# Patient Record
Sex: MALE | Race: WHITE | Employment: OTHER | URBAN - NONMETROPOLITAN AREA
[De-identification: names, ages, dates, MRNs, and addresses within clinical notes are randomized per-mention and may not be internally consistent; named-entity substitution may affect disease eponyms.]

---

## 2018-03-07 ENCOUNTER — APPOINTMENT (OUTPATIENT)
Dept: GENERAL RADIOLOGY | Age: 63
DRG: 720 | End: 2018-03-07
Payer: COMMERCIAL

## 2018-03-07 ENCOUNTER — HOSPITAL ENCOUNTER (INPATIENT)
Age: 63
LOS: 7 days | Discharge: HOME HEALTH | DRG: 720 | End: 2018-03-14
Attending: INTERNAL MEDICINE | Admitting: INTERNAL MEDICINE
Payer: COMMERCIAL

## 2018-03-07 ENCOUNTER — APPOINTMENT (OUTPATIENT)
Dept: CT IMAGING | Age: 63
DRG: 720 | End: 2018-03-07
Payer: COMMERCIAL

## 2018-03-07 DIAGNOSIS — J18.9 COMMUNITY ACQUIRED PNEUMONIA, UNSPECIFIED LATERALITY: ICD-10-CM

## 2018-03-07 DIAGNOSIS — A41.9 SEPSIS, DUE TO UNSPECIFIED ORGANISM: Primary | ICD-10-CM

## 2018-03-07 DIAGNOSIS — I10 ESSENTIAL HYPERTENSION: ICD-10-CM

## 2018-03-07 DIAGNOSIS — G47.30 SLEEP APNEA, UNSPECIFIED TYPE: ICD-10-CM

## 2018-03-07 DIAGNOSIS — E10.10 DIABETIC KETOACIDOSIS WITHOUT COMA ASSOCIATED WITH TYPE 1 DIABETES MELLITUS (HCC): ICD-10-CM

## 2018-03-07 PROBLEM — E11.10 DIABETIC KETOACIDOSIS WITHOUT COMA ASSOCIATED WITH TYPE 2 DIABETES MELLITUS (HCC): Status: ACTIVE | Noted: 2018-03-07

## 2018-03-07 LAB
ALBUMIN SERPL-MCNC: 3.7 G/DL (ref 3.5–5.1)
ALLEN TEST: POSITIVE
ALP BLD-CCNC: 94 U/L (ref 38–126)
ALT SERPL-CCNC: 10 U/L (ref 11–66)
AMYLASE: 28 U/L (ref 20–104)
ANION GAP SERPL CALCULATED.3IONS-SCNC: 32 MEQ/L (ref 8–16)
ANISOCYTOSIS: ABNORMAL
AST SERPL-CCNC: 7 U/L (ref 5–40)
BASE EXCESS (CALCULATED): -20.2 MMOL/L (ref -2.5–2.5)
BASE EXCESS (CALCULATED): -22.4 MMOL/L (ref -2.5–2.5)
BASOPHILS # BLD: 0.2 %
BASOPHILS ABSOLUTE: 0 THOU/MM3 (ref 0–0.1)
BETA-HYDROXYBUTYRATE: 76.81 MG/DL (ref 0.2–2.81)
BILIRUB SERPL-MCNC: 0.3 MG/DL (ref 0.3–1.2)
BILIRUBIN DIRECT: < 0.2 MG/DL (ref 0–0.3)
BUN BLDV-MCNC: 34 MG/DL (ref 7–22)
CALCIUM SERPL-MCNC: 9 MG/DL (ref 8.5–10.5)
CHLORIDE BLD-SCNC: 90 MEQ/L (ref 98–111)
CO2: 6 MEQ/L (ref 23–33)
COLLECTED BY:: ABNORMAL
CREAT SERPL-MCNC: 1.4 MG/DL (ref 0.4–1.2)
DEVICE: ABNORMAL
EKG ATRIAL RATE: 122 BPM
EKG P AXIS: -6 DEGREES
EKG P-R INTERVAL: 156 MS
EKG Q-T INTERVAL: 320 MS
EKG QRS DURATION: 84 MS
EKG QTC CALCULATION (BAZETT): 456 MS
EKG R AXIS: -70 DEGREES
EKG T AXIS: 80 DEGREES
EKG VENTRICULAR RATE: 122 BPM
EOSINOPHIL # BLD: 0 %
EOSINOPHILS ABSOLUTE: 0 THOU/MM3 (ref 0–0.4)
FLU A ANTIGEN: NEGATIVE
FLU B ANTIGEN: NEGATIVE
GFR SERPL CREATININE-BSD FRML MDRD: 51 ML/MIN/1.73M2
GLUCOSE BLD-MCNC: 185 MG/DL (ref 70–108)
GLUCOSE BLD-MCNC: 268 MG/DL (ref 70–108)
GLUCOSE BLD-MCNC: 338 MG/DL (ref 70–108)
GLUCOSE BLD-MCNC: 416 MG/DL (ref 70–108)
GLUCOSE BLD-MCNC: 460 MG/DL (ref 70–108)
GLUCOSE BLD-MCNC: 557 MG/DL (ref 70–108)
GLUCOSE BLD-MCNC: 595 MG/DL (ref 70–108)
HCO3: 5 MMOL/L (ref 23–28)
HCO3: 7 MMOL/L (ref 23–28)
HCT VFR BLD CALC: 51 % (ref 42–52)
HEMOGLOBIN: 16.5 GM/DL (ref 14–18)
IFIO2: 4
IFIO2: 50
LACTIC ACID, SEPSIS: 2 MMOL/L (ref 0.5–1.9)
LACTIC ACID, SEPSIS: 2.4 MMOL/L (ref 0.5–1.9)
LIPASE: 46.8 U/L (ref 5.6–51.3)
LYMPHOCYTES # BLD: 6.1 %
LYMPHOCYTES ABSOLUTE: 1.1 THOU/MM3 (ref 1–4.8)
MAGNESIUM: 2.5 MG/DL (ref 1.6–2.4)
MCH RBC QN AUTO: 28.8 PG (ref 27–31)
MCHC RBC AUTO-ENTMCNC: 32.5 GM/DL (ref 33–37)
MCV RBC AUTO: 88.5 FL (ref 80–94)
MODE: ABNORMAL
MONOCYTES # BLD: 5.4 %
MONOCYTES ABSOLUTE: 1 THOU/MM3 (ref 0.4–1.3)
NUCLEATED RED BLOOD CELLS: 0 /100 WBC
O2 SATURATION: 96 %
O2 SATURATION: 99 %
OSMOLALITY CALCULATION: 292.3 MOSMOL/KG (ref 275–300)
PCO2: 11 MMHG (ref 35–45)
PCO2: 14 MMHG (ref 35–45)
PCO2: 26 MMHG (ref 35–45)
PDW BLD-RTO: 15.1 % (ref 11.5–14.5)
PH BLOOD GAS: 7.04 (ref 7.35–7.45)
PH BLOOD GAS: 7.18 (ref 7.35–7.45)
PH BLOOD GAS: 7.19 (ref 7.35–7.45)
PLATELET # BLD: 304 THOU/MM3 (ref 130–400)
PMV BLD AUTO: 11.2 FL (ref 7.4–10.4)
PO2: 121 MMHG (ref 71–104)
PO2: 134 MMHG (ref 71–104)
PO2: 142 MMHG (ref 71–104)
POTASSIUM SERPL-SCNC: 6.1 MEQ/L (ref 3.5–5.2)
PRO-BNP: 494.2 PG/ML (ref 0–900)
RBC # BLD: 5.76 MILL/MM3 (ref 4.7–6.1)
SEG NEUTROPHILS: 88.3 %
SEGMENTED NEUTROPHILS ABSOLUTE COUNT: 16.2 THOU/MM3 (ref 1.8–7.7)
SET PEEP: 5 MMHG
SET RESPIRATORY RATE: 22 BPM
SET TIDAL VOLUME: 440 ML
SODIUM BLD-SCNC: 128 MEQ/L (ref 135–145)
SOURCE, BLOOD GAS: ABNORMAL
TOTAL PROTEIN: 6.7 G/DL (ref 6.1–8)
TROPONIN T: < 0.01 NG/ML
WBC # BLD: 18.3 THOU/MM3 (ref 4.8–10.8)

## 2018-03-07 PROCEDURE — 31500 INSERT EMERGENCY AIRWAY: CPT

## 2018-03-07 PROCEDURE — 71045 X-RAY EXAM CHEST 1 VIEW: CPT

## 2018-03-07 PROCEDURE — 94002 VENT MGMT INPAT INIT DAY: CPT

## 2018-03-07 PROCEDURE — 83036 HEMOGLOBIN GLYCOSYLATED A1C: CPT

## 2018-03-07 PROCEDURE — 6370000000 HC RX 637 (ALT 250 FOR IP): Performed by: INTERNAL MEDICINE

## 2018-03-07 PROCEDURE — 83605 ASSAY OF LACTIC ACID: CPT

## 2018-03-07 PROCEDURE — 5A1935Z RESPIRATORY VENTILATION, LESS THAN 24 CONSECUTIVE HOURS: ICD-10-PCS | Performed by: FAMILY MEDICINE

## 2018-03-07 PROCEDURE — 96376 TX/PRO/DX INJ SAME DRUG ADON: CPT

## 2018-03-07 PROCEDURE — 96368 THER/DIAG CONCURRENT INF: CPT

## 2018-03-07 PROCEDURE — 96365 THER/PROPH/DIAG IV INF INIT: CPT

## 2018-03-07 PROCEDURE — 87804 INFLUENZA ASSAY W/OPTIC: CPT

## 2018-03-07 PROCEDURE — 93005 ELECTROCARDIOGRAM TRACING: CPT | Performed by: INTERNAL MEDICINE

## 2018-03-07 PROCEDURE — 74176 CT ABD & PELVIS W/O CONTRAST: CPT

## 2018-03-07 PROCEDURE — 85025 COMPLETE CBC W/AUTO DIFF WBC: CPT

## 2018-03-07 PROCEDURE — 6360000002 HC RX W HCPCS: Performed by: INTERNAL MEDICINE

## 2018-03-07 PROCEDURE — 80053 COMPREHEN METABOLIC PANEL: CPT

## 2018-03-07 PROCEDURE — 82550 ASSAY OF CK (CPK): CPT

## 2018-03-07 PROCEDURE — 2700000000 HC OXYGEN THERAPY PER DAY

## 2018-03-07 PROCEDURE — 96375 TX/PRO/DX INJ NEW DRUG ADDON: CPT

## 2018-03-07 PROCEDURE — 94640 AIRWAY INHALATION TREATMENT: CPT

## 2018-03-07 PROCEDURE — 06HM33Z INSERTION OF INFUSION DEVICE INTO RIGHT FEMORAL VEIN, PERCUTANEOUS APPROACH: ICD-10-PCS | Performed by: FAMILY MEDICINE

## 2018-03-07 PROCEDURE — 2580000003 HC RX 258: Performed by: INTERNAL MEDICINE

## 2018-03-07 PROCEDURE — 74150 CT ABDOMEN W/O CONTRAST: CPT

## 2018-03-07 PROCEDURE — 99291 CRITICAL CARE FIRST HOUR: CPT

## 2018-03-07 PROCEDURE — 82948 REAGENT STRIP/BLOOD GLUCOSE: CPT

## 2018-03-07 PROCEDURE — 82248 BILIRUBIN DIRECT: CPT

## 2018-03-07 PROCEDURE — 87040 BLOOD CULTURE FOR BACTERIA: CPT

## 2018-03-07 PROCEDURE — 2500000003 HC RX 250 WO HCPCS: Performed by: INTERNAL MEDICINE

## 2018-03-07 PROCEDURE — 83880 ASSAY OF NATRIURETIC PEPTIDE: CPT

## 2018-03-07 PROCEDURE — 84484 ASSAY OF TROPONIN QUANT: CPT

## 2018-03-07 PROCEDURE — 82010 KETONE BODYS QUAN: CPT

## 2018-03-07 PROCEDURE — 71250 CT THORAX DX C-: CPT

## 2018-03-07 PROCEDURE — 96366 THER/PROPH/DIAG IV INF ADDON: CPT

## 2018-03-07 PROCEDURE — 2500000003 HC RX 250 WO HCPCS

## 2018-03-07 PROCEDURE — 36415 COLL VENOUS BLD VENIPUNCTURE: CPT

## 2018-03-07 PROCEDURE — 82803 BLOOD GASES ANY COMBINATION: CPT

## 2018-03-07 PROCEDURE — 99291 CRITICAL CARE FIRST HOUR: CPT | Performed by: INTERNAL MEDICINE

## 2018-03-07 PROCEDURE — 83690 ASSAY OF LIPASE: CPT

## 2018-03-07 PROCEDURE — 82150 ASSAY OF AMYLASE: CPT

## 2018-03-07 PROCEDURE — C1751 CATH, INF, PER/CENT/MIDLINE: HCPCS

## 2018-03-07 PROCEDURE — 83735 ASSAY OF MAGNESIUM: CPT

## 2018-03-07 PROCEDURE — 2100000000 HC CCU R&B

## 2018-03-07 PROCEDURE — 93010 ELECTROCARDIOGRAM REPORT: CPT | Performed by: INTERNAL MEDICINE

## 2018-03-07 PROCEDURE — 96361 HYDRATE IV INFUSION ADD-ON: CPT

## 2018-03-07 PROCEDURE — 70450 CT HEAD/BRAIN W/O DYE: CPT

## 2018-03-07 PROCEDURE — 0BH17EZ INSERTION OF ENDOTRACHEAL AIRWAY INTO TRACHEA, VIA NATURAL OR ARTIFICIAL OPENING: ICD-10-PCS | Performed by: FAMILY MEDICINE

## 2018-03-07 PROCEDURE — 36600 WITHDRAWAL OF ARTERIAL BLOOD: CPT

## 2018-03-07 RX ORDER — LORAZEPAM 2 MG/ML
1 INJECTION INTRAMUSCULAR ONCE
Status: COMPLETED | OUTPATIENT
Start: 2018-03-07 | End: 2018-03-07

## 2018-03-07 RX ORDER — ETOMIDATE 2 MG/ML
INJECTION INTRAVENOUS DAILY PRN
Status: DISCONTINUED | OUTPATIENT
Start: 2018-03-07 | End: 2018-03-09

## 2018-03-07 RX ORDER — 0.9 % SODIUM CHLORIDE 0.9 %
500 INTRAVENOUS SOLUTION INTRAVENOUS ONCE
Status: DISCONTINUED | OUTPATIENT
Start: 2018-03-07 | End: 2018-03-07

## 2018-03-07 RX ORDER — FENTANYL CITRATE 50 UG/ML
INJECTION, SOLUTION INTRAMUSCULAR; INTRAVENOUS
Status: DISPENSED
Start: 2018-03-07 | End: 2018-03-08

## 2018-03-07 RX ORDER — ROCURONIUM BROMIDE 10 MG/ML
INJECTION, SOLUTION INTRAVENOUS
Status: DISCONTINUED
Start: 2018-03-07 | End: 2018-03-07

## 2018-03-07 RX ORDER — 0.9 % SODIUM CHLORIDE 0.9 %
1000 INTRAVENOUS SOLUTION INTRAVENOUS ONCE
Status: CANCELLED | OUTPATIENT
Start: 2018-03-07 | End: 2018-03-07

## 2018-03-07 RX ORDER — ROCURONIUM BROMIDE 10 MG/ML
INJECTION, SOLUTION INTRAVENOUS DAILY PRN
Status: DISCONTINUED | OUTPATIENT
Start: 2018-03-07 | End: 2018-03-14 | Stop reason: HOSPADM

## 2018-03-07 RX ORDER — 0.9 % SODIUM CHLORIDE 0.9 %
1000 INTRAVENOUS SOLUTION INTRAVENOUS ONCE
Status: COMPLETED | OUTPATIENT
Start: 2018-03-07 | End: 2018-03-07

## 2018-03-07 RX ORDER — FENTANYL CITRATE 50 UG/ML
50 INJECTION, SOLUTION INTRAMUSCULAR; INTRAVENOUS
Status: ACTIVE | OUTPATIENT
Start: 2018-03-07 | End: 2018-03-10

## 2018-03-07 RX ORDER — DEXTROSE AND SODIUM CHLORIDE 5; .45 G/100ML; G/100ML
INJECTION, SOLUTION INTRAVENOUS CONTINUOUS PRN
Status: DISCONTINUED | OUTPATIENT
Start: 2018-03-07 | End: 2018-03-08

## 2018-03-07 RX ORDER — SODIUM CHLORIDE 450 MG/100ML
INJECTION, SOLUTION INTRAVENOUS CONTINUOUS
Status: DISCONTINUED | OUTPATIENT
Start: 2018-03-08 | End: 2018-03-09

## 2018-03-07 RX ORDER — SODIUM CHLORIDE 450 MG/100ML
INJECTION, SOLUTION INTRAVENOUS CONTINUOUS
Status: DISCONTINUED | OUTPATIENT
Start: 2018-03-07 | End: 2018-03-08 | Stop reason: SDUPTHER

## 2018-03-07 RX ORDER — MIRTAZAPINE 30 MG/1
30 TABLET, FILM COATED ORAL NIGHTLY
Status: DISCONTINUED | OUTPATIENT
Start: 2018-03-08 | End: 2018-03-13 | Stop reason: CLARIF

## 2018-03-07 RX ORDER — PROPOFOL 10 MG/ML
20 INJECTION, EMULSION INTRAVENOUS
Status: DISCONTINUED | OUTPATIENT
Start: 2018-03-07 | End: 2018-03-09

## 2018-03-07 RX ORDER — 0.9 % SODIUM CHLORIDE 0.9 %
1000 INTRAVENOUS SOLUTION INTRAVENOUS ONCE
Status: DISCONTINUED | OUTPATIENT
Start: 2018-03-07 | End: 2018-03-07

## 2018-03-07 RX ORDER — 0.9 % SODIUM CHLORIDE 0.9 %
1000 INTRAVENOUS SOLUTION INTRAVENOUS ONCE
Status: DISCONTINUED | OUTPATIENT
Start: 2018-03-07 | End: 2018-03-08 | Stop reason: HOSPADM

## 2018-03-07 RX ORDER — FENTANYL CITRATE 50 UG/ML
50 INJECTION, SOLUTION INTRAMUSCULAR; INTRAVENOUS ONCE
Status: COMPLETED | OUTPATIENT
Start: 2018-03-07 | End: 2018-03-07

## 2018-03-07 RX ORDER — DULOXETIN HYDROCHLORIDE 30 MG/1
30 CAPSULE, DELAYED RELEASE ORAL DAILY
Status: DISCONTINUED | OUTPATIENT
Start: 2018-03-08 | End: 2018-03-13 | Stop reason: CLARIF

## 2018-03-07 RX ORDER — AZITHROMYCIN 250 MG/1
500 TABLET, FILM COATED ORAL DAILY
Status: DISCONTINUED | OUTPATIENT
Start: 2018-03-08 | End: 2018-03-10

## 2018-03-07 RX ORDER — GABAPENTIN 300 MG/1
300 CAPSULE ORAL 3 TIMES DAILY
Status: DISCONTINUED | OUTPATIENT
Start: 2018-03-08 | End: 2018-03-14 | Stop reason: HOSPADM

## 2018-03-07 RX ORDER — LANSOPRAZOLE 30 MG/1
30 CAPSULE, DELAYED RELEASE ORAL
Status: DISCONTINUED | OUTPATIENT
Start: 2018-03-08 | End: 2018-03-08 | Stop reason: CLARIF

## 2018-03-07 RX ORDER — POTASSIUM CHLORIDE 7.45 MG/ML
10 INJECTION INTRAVENOUS PRN
Status: DISCONTINUED | OUTPATIENT
Start: 2018-03-07 | End: 2018-03-14 | Stop reason: HOSPADM

## 2018-03-07 RX ORDER — PROPOFOL 10 MG/ML
10 INJECTION, EMULSION INTRAVENOUS
Status: DISCONTINUED | OUTPATIENT
Start: 2018-03-07 | End: 2018-03-09

## 2018-03-07 RX ORDER — IPRATROPIUM BROMIDE AND ALBUTEROL SULFATE 2.5; .5 MG/3ML; MG/3ML
1 SOLUTION RESPIRATORY (INHALATION) ONCE
Status: COMPLETED | OUTPATIENT
Start: 2018-03-07 | End: 2018-03-07

## 2018-03-07 RX ADMIN — ETOMIDATE 30 MG: 2 INJECTION INTRAVENOUS at 20:21

## 2018-03-07 RX ADMIN — CEFEPIME HYDROCHLORIDE 2 G: 2 INJECTION, POWDER, FOR SOLUTION INTRAVENOUS at 19:13

## 2018-03-07 RX ADMIN — Medication 200 MEQ: at 21:22

## 2018-03-07 RX ADMIN — INSULIN HUMAN 10 UNITS: 100 INJECTION, SOLUTION PARENTERAL at 17:42

## 2018-03-07 RX ADMIN — SODIUM CHLORIDE 8 UNITS/HR: 9 INJECTION, SOLUTION INTRAVENOUS at 18:24

## 2018-03-07 RX ADMIN — ROCURONIUM BROMIDE 150 MG: 10 INJECTION, SOLUTION INTRAVENOUS at 20:22

## 2018-03-07 RX ADMIN — SODIUM CHLORIDE 2000 ML: 4.5 INJECTION, SOLUTION INTRAVENOUS at 20:27

## 2018-03-07 RX ADMIN — PROPOFOL 20 MCG/KG/MIN: 10 INJECTION, EMULSION INTRAVENOUS at 23:49

## 2018-03-07 RX ADMIN — VANCOMYCIN HYDROCHLORIDE 2000 MG: 1 INJECTION, POWDER, LYOPHILIZED, FOR SOLUTION INTRAVENOUS at 19:13

## 2018-03-07 RX ADMIN — SODIUM CHLORIDE 1000 ML: 9 INJECTION, SOLUTION INTRAVENOUS at 16:51

## 2018-03-07 RX ADMIN — INSULIN HUMAN 15 UNITS: 100 INJECTION, SOLUTION PARENTERAL at 21:20

## 2018-03-07 RX ADMIN — IPRATROPIUM BROMIDE AND ALBUTEROL SULFATE 1 AMPULE: .5; 3 SOLUTION RESPIRATORY (INHALATION) at 18:40

## 2018-03-07 RX ADMIN — SODIUM CHLORIDE: 4.5 INJECTION, SOLUTION INTRAVENOUS at 23:48

## 2018-03-07 RX ADMIN — LORAZEPAM 1 MG: 2 INJECTION, SOLUTION INTRAMUSCULAR; INTRAVENOUS at 18:52

## 2018-03-07 RX ADMIN — SODIUM CHLORIDE 1000 ML: 9 INJECTION, SOLUTION INTRAVENOUS at 18:38

## 2018-03-07 RX ADMIN — Medication 15 UNITS/HR: at 23:48

## 2018-03-07 RX ADMIN — FENTANYL CITRATE 50 MCG: 50 INJECTION INTRAMUSCULAR; INTRAVENOUS at 20:51

## 2018-03-07 ASSESSMENT — ENCOUNTER SYMPTOMS
NAUSEA: 1
COUGH: 1
BACK PAIN: 0
ABDOMINAL PAIN: 0
RHINORRHEA: 0
WHEEZING: 0
SHORTNESS OF BREATH: 1
SORE THROAT: 0
EYE REDNESS: 0
VOMITING: 1
DIARRHEA: 0
EYE DISCHARGE: 0

## 2018-03-07 ASSESSMENT — PULMONARY FUNCTION TESTS
PIF_VALUE: 25
PIF_VALUE: 18

## 2018-03-07 NOTE — ED NOTES
Pt to er. Pt brought by EMS for SOB and hyperglycemia. Pt recently moved in to a trailer with no running water. States has not taken his insulin for the past couple of days. Pt also states he thought he had food poisoning a few days ago after eating raw meatballs. Pt resp labored and rapid. Assessment completed. Dr. Juan M Iqbal in room to марина pt. Family at side.       Yenifer Rojas RN  03/07/18 3668

## 2018-03-07 NOTE — ED PROVIDER NOTES
edema   Skin: Negative for pallor and rash. Allergic/Immunologic: Negative for environmental allergies. Neurological: Negative for dizziness, syncope, weakness, light-headedness and headaches. Hematological: Negative for adenopathy. Psychiatric/Behavioral: Negative for agitation, confusion, dysphoric mood and suicidal ideas. The patient is not nervous/anxious. PAST MEDICAL HISTORY    has a past medical history of Depression; Gout; Hyperlipidemia; Hypertension; Neuropathy ([address] and Type II or unspecified type diabetes mellitus without mention of complication, not stated as uncontrolled. SURGICAL HISTORY      has a past surgical history that includes Tonsillectomy. CURRENT MEDICATIONS       Previous Medications    ACETAMINOPHEN 650 MG TABS    Take 650 mg by mouth every 4 hours as needed for Pain (For mild pain level 1-3 or for fever > 100.5). ACETAMINOPHEN-CODEINE (TYLENOL/CODEINE #3) 300-30 MG PER TABLET    Take 1 tablet by mouth every 4 hours as needed for Pain    ALCOHOL SWABS PADS        AMLODIPINE (NORVASC) 10 MG TABLET    Take 1 tablet by mouth daily. ASPIRIN EC 81 MG EC TABLET    Take 1 tablet by mouth daily. ASPIRIN-CAFFEINE (MERE BACK & BODY PAIN EX ST) 500-32.5 MG TABS    Take  by mouth. Patient takes a total of 3 tablets daily    BLOOD GLUCOSE MONITORING SUPPL (FREESTYLE FREEDOM LITE) W/DEVICE KIT    by Does not apply route. Please dispense strips    DULOXETINE (CYMBALTA) 30 MG CAPSULE    take 1 capsule by mouth once daily    FREESTYLE LITE STRIP    TEST as directed four times a day    GABAPENTIN (NEURONTIN) 300 MG CAPSULE    take 1 capsule by mouth three times a day    INDOMETHACIN (INDOCIN) 50 MG CAPSULE    take 1 capsule by mouth three times a day for 5 days    INSULIN GLARGINE (LANTUS) 100 UNIT/ML INJECTION VIAL    Inject 53 Units into the skin nightly. INSULIN PEN NEEDLE (UNIFINE PENTIPS) 31G X 8 MM MISC    by Does not apply route.     INSULIN SYRINGE-NEEDLE U-100 (B-D INS SYR ULTRAFINE 1CC/31G) 31G X 516\" 1 ML MISC    Inject 1 each into the skin daily. LANCETS MISC    Apply 1 each topically 2 times daily. LANSOPRAZOLE (PREVACID) 30 MG CAPSULE    take 1 capsule by mouth once daily    LISINOPRIL-HYDROCHLOROTHIAZIDE (PRINZIDE;ZESTORETIC) 20-12.5 MG PER TABLET    Take 1 tablet by mouth daily. METFORMIN (GLUCOPHAGE) 1000 MG TABLET    Take 1 tablet by mouth 2 times daily (with meals). MIRTAZAPINE (REMERON SOL-TAB) 30 MG DISINTEGRATING TABLET    place 1 tablet ON TONGUE at bedtime    ROSUVASTATIN (CRESTOR) 20 MG TABLET    Take 1 tablet by mouth daily. SITAGLIPTAN (JANUVIA) 100 MG TABLET    Take 1 tablet by mouth daily. SYRINGE, DISPOSABLE, 1 ML MISC    Inject  into the skin. ALLERGIES     is allergic to aripiprazole and citalopram hydrobromide. FAMILY HISTORY     indicated that his mother is . He indicated that his father is . He indicated that his paternal grandmother is . He indicated that his maternal uncle is . He indicated that the status of his paternal uncle is unknown.    family history includes Cancer in his maternal uncle and paternal grandmother; Diabetes in his mother and paternal grandmother; Heart Attack in his mother; Fatimah [place] in his father; Parkinsonism in his paternal uncle. SOCIAL HISTORY      reports that he quit smoking about 26 years ago. He has never used smokeless tobacco. He reports that he drinks alcohol. He reports that he does not use drugs. PHYSICAL EXAM     INITIAL VITALS:  height is 5' 9\" (1.753 m) and weight is 360 lb (163.3 kg) (abnormal). His axillary temperature is 98.2 °F (36.8 °C). His blood pressure is 156/78 (abnormal) and his pulse is 120. His respiration is 22 and oxygen saturation is 100%. Physical Exam   Constitutional: He is oriented to person, place, and time. He appears well-developed and well-nourished. HENT:   Head: Normocephalic and atraumatic.    Right Ear: External ear normal.   Left Ear: External ear normal.   Mouth/Throat: Mucous membranes are dry. Eyes: Conjunctivae are normal. Right eye exhibits no discharge. Left eye exhibits no discharge. No scleral icterus. Neck: Normal range of motion. Neck supple. No JVD present. Cardiovascular: Normal rate, regular rhythm and normal heart sounds. Exam reveals no gallop and no friction rub. No murmur heard. Pulmonary/Chest: Effort normal and breath sounds normal. No respiratory distress. He has no decreased breath sounds. He has no wheezes. He has no rhonchi. He has no rales. Abdominal: Soft. He exhibits no distension. There is no tenderness. There is no rebound and no guarding. Musculoskeletal: Normal range of motion. He exhibits no edema. Right lower leg: He exhibits tenderness. He exhibits no bony tenderness, no swelling and no edema. Left lower leg: Normal. He exhibits no tenderness, no bony tenderness, no swelling and no edema. Neurological: He is alert and oriented to person, place, and time. He exhibits normal muscle tone. He displays no seizure activity. GCS eye subscore is 4. GCS verbal subscore is 5. GCS motor subscore is 6. Skin: Skin is warm and dry. No rash noted. He is not diaphoretic. Psychiatric: He has a normal mood and affect. His behavior is normal. Thought content normal.       DIFFERENTIAL DIAGNOSIS:       DIAGNOSTIC RESULTS     EKG: All EKG's are interpreted by the Emergency Department Physician who either signs or Co-signs this chart in the absence of a cardiologist.  EKG interpreted by Alvaro So MD:    Vent. Rate: 122 bpm  TX interval: 156 ms  QRS duration: 84 ms  QTc: 456 ms  P-R-T axes: -6, -70, 80  No STEMI, no EKG comparison. EKG gives impression of sinus tachycardia and left axis deviation. RADIOLOGY: non-plain film images(s) such as CT, Ultrasound and MRI are read by the radiologist.    XR CHEST PORTABLE   Final Result   No definite lobar consolidation. presence, and it accurately records my words and actions.     John Jean-Baptiste MD 3/7/18 9:30 PM        John Jean-Baptiste MD  03/07/18 3634

## 2018-03-08 LAB
ALLEN TEST: POSITIVE
ALLEN TEST: POSITIVE
ANION GAP SERPL CALCULATED.3IONS-SCNC: 14 MEQ/L (ref 8–16)
ANION GAP SERPL CALCULATED.3IONS-SCNC: 15 MEQ/L (ref 8–16)
AVERAGE GLUCOSE: 192 MG/DL (ref 70–126)
BASE EXCESS (CALCULATED): -12.3 MMOL/L (ref -2.5–2.5)
BASE EXCESS (CALCULATED): -5.5 MMOL/L (ref -2.5–2.5)
BETA-HYDROXYBUTYRATE: 3.59 MG/DL (ref 0.2–2.81)
BUN BLDV-MCNC: 25 MG/DL (ref 7–22)
BUN BLDV-MCNC: 28 MG/DL (ref 7–22)
CALCIUM IONIZED: 1.08 MMOL/L (ref 1.12–1.32)
CALCIUM SERPL-MCNC: 7.7 MG/DL (ref 8.5–10.5)
CALCIUM SERPL-MCNC: 7.8 MG/DL (ref 8.5–10.5)
CHLORIDE BLD-SCNC: 100 MEQ/L (ref 98–111)
CHLORIDE BLD-SCNC: 98 MEQ/L (ref 98–111)
CO2: 20 MEQ/L (ref 23–33)
CO2: 21 MEQ/L (ref 23–33)
COLLECTED BY:: ABNORMAL
COLLECTED BY:: ABNORMAL
CREAT SERPL-MCNC: 0.9 MG/DL (ref 0.4–1.2)
CREAT SERPL-MCNC: 0.9 MG/DL (ref 0.4–1.2)
DEVICE: ABNORMAL
DEVICE: ABNORMAL
GFR SERPL CREATININE-BSD FRML MDRD: 86 ML/MIN/1.73M2
GFR SERPL CREATININE-BSD FRML MDRD: 86 ML/MIN/1.73M2
GLUCOSE BLD-MCNC: 119 MG/DL (ref 70–108)
GLUCOSE BLD-MCNC: 120 MG/DL (ref 70–108)
GLUCOSE BLD-MCNC: 128 MG/DL (ref 70–108)
GLUCOSE BLD-MCNC: 130 MG/DL (ref 70–108)
GLUCOSE BLD-MCNC: 134 MG/DL (ref 70–108)
GLUCOSE BLD-MCNC: 135 MG/DL (ref 70–108)
GLUCOSE BLD-MCNC: 140 MG/DL (ref 70–108)
GLUCOSE BLD-MCNC: 156 MG/DL (ref 70–108)
GLUCOSE BLD-MCNC: 163 MG/DL (ref 70–108)
GLUCOSE BLD-MCNC: 193 MG/DL (ref 70–108)
GLUCOSE BLD-MCNC: 195 MG/DL (ref 70–108)
GLUCOSE BLD-MCNC: 197 MG/DL (ref 70–108)
GLUCOSE BLD-MCNC: 202 MG/DL (ref 70–108)
GLUCOSE BLD-MCNC: 206 MG/DL (ref 70–108)
GLUCOSE BLD-MCNC: 214 MG/DL (ref 70–108)
GLUCOSE BLD-MCNC: 218 MG/DL (ref 70–108)
GLUCOSE BLD-MCNC: 229 MG/DL (ref 70–108)
GLUCOSE BLD-MCNC: 273 MG/DL (ref 70–108)
HBA1C MFR BLD: 8.4 % (ref 4.4–6.4)
HCO3: 12 MMOL/L (ref 23–28)
HCO3: 17 MMOL/L (ref 23–28)
IFIO2: 25
IFIO2: 40
LACTIC ACID: 1.6 MMOL/L (ref 0.5–2.2)
MAGNESIUM: 1.7 MG/DL (ref 1.6–2.4)
MAGNESIUM: 1.8 MG/DL (ref 1.6–2.4)
MODE: AC
MODE: AC
MONOCYTES, BAL: 6 %
MRSA SCREEN RT-PCR: NEGATIVE
O2 SATURATION: 98 %
O2 SATURATION: 99 %
PCO2: 24 MMHG (ref 35–45)
PCO2: 26 MMHG (ref 35–45)
PH BLOOD GAS: 7.3 (ref 7.35–7.45)
PH BLOOD GAS: 7.44 (ref 7.35–7.45)
PHOSPHORUS: 0.5 MG/DL (ref 2.4–4.7)
PHOSPHORUS: 1.1 MG/DL (ref 2.4–4.7)
PHOSPHORUS: 1.2 MG/DL (ref 2.4–4.7)
PHOSPHORUS: 1.5 MG/DL (ref 2.4–4.7)
PHOSPHORUS: 2.7 MG/DL (ref 2.4–4.7)
PO2: 141 MMHG (ref 71–104)
PO2: 94 MMHG (ref 71–104)
POTASSIUM SERPL-SCNC: 2.4 MEQ/L (ref 3.5–5.2)
POTASSIUM SERPL-SCNC: 3.1 MEQ/L (ref 3.5–5.2)
POTASSIUM SERPL-SCNC: 3.2 MEQ/L (ref 3.5–5.2)
POTASSIUM SERPL-SCNC: 3.6 MEQ/L (ref 3.5–5.2)
RBC FLUID: 2330 /CUMM (ref 0–100)
SEGMENTED NEUTROPHILS, BAL: 94 %
SET PEEP: 5 MMHG
SET PEEP: 5 MMHG
SET RESPIRATORY RATE: 22 BPM
SET RESPIRATORY RATE: 22 BPM
SET TIDAL VOLUME: 550 ML
SET TIDAL VOLUME: 550 ML
SODIUM BLD-SCNC: 133 MEQ/L (ref 135–145)
SODIUM BLD-SCNC: 135 MEQ/L (ref 135–145)
SOURCE, BLOOD GAS: ABNORMAL
SOURCE, BLOOD GAS: ABNORMAL
TOTAL CK: 62 U/L (ref 55–170)
WBC FLUID: 1097 /MM3 (ref 0–500)

## 2018-03-08 PROCEDURE — 84100 ASSAY OF PHOSPHORUS: CPT

## 2018-03-08 PROCEDURE — 6360000002 HC RX W HCPCS: Performed by: INTERNAL MEDICINE

## 2018-03-08 PROCEDURE — 88305 TISSUE EXAM BY PATHOLOGIST: CPT

## 2018-03-08 PROCEDURE — 2580000003 HC RX 258: Performed by: INTERNAL MEDICINE

## 2018-03-08 PROCEDURE — 0B9G8ZX DRAINAGE OF LEFT UPPER LUNG LOBE, VIA NATURAL OR ARTIFICIAL OPENING ENDOSCOPIC, DIAGNOSTIC: ICD-10-PCS | Performed by: INTERNAL MEDICINE

## 2018-03-08 PROCEDURE — 36600 WITHDRAWAL OF ARTERIAL BLOOD: CPT

## 2018-03-08 PROCEDURE — 82803 BLOOD GASES ANY COMBINATION: CPT

## 2018-03-08 PROCEDURE — 6370000000 HC RX 637 (ALT 250 FOR IP): Performed by: INTERNAL MEDICINE

## 2018-03-08 PROCEDURE — 87075 CULTR BACTERIA EXCEPT BLOOD: CPT

## 2018-03-08 PROCEDURE — 2700000000 HC OXYGEN THERAPY PER DAY

## 2018-03-08 PROCEDURE — 3609010800 HC BRONCHOSCOPY ALVEOLAR LAVAGE: Performed by: INTERNAL MEDICINE

## 2018-03-08 PROCEDURE — 87070 CULTURE OTHR SPECIMN AEROBIC: CPT

## 2018-03-08 PROCEDURE — 94003 VENT MGMT INPAT SUBQ DAY: CPT

## 2018-03-08 PROCEDURE — 2580000003 HC RX 258: Performed by: NURSE PRACTITIONER

## 2018-03-08 PROCEDURE — 31624 DX BRONCHOSCOPE/LAVAGE: CPT | Performed by: INTERNAL MEDICINE

## 2018-03-08 PROCEDURE — 36415 COLL VENOUS BLD VENIPUNCTURE: CPT

## 2018-03-08 PROCEDURE — 89051 BODY FLUID CELL COUNT: CPT

## 2018-03-08 PROCEDURE — 94640 AIRWAY INHALATION TREATMENT: CPT

## 2018-03-08 PROCEDURE — 6370000000 HC RX 637 (ALT 250 FOR IP): Performed by: NURSE PRACTITIONER

## 2018-03-08 PROCEDURE — 88112 CYTOPATH CELL ENHANCE TECH: CPT

## 2018-03-08 PROCEDURE — 2500000003 HC RX 250 WO HCPCS: Performed by: INTERNAL MEDICINE

## 2018-03-08 PROCEDURE — 84132 ASSAY OF SERUM POTASSIUM: CPT

## 2018-03-08 PROCEDURE — 99232 SBSQ HOSP IP/OBS MODERATE 35: CPT | Performed by: HOSPITALIST

## 2018-03-08 PROCEDURE — APPSS60 APP SPLIT SHARED TIME 46-60 MINUTES: Performed by: NURSE PRACTITIONER

## 2018-03-08 PROCEDURE — 87081 CULTURE SCREEN ONLY: CPT

## 2018-03-08 PROCEDURE — 6370000000 HC RX 637 (ALT 250 FOR IP): Performed by: HOSPITALIST

## 2018-03-08 PROCEDURE — 82330 ASSAY OF CALCIUM: CPT

## 2018-03-08 PROCEDURE — 89050 BODY FLUID CELL COUNT: CPT

## 2018-03-08 PROCEDURE — 80048 BASIC METABOLIC PNL TOTAL CA: CPT

## 2018-03-08 PROCEDURE — 6360000002 HC RX W HCPCS: Performed by: HOSPITALIST

## 2018-03-08 PROCEDURE — 83605 ASSAY OF LACTIC ACID: CPT

## 2018-03-08 PROCEDURE — 6360000002 HC RX W HCPCS: Performed by: NURSE PRACTITIONER

## 2018-03-08 PROCEDURE — 87641 MR-STAPH DNA AMP PROBE: CPT

## 2018-03-08 PROCEDURE — 82948 REAGENT STRIP/BLOOD GLUCOSE: CPT

## 2018-03-08 PROCEDURE — 2580000003 HC RX 258: Performed by: HOSPITALIST

## 2018-03-08 PROCEDURE — 82010 KETONE BODYS QUAN: CPT

## 2018-03-08 PROCEDURE — 83735 ASSAY OF MAGNESIUM: CPT

## 2018-03-08 PROCEDURE — 99223 1ST HOSP IP/OBS HIGH 75: CPT | Performed by: INTERNAL MEDICINE

## 2018-03-08 PROCEDURE — 87205 SMEAR GRAM STAIN: CPT

## 2018-03-08 PROCEDURE — 2500000003 HC RX 250 WO HCPCS

## 2018-03-08 PROCEDURE — 2100000000 HC CCU R&B

## 2018-03-08 RX ORDER — POTASSIUM CHLORIDE 29.8 MG/ML
20 INJECTION INTRAVENOUS
Status: COMPLETED | OUTPATIENT
Start: 2018-03-08 | End: 2018-03-08

## 2018-03-08 RX ORDER — DEXTROSE MONOHYDRATE 50 MG/ML
100 INJECTION, SOLUTION INTRAVENOUS PRN
Status: DISCONTINUED | OUTPATIENT
Start: 2018-03-08 | End: 2018-03-14 | Stop reason: HOSPADM

## 2018-03-08 RX ORDER — DEXTROSE MONOHYDRATE 25 G/50ML
12.5 INJECTION, SOLUTION INTRAVENOUS PRN
Status: DISCONTINUED | OUTPATIENT
Start: 2018-03-08 | End: 2018-03-14 | Stop reason: HOSPADM

## 2018-03-08 RX ORDER — NICOTINE POLACRILEX 4 MG
15 LOZENGE BUCCAL PRN
Status: DISCONTINUED | OUTPATIENT
Start: 2018-03-08 | End: 2018-03-14 | Stop reason: HOSPADM

## 2018-03-08 RX ORDER — POTASSIUM CHLORIDE 20 MEQ/1
40 TABLET, EXTENDED RELEASE ORAL ONCE
Status: DISCONTINUED | OUTPATIENT
Start: 2018-03-08 | End: 2018-03-08 | Stop reason: CLARIF

## 2018-03-08 RX ORDER — INSULIN GLARGINE 100 [IU]/ML
30 INJECTION, SOLUTION SUBCUTANEOUS NIGHTLY
Status: DISCONTINUED | OUTPATIENT
Start: 2018-03-08 | End: 2018-03-09

## 2018-03-08 RX ORDER — IPRATROPIUM BROMIDE AND ALBUTEROL SULFATE 2.5; .5 MG/3ML; MG/3ML
1 SOLUTION RESPIRATORY (INHALATION)
Status: DISCONTINUED | OUTPATIENT
Start: 2018-03-08 | End: 2018-03-14 | Stop reason: HOSPADM

## 2018-03-08 RX ORDER — SODIUM CHLORIDE AND POTASSIUM CHLORIDE .9; .15 G/100ML; G/100ML
SOLUTION INTRAVENOUS CONTINUOUS
Status: DISCONTINUED | OUTPATIENT
Start: 2018-03-09 | End: 2018-03-10

## 2018-03-08 RX ORDER — PANTOPRAZOLE SODIUM 40 MG/1
40 TABLET, DELAYED RELEASE ORAL
Status: DISCONTINUED | OUTPATIENT
Start: 2018-03-08 | End: 2018-03-13 | Stop reason: ALTCHOICE

## 2018-03-08 RX ADMIN — POTASSIUM CHLORIDE 20 MEQ: 29.8 INJECTION, SOLUTION INTRAVENOUS at 19:52

## 2018-03-08 RX ADMIN — PROPOFOL 40 MCG/KG/MIN: 10 INJECTION, EMULSION INTRAVENOUS at 08:26

## 2018-03-08 RX ADMIN — ENOXAPARIN SODIUM 40 MG: 40 INJECTION SUBCUTANEOUS at 22:13

## 2018-03-08 RX ADMIN — THIAMINE HYDROCHLORIDE 100 MG: 100 INJECTION, SOLUTION INTRAMUSCULAR; INTRAVENOUS at 02:06

## 2018-03-08 RX ADMIN — IPRATROPIUM BROMIDE AND ALBUTEROL SULFATE 1 AMPULE: .5; 3 SOLUTION RESPIRATORY (INHALATION) at 11:59

## 2018-03-08 RX ADMIN — DEXTROSE AND SODIUM CHLORIDE: 5; 450 INJECTION, SOLUTION INTRAVENOUS at 00:04

## 2018-03-08 RX ADMIN — MICONAZOLE NITRATE: 2 POWDER TOPICAL at 02:06

## 2018-03-08 RX ADMIN — SODIUM PHOSPHATE, MONOBASIC, MONOHYDRATE 20 MMOL: 276; 142 INJECTION, SOLUTION INTRAVENOUS at 08:10

## 2018-03-08 RX ADMIN — SODIUM PHOSPHATE, MONOBASIC, MONOHYDRATE 20 MMOL: 276; 142 INJECTION, SOLUTION INTRAVENOUS at 21:12

## 2018-03-08 RX ADMIN — IPRATROPIUM BROMIDE AND ALBUTEROL SULFATE 1 AMPULE: .5; 3 SOLUTION RESPIRATORY (INHALATION) at 22:26

## 2018-03-08 RX ADMIN — VANCOMYCIN HYDROCHLORIDE 1500 MG: 1 INJECTION, POWDER, LYOPHILIZED, FOR SOLUTION INTRAVENOUS at 04:23

## 2018-03-08 RX ADMIN — Medication 1 UNITS: at 22:14

## 2018-03-08 RX ADMIN — MIRTAZAPINE 30 MG: 30 TABLET, FILM COATED ORAL at 22:14

## 2018-03-08 RX ADMIN — POTASSIUM CHLORIDE 20 MEQ: 29.8 INJECTION, SOLUTION INTRAVENOUS at 18:41

## 2018-03-08 RX ADMIN — POTASSIUM CHLORIDE: 2 INJECTION, SOLUTION, CONCENTRATE INTRAVENOUS at 14:31

## 2018-03-08 RX ADMIN — IPRATROPIUM BROMIDE AND ALBUTEROL SULFATE 1 AMPULE: .5; 3 SOLUTION RESPIRATORY (INHALATION) at 15:15

## 2018-03-08 RX ADMIN — SODIUM BICARBONATE: 84 INJECTION, SOLUTION INTRAVENOUS at 00:05

## 2018-03-08 RX ADMIN — INSULIN GLARGINE 30 UNITS: 100 INJECTION, SOLUTION SUBCUTANEOUS at 14:52

## 2018-03-08 RX ADMIN — PROPOFOL 40 MCG/KG/MIN: 10 INJECTION, EMULSION INTRAVENOUS at 05:48

## 2018-03-08 RX ADMIN — GABAPENTIN 300 MG: 300 CAPSULE ORAL at 22:14

## 2018-03-08 RX ADMIN — PROPOFOL 40 MCG/KG/MIN: 10 INJECTION, EMULSION INTRAVENOUS at 11:26

## 2018-03-08 RX ADMIN — MICONAZOLE NITRATE: 2 POWDER TOPICAL at 10:11

## 2018-03-08 RX ADMIN — PROPOFOL 25 MCG/KG/MIN: 10 INJECTION, EMULSION INTRAVENOUS at 00:05

## 2018-03-08 RX ADMIN — CEFEPIME HYDROCHLORIDE 2 G: 2 INJECTION, POWDER, FOR SOLUTION INTRAVENOUS at 11:27

## 2018-03-08 RX ADMIN — DEXTROSE AND SODIUM CHLORIDE: 5; 450 INJECTION, SOLUTION INTRAVENOUS at 10:14

## 2018-03-08 RX ADMIN — SODIUM BICARBONATE: 84 INJECTION, SOLUTION INTRAVENOUS at 12:46

## 2018-03-08 RX ADMIN — POTASSIUM CHLORIDE 20 MEQ: 29.8 INJECTION, SOLUTION INTRAVENOUS at 09:14

## 2018-03-08 RX ADMIN — AZITHROMYCIN 500 MG: 250 TABLET, FILM COATED ORAL at 08:19

## 2018-03-08 RX ADMIN — POTASSIUM CHLORIDE 20 MEQ: 29.8 INJECTION, SOLUTION INTRAVENOUS at 10:10

## 2018-03-08 RX ADMIN — PROPOFOL 40 MCG/KG/MIN: 10 INJECTION, EMULSION INTRAVENOUS at 03:14

## 2018-03-08 RX ADMIN — CEFTRIAXONE 1 G: 1 INJECTION, POWDER, FOR SOLUTION INTRAMUSCULAR; INTRAVENOUS at 12:59

## 2018-03-08 RX ADMIN — GABAPENTIN 300 MG: 300 CAPSULE ORAL at 08:19

## 2018-03-08 RX ADMIN — ENOXAPARIN SODIUM 40 MG: 40 INJECTION SUBCUTANEOUS at 08:30

## 2018-03-08 RX ADMIN — POTASSIUM CHLORIDE 20 MEQ: 29.8 INJECTION, SOLUTION INTRAVENOUS at 11:05

## 2018-03-08 RX ADMIN — CEFEPIME HYDROCHLORIDE 2 G: 2 INJECTION, POWDER, FOR SOLUTION INTRAVENOUS at 03:51

## 2018-03-08 RX ADMIN — Medication 14.9 UNITS/HR: at 05:23

## 2018-03-08 ASSESSMENT — PULMONARY FUNCTION TESTS
PIF_VALUE: 23
PIF_VALUE: 25
PIF_VALUE: 19
PIF_VALUE: 15
PIF_VALUE: 22

## 2018-03-08 ASSESSMENT — PAIN SCALES - WONG BAKER
WONGBAKER_NUMERICALRESPONSE: 0

## 2018-03-08 ASSESSMENT — PAIN SCALES - GENERAL: PAINLEVEL_OUTOF10: 0

## 2018-03-08 NOTE — PROGRESS NOTES
Bedside bronchoscopy performed by Dr Vanessa Hall.   Pt on ventilator,  fio2 increased to 100% before procedure  --will titrate back down to 25% as pt tolerates after bronch completed

## 2018-03-08 NOTE — PLAN OF CARE
Problem: Nutrition  Goal: Optimal nutrition therapy  Outcome: Ongoing  Nutrition Problem: Inadequate oral intake  Intervention: Food and/or Nutrient Delivery: Vitamin Supplement (If unable to extubate start enteral nutriton support.   If able to extubate start oral diet and RD will monitor for ONS needs)  Nutritional Goals: Patient will recieve adequate nutrition in 1-4 days Patient

## 2018-03-08 NOTE — CARE COORDINATION
3/8/18, 2:32 PM  Damion Lopez       Admitted from: ED 3/7/2018/ Ernestine Marquse day: 1   Location: -05/005-A Reason for admit: Sepsis [facility]) [A41.9] Status: inpt  Admit order signed?: yes  PMH:  has a past medical history of Depression; Gout; Hyperlipidemia; Hypertension; Neuropathy ([address] and Type II or unspecified type diabetes mellitus without mention of complication, not stated as uncontrolled. Medications:  Scheduled Meds:   pantoprazole  40 mg Oral QAM AC    miconazole   Topical BID    potassium replacement protocol   Other RX Placeholder    cefTRIAXone (ROCEPHIN) IV  1 g Intravenous Q24H    ipratropium-albuterol  1 ampule Inhalation Q4H WA    enoxaparin  40 mg Subcutaneous BID    insulin glargine  30 Units Subcutaneous Nightly    DULoxetine  30 mg Oral Daily    gabapentin  300 mg Oral TID    mirtazapine  30 mg Oral Nightly    azithromycin  500 mg Oral Daily    thiamine (VITAMIN B1) IVPB  100 mg Intravenous Q24H    vancomycin (VANCOCIN) intermittent dosing (placeholder)   Other RX Placeholder     Continuous Infusions:   IV infusion builder 125 mL/hr at 03/08/18 1431    dextrose      propofol 40 mcg/kg/min (03/08/18 1126)    insulin (HUMAN R) non-weight based infusion 9.6 Units/hr (03/08/18 1404)    sodium chloride Stopped (03/08/18 0012)    propofol Stopped (03/08/18 1400)      Pertinent Info/Orders/Treatment Plan: Pt admitted with SOB and hyperglycemia. Pt intubated 3/7 for respiratory distress in ED. CT shows collapse of RYAN, 3/8 bronch done. Insulin gtt, propofol gtt and IV fluids continue.    Diet: Diet NPO Effective Now   Vital Signs: /64   Pulse 100   Temp 100 °F (37.8 °C) (Core)   Resp 22   Ht 5' 9\" (1.753 m)   Wt (!) 360 lb (163.3 kg)   SpO2 100%   BMI 53.16 kg/m²   DVT Prophylaxis: lovenox  Influenza Vaccination Screening Completed: yes  Pneumonia Vaccination Screening Completed: yes  Core measures: pneumonia  PCP: Clovis Anthony CNP  Readmission: no  Risk Score:

## 2018-03-08 NOTE — PROGRESS NOTES
Progress Note    Patient:  Rivera Alcantara    Unit/Bed:3A-05/005-A  YOB: 1955  MRN: [id number]   Acct: [de-identified]   Admit date: 3/7/2018      Active Problems:    Sepsis ([facility]    Pneumonia    Diabetic ketoacidosis without coma associated with type 2 diabetes mellitus ([facility]    Diabetic ketoacidosis without coma associated with type 1 diabetes mellitus ([facility]  Resolved Problems:    * No resolved hospital problems. *        Assessment and Plan:  1. Acute hypoxemic respiratory failure:  Patient intubated yesterday. PEEP 5, FIO2 25%. Hopefully wean trials soon. Pulm/crit care consulted  2. DKA:  AG closed X1, repeat AG if closed can start to wean drip. 3. Insulin dependent DM: Will restart lantus if repeat AG is closed. Check a1c  4. Mixed acidosis: On bicarb drip, ABG normalizing  5. RYAN Pneumonia:  contineu with IV antibiotics, monitor cultures  6. VTE prophylaxis: Lovenox        Patient Seen, Chart, Consults notes, Labs, Radiology studies reviewed. Subjective: Day 1 of stay with DKA and pneumonia  Patient seen and examined at bedside, sedated on vent    All other ROS negative except noted in HPI    Past, Family, Social History unchanged from admission.     Diet:  Diet NPO Effective Now    Medications:  Scheduled Meds:   pantoprazole  40 mg Oral QAM AC    miconazole   Topical BID    sodium phosphate IVPB  20 mmol Intravenous Once    potassium replacement protocol   Other RX Placeholder    potassium chloride  20 mEq Intravenous Q1H    DULoxetine  30 mg Oral Daily    gabapentin  300 mg Oral TID    mirtazapine  30 mg Oral Nightly    enoxaparin  40 mg Subcutaneous Daily    cefepime  2 g Intravenous Q8H    vancomycin  1,500 mg Intravenous Q12H    azithromycin  500 mg Oral Daily    thiamine (VITAMIN B1) IVPB  100 mg Intravenous Q24H    vancomycin (VANCOCIN) intermittent dosing (placeholder)   Other RX Placeholder     Continuous Infusions:   propofol 40 mcg/kg/min (03/08/18 0826) There is a Anne catheter in the urinary bladder. No acute inflammatory or infectious process in the abdomen or pelvis. No evidence of bowel obstruction. No urinary tract calculi. No hydroureteronephrosis. Additional findings as detailed above. **This report has been created using voice recognition software. It may contain minor errors which are inherent in voice recognition technology. ** Final report electronically signed by Dr. Matheus Chavez on 3/8/2018 12:01 AM    Ct Head Wo Contrast    Result Date: 3/7/2018  PROCEDURE: CT HEAD WO CONTRAST CLINICAL INFORMATION: delirium DKA. COMPARISON: None TECHNIQUE: Noncontrast 5 mm axial images were obtained through the brain. All CT scans at this facility use dose modulation, iterative reconstruction, and/or weight-based dosing when appropriate to reduce radiation dose to as low as reasonably achievable. FINDINGS: Brain: There is no acute ischemic infarct, hemorrhage, midline shift, mass, or mass effect. There is no focal abnormality of brain parenchymal attenuation. Ventricles/basal cisterns: The ventricles and cisterns are of appropriate size and configuration for the patient's age. No evidence of obstructive hydrocephalus. Skull base/calvarium: Unremarkable Scalp soft tissues: Unremarkable Intraorbital contents: Unremarkable Sinuses: There is a tiny polyp or mucosal thickening in the right maxillary sinus. Remainder of the sinuses are clear. Mastoids: Unremarkable     No acute ischemic infarct, hemorrhage, or mass effect. **This report has been created using voice recognition software. It may contain minor errors which are inherent in voice recognition technology. ** Final report electronically signed by Dr. Matheus Chavez on 3/7/2018 11:38 PM    Ct Chest Wo Contrast    Result Date: 3/7/2018  PROCEDURE: CT CHEST WO CONTRAST CLINICAL INFORMATION: sepsis pneumonia.  COMPARISON: Chest x-ray earlier today TECHNIQUE: 5 mm noncontrast axial images were obtained through the vascular crowding. Pulmonary vascular congestion cannot be entirely excluded. Correlation with symptoms advised. **This report has been created using voice recognition software. It may contain minor errors which are inherent in voice recognition technology. ** Final report electronically signed by Dr. Zoila Allison on 3/7/2018 7:17 PM        Physical Exam:  Vitals: BP (!) 129/53   Pulse 68   Temp 97.9 °F (36.6 °C) (Core)   Resp 22   Ht 5' 9\" (1.753 m)   Wt (!) 360 lb (163.3 kg)   SpO2 99%   BMI 53.16 kg/m²   24 hour intake/output:  Intake/Output Summary (Last 24 hours) at 03/08/18 1026  Last data filed at 03/08/18 0900   Gross per 24 hour   Intake             4650 ml   Output             1250 ml   Net             3400 ml     Last 3 weights:   Wt Readings from Last 3 Encounters:   03/07/18 (!) 360 lb (163.3 kg)   07/10/14 (!) 315 lb (142.9 kg)   04/10/14 (!) 325 lb (147.4 kg)       General appearance -sedated on vent appears to be in no acute distress  Chest - Bilateral air entry, no wheezes, crackles or rhonchi  Cardiovascular - S1S2 RRR, no murmurs or gallops  Abdomen - Soft non tender non distended, normoactive bowel sounds   Extremities: No peripheral edema    DVT prophylaxis: [x] Lovenox                                 [] SCDs                                 [] SQ Heparin                                 [] Encourage ambulation           [] Already on Anticoagulation               Electronically signed by Olivia Newsome MD on 3/8/2018 at 10:26 AM    Rounding Hospitalist

## 2018-03-08 NOTE — PROGRESS NOTES
2348- Pt arrived to the floor ED, and respiratory therapy. Pt IV's infusing and on monitor. 65- Brother called to get update, pt not , does not have any children, twin brother Anthonycarmen Saritha next of kin. Gave HIPPA code to brother.

## 2018-03-08 NOTE — ED NOTES
Report called to [facility], CCU.      Marva Brown RN  03/07/18 9226

## 2018-03-08 NOTE — CONSULTS
per tablet, Take 1 tablet by mouth every 4 hours as needed for Pain  FREESTYLE LITE strip, TEST as directed four times a day  gabapentin (NEURONTIN) 300 MG capsule, take 1 capsule by mouth three times a day  indomethacin (INDOCIN) 50 MG capsule, take 1 capsule by mouth three times a day for 5 days  lansoprazole (PREVACID) 30 MG capsule, take 1 capsule by mouth once daily  mirtazapine (REMERON SOL-TAB) 30 MG disintegrating tablet, place 1 tablet ON TONGUE at bedtime  DULoxetine (CYMBALTA) 30 MG capsule, take 1 capsule by mouth once daily  insulin glargine (LANTUS) 100 UNIT/ML injection vial, Inject 53 Units into the skin nightly. (Patient taking differently: Inject 60 Units into the skin nightly Indications: takes 30 units in am and 30 in pm )  amLODIPine (NORVASC) 10 MG tablet, Take 1 tablet by mouth daily. metFORMIN (GLUCOPHAGE) 1000 MG tablet, Take 1 tablet by mouth 2 times daily (with meals). aspirin EC 81 MG EC tablet, Take 1 tablet by mouth daily. rosuvastatin (CRESTOR) 20 MG tablet, Take 1 tablet by mouth daily. lisinopril-hydrochlorothiazide (PRINZIDE;ZESTORETIC) 20-12.5 MG per tablet, Take 1 tablet by mouth daily. Aspirin-Caffeine (MERE BACK & BODY PAIN EX ST) 500-32.5 MG TABS, Take  by mouth. Patient takes a total of 3 tablets daily  Insulin Syringe-Needle U-100 (B-D INS SYR ULTRAFINE 1CC/31G) 31G X 5/16\" 1 ML MISC, Inject 1 each into the skin daily. sitaGLIPtan (JANUVIA) 100 MG tablet, Take 1 tablet by mouth daily. Lancets MISC, Apply 1 each topically 2 times daily. acetaminophen 650 MG TABS, Take 650 mg by mouth every 4 hours as needed for Pain (For mild pain level 1-3 or for fever > 100.5). Blood Glucose Monitoring Suppl (FREESTYLE FREEDOM LITE) W/DEVICE KIT, by Does not apply route. Please dispense strips  Syringe, Disposable, 1 ML MISC, Inject  into the skin. Alcohol Swabs PADS,   Insulin Pen Needle (UNIFINE PENTIPS) 31G X 8 MM MISC, by Does not apply route.   Diet/Nutrition   Diet NPO

## 2018-03-08 NOTE — H&P
pneumonia. ABGs  initially pH 7.18, CO2 of 14, O2 of 142, bicarb is 5, base excess -27, 99%  FiO2 of 4. He was given bicarb, put on insulin drip and fluids. Repeated  pH shows was 7.04, CO2 of 26, O2 of 121, bicarb is 7, base excess -22. It  has gotten worse. Base excess is now -22. He is on intubated tidal volume  440, respiratory 22, PEEP of 5. The patient had deteriorated. I asked for  him to be transferred to room 4 and I asked the ER physician to intubate  the patient. He did that easily, and I wanted a central line in the  patient. ASSESSMENT:  The patient is septic. He has pneumonia, most likely probably  has influenza. He is in full-blown diabetic ketoacidosis which has been  triggered by the fact that he does not take his medications, does not check  his blood sugar and he is also having an infection. He is in severe  metabolic acidosis with Kussmaul's breathing and severe metabolic acidosis,  pH 7.0. A body can only be sustained with a pH of 7.31 to 7.42. He is  fluid depleted. All DKA patients have lost at least 5 to 6 liters of  fluid. He has hyperkalemia which is false because this is due to lack of  insulin with potassium extravasation. His magnesium level has not been  checked. EKG shows sinus tachycardia, left axis deviation, cannot rule out  anterior infarct. PLAN:  1. The patient is admitted to ICU. We will re-bolus the insulin, 0.1 unit  per kg per minute, comes to 15 units, and 0.1 unit per kg body weight bolus  and 0.1 unit per kg body weight as a drip to start with. I am giving him 4  units of sodium bicarbonate, at the same time increasing the sodium  bicarbonate rate to 100 an hour, 2 amps in a liter of D5W. IV fluid  replacement is main form of treatment in type 2 diabetes. We are giving  him 2 liters of wide-open normal saline. His proBNP was normal.  Then, we  will run him at 500 mL an hour until we get to 4 liters. He is making  urine.   He has a Anne catheter. If he is overloaded, we will give him  Lasix. Check his electrolytes every 4 hours, be on  magnesium-phosphorous-potassium replacement. Follow the glucose stabilizer  regimen. Follow the blood pH as a means of regulating the patient rather  than the beta-hydroxybutyrate acid. Control his pain with fentanyl 50 mcg  every hour. Blood cultures x2, urine culture, urine tox screen, CPK for  neuroleptic malignant syndrome because he is on neuroleptic medications. Does not get better, tomorrow we will consult Renal.  Vancomycin 1 gm q.8,  cefepime 2 gm q.8, azithromycin 500 mg daily. Influenza A and B is check  for. Urine culture. CAT scan of the chest, abdomen and pelvis to look for  any occult infection including gallbladder. If he does not get better, if  the white count does not get better, we will get an ultrasound of the  gallbladder and check for his prostate for hidden infections. Prognosis is  guarded in this patient. He is on DVT prophylaxis with Lovenox 40 mg  subcutaneously daily. Because I cannot establish his alcohol status, I am  giving him thiamine 100 mg intravenously daily. He will be sedated with  Diprivan and fentanyl. Blood sugar will be checked every 30 minutes to  every hour until we get him stabilized. Critical care time spent on patient 2 hours from 7:30 to 9:30.         Julio C Michelle MD, [facility]    D: 03/07/2018 21:26:46       T: 03/07/2018 21:30:51     [email or web address]  Job#: [id number]     Doc#: [id number]    CC:

## 2018-03-08 NOTE — PROGRESS NOTES
Bronchoscopy completed, BAL performed left upper lobe, pt tolerated well. Annmarie Thomas RN, participated in procedure and will recover pt.

## 2018-03-09 LAB
ALBUMIN SERPL-MCNC: 2.8 G/DL (ref 3.5–5.1)
ALP BLD-CCNC: 65 U/L (ref 38–126)
ALT SERPL-CCNC: 6 U/L (ref 11–66)
ANION GAP SERPL CALCULATED.3IONS-SCNC: 15 MEQ/L (ref 8–16)
AST SERPL-CCNC: 8 U/L (ref 5–40)
BACTERIA: ABNORMAL /HPF
BILIRUB SERPL-MCNC: 0.5 MG/DL (ref 0.3–1.2)
BILIRUBIN URINE: NEGATIVE
BLOOD, URINE: ABNORMAL
BUN BLDV-MCNC: 19 MG/DL (ref 7–22)
CALCIUM SERPL-MCNC: 7.8 MG/DL (ref 8.5–10.5)
CASTS 2: ABNORMAL /LPF
CASTS UA: ABNORMAL /LPF
CHARACTER, URINE: CLEAR
CHLORIDE BLD-SCNC: 101 MEQ/L (ref 98–111)
CO2: 20 MEQ/L (ref 23–33)
COLOR: YELLOW
CREAT SERPL-MCNC: 0.8 MG/DL (ref 0.4–1.2)
CRYSTALS, UA: ABNORMAL
EPITHELIAL CELLS, UA: ABNORMAL /HPF
GFR SERPL CREATININE-BSD FRML MDRD: > 90 ML/MIN/1.73M2
GLUCOSE BLD-MCNC: 266 MG/DL (ref 70–108)
GLUCOSE BLD-MCNC: 277 MG/DL (ref 70–108)
GLUCOSE BLD-MCNC: 289 MG/DL (ref 70–108)
GLUCOSE BLD-MCNC: 305 MG/DL (ref 70–108)
GLUCOSE BLD-MCNC: 339 MG/DL (ref 70–108)
GLUCOSE URINE: >= 1000 MG/DL
HCT VFR BLD CALC: 38.4 % (ref 42–52)
HEMOGLOBIN: 13.3 GM/DL (ref 14–18)
KETONES, URINE: 40
LEUKOCYTE ESTERASE, URINE: NEGATIVE
MAGNESIUM: 2.1 MG/DL (ref 1.6–2.4)
MCH RBC QN AUTO: 29.8 PG (ref 27–31)
MCHC RBC AUTO-ENTMCNC: 34.6 GM/DL (ref 33–37)
MCV RBC AUTO: 86.2 FL (ref 80–94)
MISCELLANEOUS 2: ABNORMAL
NITRITE, URINE: NEGATIVE
PDW BLD-RTO: 14.3 % (ref 11.5–14.5)
PH UA: 6
PHOSPHORUS: 2.8 MG/DL (ref 2.4–4.7)
PLATELET # BLD: 167 THOU/MM3 (ref 130–400)
PMV BLD AUTO: 10.7 FL (ref 7.4–10.4)
POTASSIUM SERPL-SCNC: 3.5 MEQ/L (ref 3.5–5.2)
PROTEIN UA: ABNORMAL
RBC # BLD: 4.46 MILL/MM3 (ref 4.7–6.1)
RBC URINE: ABNORMAL /HPF
RENAL EPITHELIAL, UA: ABNORMAL
SODIUM BLD-SCNC: 136 MEQ/L (ref 135–145)
SPECIFIC GRAVITY, URINE: 1.03 (ref 1–1.03)
TOTAL PROTEIN: 5.2 G/DL (ref 6.1–8)
UROBILINOGEN, URINE: 1 EU/DL
WBC # BLD: 8.6 THOU/MM3 (ref 4.8–10.8)
WBC UA: ABNORMAL /HPF
YEAST: ABNORMAL

## 2018-03-09 PROCEDURE — 87449 NOS EACH ORGANISM AG IA: CPT

## 2018-03-09 PROCEDURE — 94660 CPAP INITIATION&MGMT: CPT

## 2018-03-09 PROCEDURE — 6360000002 HC RX W HCPCS: Performed by: HOSPITALIST

## 2018-03-09 PROCEDURE — 83735 ASSAY OF MAGNESIUM: CPT

## 2018-03-09 PROCEDURE — 6370000000 HC RX 637 (ALT 250 FOR IP): Performed by: INTERNAL MEDICINE

## 2018-03-09 PROCEDURE — 6360000002 HC RX W HCPCS: Performed by: NURSE PRACTITIONER

## 2018-03-09 PROCEDURE — 85027 COMPLETE CBC AUTOMATED: CPT

## 2018-03-09 PROCEDURE — 84100 ASSAY OF PHOSPHORUS: CPT

## 2018-03-09 PROCEDURE — 82948 REAGENT STRIP/BLOOD GLUCOSE: CPT

## 2018-03-09 PROCEDURE — 2580000003 HC RX 258: Performed by: INTERNAL MEDICINE

## 2018-03-09 PROCEDURE — 6370000000 HC RX 637 (ALT 250 FOR IP): Performed by: HOSPITALIST

## 2018-03-09 PROCEDURE — 36415 COLL VENOUS BLD VENIPUNCTURE: CPT

## 2018-03-09 PROCEDURE — 80053 COMPREHEN METABOLIC PANEL: CPT

## 2018-03-09 PROCEDURE — 2580000003 HC RX 258: Performed by: NURSE PRACTITIONER

## 2018-03-09 PROCEDURE — 99232 SBSQ HOSP IP/OBS MODERATE 35: CPT | Performed by: HOSPITALIST

## 2018-03-09 PROCEDURE — 1200000003 HC TELEMETRY R&B

## 2018-03-09 PROCEDURE — 81001 URINALYSIS AUTO W/SCOPE: CPT

## 2018-03-09 PROCEDURE — 94640 AIRWAY INHALATION TREATMENT: CPT

## 2018-03-09 PROCEDURE — 6370000000 HC RX 637 (ALT 250 FOR IP): Performed by: NURSE PRACTITIONER

## 2018-03-09 PROCEDURE — 87899 AGENT NOS ASSAY W/OPTIC: CPT

## 2018-03-09 PROCEDURE — 6360000002 HC RX W HCPCS: Performed by: INTERNAL MEDICINE

## 2018-03-09 PROCEDURE — 2700000000 HC OXYGEN THERAPY PER DAY

## 2018-03-09 PROCEDURE — 99232 SBSQ HOSP IP/OBS MODERATE 35: CPT | Performed by: NURSE PRACTITIONER

## 2018-03-09 RX ORDER — INSULIN GLARGINE 100 [IU]/ML
53 INJECTION, SOLUTION SUBCUTANEOUS NIGHTLY
Status: DISCONTINUED | OUTPATIENT
Start: 2018-03-09 | End: 2018-03-10

## 2018-03-09 RX ORDER — INSULIN GLARGINE 100 [IU]/ML
20 INJECTION, SOLUTION SUBCUTANEOUS EVERY MORNING
Status: DISCONTINUED | OUTPATIENT
Start: 2018-03-10 | End: 2018-03-10 | Stop reason: SDUPTHER

## 2018-03-09 RX ORDER — INSULIN GLARGINE 100 [IU]/ML
23 INJECTION, SOLUTION SUBCUTANEOUS ONCE
Status: COMPLETED | OUTPATIENT
Start: 2018-03-09 | End: 2018-03-09

## 2018-03-09 RX ORDER — POTASSIUM CHLORIDE 750 MG/1
40 TABLET, FILM COATED, EXTENDED RELEASE ORAL ONCE
Status: COMPLETED | OUTPATIENT
Start: 2018-03-09 | End: 2018-03-09

## 2018-03-09 RX ADMIN — PANTOPRAZOLE SODIUM 40 MG: 40 TABLET, DELAYED RELEASE ORAL at 07:59

## 2018-03-09 RX ADMIN — INSULIN LISPRO 3 UNITS: 100 INJECTION, SOLUTION INTRAVENOUS; SUBCUTANEOUS at 07:59

## 2018-03-09 RX ADMIN — MIRTAZAPINE 30 MG: 30 TABLET, FILM COATED ORAL at 20:55

## 2018-03-09 RX ADMIN — IPRATROPIUM BROMIDE AND ALBUTEROL SULFATE 1 AMPULE: .5; 3 SOLUTION RESPIRATORY (INHALATION) at 17:11

## 2018-03-09 RX ADMIN — AZITHROMYCIN 500 MG: 250 TABLET, FILM COATED ORAL at 07:59

## 2018-03-09 RX ADMIN — GABAPENTIN 300 MG: 300 CAPSULE ORAL at 13:49

## 2018-03-09 RX ADMIN — POTASSIUM CHLORIDE AND SODIUM CHLORIDE: 900; 150 INJECTION, SOLUTION INTRAVENOUS at 00:36

## 2018-03-09 RX ADMIN — INSULIN LISPRO 4 UNITS: 100 INJECTION, SOLUTION INTRAVENOUS; SUBCUTANEOUS at 11:37

## 2018-03-09 RX ADMIN — IPRATROPIUM BROMIDE AND ALBUTEROL SULFATE 1 AMPULE: .5; 3 SOLUTION RESPIRATORY (INHALATION) at 22:11

## 2018-03-09 RX ADMIN — INSULIN GLARGINE 53 UNITS: 100 INJECTION, SOLUTION SUBCUTANEOUS at 20:56

## 2018-03-09 RX ADMIN — IPRATROPIUM BROMIDE AND ALBUTEROL SULFATE 1 AMPULE: .5; 3 SOLUTION RESPIRATORY (INHALATION) at 10:00

## 2018-03-09 RX ADMIN — POTASSIUM CHLORIDE 40 MEQ: 750 TABLET, FILM COATED, EXTENDED RELEASE ORAL at 09:36

## 2018-03-09 RX ADMIN — MICONAZOLE NITRATE: 2 POWDER TOPICAL at 09:36

## 2018-03-09 RX ADMIN — ENOXAPARIN SODIUM 40 MG: 40 INJECTION SUBCUTANEOUS at 20:55

## 2018-03-09 RX ADMIN — THIAMINE HYDROCHLORIDE 100 MG: 100 INJECTION, SOLUTION INTRAMUSCULAR; INTRAVENOUS at 00:36

## 2018-03-09 RX ADMIN — GABAPENTIN 300 MG: 300 CAPSULE ORAL at 07:59

## 2018-03-09 RX ADMIN — ENOXAPARIN SODIUM 40 MG: 40 INJECTION SUBCUTANEOUS at 09:39

## 2018-03-09 RX ADMIN — POTASSIUM CHLORIDE AND SODIUM CHLORIDE: 900; 150 INJECTION, SOLUTION INTRAVENOUS at 20:59

## 2018-03-09 RX ADMIN — INSULIN LISPRO 5 UNITS: 100 INJECTION, SOLUTION INTRAVENOUS; SUBCUTANEOUS at 20:56

## 2018-03-09 RX ADMIN — DULOXETINE HYDROCHLORIDE 30 MG: 30 CAPSULE, DELAYED RELEASE ORAL at 07:59

## 2018-03-09 RX ADMIN — GABAPENTIN 300 MG: 300 CAPSULE ORAL at 20:55

## 2018-03-09 RX ADMIN — MICONAZOLE NITRATE: 2 POWDER TOPICAL at 20:55

## 2018-03-09 RX ADMIN — INSULIN GLARGINE 23 UNITS: 100 INJECTION, SOLUTION SUBCUTANEOUS at 09:32

## 2018-03-09 RX ADMIN — INSULIN LISPRO 9 UNITS: 100 INJECTION, SOLUTION INTRAVENOUS; SUBCUTANEOUS at 17:37

## 2018-03-09 RX ADMIN — CEFTRIAXONE 1 G: 1 INJECTION, POWDER, FOR SOLUTION INTRAMUSCULAR; INTRAVENOUS at 13:04

## 2018-03-09 ASSESSMENT — PAIN SCALES - GENERAL
PAINLEVEL_OUTOF10: 0
PAINLEVEL_OUTOF10: 7
PAINLEVEL_OUTOF10: 5

## 2018-03-09 ASSESSMENT — PAIN DESCRIPTION - LOCATION
LOCATION: THROAT
LOCATION: THROAT

## 2018-03-09 ASSESSMENT — PAIN DESCRIPTION - DESCRIPTORS
DESCRIPTORS: SORE
DESCRIPTORS: SORE

## 2018-03-09 ASSESSMENT — PAIN DESCRIPTION - PAIN TYPE
TYPE: ACUTE PAIN
TYPE: ACUTE PAIN
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
## 2018-03-09 NOTE — PROGRESS NOTES
Nightly    DULoxetine  30 mg Oral Daily    gabapentin  300 mg Oral TID    mirtazapine  30 mg Oral Nightly    azithromycin  500 mg Oral Daily    thiamine (VITAMIN B1) IVPB  100 mg Intravenous Q24H    vancomycin (VANCOCIN) intermittent dosing (placeholder)   Other RX Placeholder     Continuous Infusions:   dextrose      0.9% NaCl with KCl 20 mEq 125 mL/hr at 03/09/18 0036    propofol 40 mcg/kg/min (03/08/18 1126)    sodium chloride Stopped (03/08/18 0012)    propofol Stopped (03/08/18 1400)     PRN Meds:glucose, dextrose, glucagon (rDNA), dextrose, insulin regular, etomidate, rocuronium, potassium chloride, magnesium sulfate, sodium phosphate IVPB **OR** sodium phosphate IVPB **OR** sodium phosphate IVPB, fentanNYL    Objective:  CBC:   Recent Labs      03/07/18   1720  03/09/18   0545   WBC  18.3*  8.6   HGB  16.5  13.3*   PLT  304  167     BMP:    Recent Labs      03/08/18   0730  03/08/18   1157  03/08/18   1310  03/08/18   2242  03/09/18   0545   NA  133*  135   --    --   136   K  2.4*  3.2*  3.1*  3.6  3.5   CL  98  100   --    --   101   CO2  20*  21*   --    --   20*   BUN  28*  25*   --    --   19   CREATININE  0.9  0.9   --    --   0.8   GLUCOSE  206*  135*   --    --   305*     Calcium:  Recent Labs      03/09/18   0545   CALCIUM  7.8*     Ionized Calcium:No results for input(s): IONCA in the last 72 hours. Magnesium:  Recent Labs      03/08/18   2242   MG  1.8     Phosphorus:  Recent Labs      03/08/18   1730   PHOS  1.1*     BNP:No results for input(s): BNP in the last 72 hours. Glucose:  Recent Labs      03/08/18   1713  03/08/18   2111  03/09/18   0651   POCGLU  120*  195*  266*     HgbA1C:   Recent Labs      03/07/18   1720   LABA1C  8.4*     INR: No results for input(s): INR in the last 72 hours.   Hepatic:   Recent Labs      03/07/18   1720  03/09/18   0545   ALKPHOS  94  65   ALT  10*  6*   AST  7  8   PROT  6.7  5.2*   BILITOT  0.3  0.5   BILIDIR  <0.2   --    LABALBU  3.7  2.8* right maxillary sinus. Remainder of the sinuses are clear. Mastoids: Unremarkable     No acute ischemic infarct, hemorrhage, or mass effect. **This report has been created using voice recognition software. It may contain minor errors which are inherent in voice recognition technology. ** Final report electronically signed by Dr. Wynelle Frankel on 3/7/2018 11:38 PM    Ct Chest Wo Contrast    Result Date: 3/7/2018  PROCEDURE: CT CHEST WO CONTRAST CLINICAL INFORMATION: sepsis pneumonia. COMPARISON: Chest x-ray earlier today TECHNIQUE: 5 mm noncontrast axial images were obtained through the chest. Sagittal and coronal reconstructions were obtained. All CT scans at this facility use dose modulation, iterative reconstruction, and/or weight-based dosing when appropriate to reduce radiation dose to as low as reasonably achievable. FINDINGS: Limitations: Evaluation of the mediastinum and vascular structures is limited due to the lack of IV contrast. Lungs: There are low lung volumes. There is left upper lobe consolidation which may represent a combination of pneumonia and volume loss. There is lingular and bilateral lower lobe atelectasis. There is a tiny left upper lobe calcified granuloma. The trachea and major bronchi are unremarkable. Pleura: There is a possible small left pleural effusion No pneumothorax. Heart: Heart size is normal. There is no pericardial effusion. Ellie and mediastinum: There is no obvious mass or adenopathy. There is a left hilar calcified lymph node. Thoracic aorta/vascular: No thoracic aortic aneurysm. Imaged upper abdomen: See the accompanying abdomen pelvis CT report Musculoskeletal system: There are degenerative changes of the spine. Chest/body wall soft tissues: Unremarkable Thyroid: Unremarkable Lines/tubes/devices: Endotracheal tube tip lies approximately 1.5 cm above the merle. NG tube is looped in the gastric fundus       Low lung volumes.  Left upper lobe consolidation possibly a combination of pneumonia and volume loss. Lingular and bilateral lower lobe atelectasis. Granulomatous disease. Possible small left pleural effusion. Satisfactory positions of the endotracheal and nasogastric tubes. **This report has been created using voice recognition software. It may contain minor errors which are inherent in voice recognition technology. ** Final report electronically signed by Dr. Bennett Muniz on 3/7/2018 11:44 PM    Xr Chest Portable    Result Date: 3/7/2018  PROCEDURE: XR CHEST PORTABLE CLINICAL INFORMATION: line placement, . COMPARISON: Chest x-ray earlier today at 6:34 PM TECHNIQUE: AP Portable supine chest xray FINDINGS: Lungs/pleura: There are again low lung volumes with interval development of left lower lobe atelectasis, less likely pneumonia. Mild medial right basilar atelectasis also noted. Cannot exclude a new tiny left pleural effusion. No pneumothorax. Heart: Unremarkable. No cardiomegaly. Mediastinum/arabella: Unremarkable. No obvious mass or adenopathy. Skeleton: There is multilevel vertebral endplate spondylosis. Lines/tubes/devices: Interval intubation, ET tube tip 3.6 cm above the merle. Interval NG tube placement, looped in the region of the gastric fundus. Despite the history of \"line placement\", no vascular catheter is seen. Satisfactory positions of the endotracheal and nasogastric tubes. No vascular catheter identified, see above. Low lung volumes with bibasilar atelectasis, pneumonia less likely. Possible tiny left pleural effusion. **This report has been created using voice recognition software. It may contain minor errors which are inherent in voice recognition technology. ** Final report electronically signed by Dr. Bennett Muniz on 3/7/2018 10:33 PM    Xr Chest Portable    Result Date: 3/7/2018  PROCEDURE: XR CHEST PORTABLE CLINICAL INFORMATION: Shortness of breath, . COMPARISON: No prior study.  TECHNIQUE: AP Portable chest xray FINDINGS: Markedly limited exam due to low lung volumes and portable technique. No definite lobar consolidation. Right apex is poorly visualized. There is apparent prominence of the pulmonary vessels and hilar structures however this is thought to be related to vascular crowding. Pulmonary vascular congestion cannot be entirely excluded. Costophrenic recesses appear preserved. No acute osseous findings. Ectatic thoracic aorta. No cardiomegaly. No definite lobar consolidation. Right apex is poorly visualized. There is apparent prominence of the pulmonary vessels and hilar structures however this is thought to be related to vascular crowding. Pulmonary vascular congestion cannot be entirely excluded. Correlation with symptoms advised. **This report has been created using voice recognition software. It may contain minor errors which are inherent in voice recognition technology. ** Final report electronically signed by Dr. Olivier Dale on 3/7/2018 7:17 PM        Physical Exam:  Vitals: BP (!) 148/54   Pulse 88   Temp 99.1 °F (37.3 °C) (Core)   Resp 21   Ht 5' 9\" (1.753 m)   Wt (!) 360 lb (163.3 kg)   SpO2 98%   BMI 53.16 kg/m²   24 hour intake/output:    Intake/Output Summary (Last 24 hours) at 03/09/18 0925  Last data filed at 03/09/18 0550   Gross per 24 hour   Intake             8819 ml   Output             2750 ml   Net             6069 ml     Last 3 weights:   Wt Readings from Last 3 Encounters:   03/07/18 (!) 360 lb (163.3 kg)   07/10/14 (!) 315 lb (142.9 kg)   04/10/14 (!) 325 lb (147.4 kg)       General appearance -sedated on vent appears to be in no acute distress  Chest - Bilateral air entry, no wheezes, crackles or rhonchi  Cardiovascular - S1S2 RRR, no murmurs or gallops  Abdomen - Soft non tender non distended, normoactive bowel sounds   Extremities: No peripheral edema    DVT prophylaxis: [x] Lovenox                                 [] SCDs                                 [] SQ Heparin                                 [] Encourage ambulation           [] Already on Anticoagulation               Electronically signed by Carolina Ledesma MD on 3/9/2018 at 9:25 AM    Rounding Hospitalist

## 2018-03-09 NOTE — PLAN OF CARE
Problem: Infection:  Goal: Will remain free from infection  Will remain free from infection   Outcome: Ongoing  Pt has low grade temp. Continue to monitor WBC and temp. Antibiotics as ordered    Problem: Safety:  Goal: Free from accidental physical injury  Free from accidental physical injury   Outcome: Met This Shift  Pt extubated and alert. Cooperative with use of call light. Frequent checks and hourly rounds to assess needs    Problem: Daily Care:  Goal: Daily care needs are met  Daily care needs are met   Outcome: Met This Shift  Daily care completed by nurses. Problem: Pain:  Goal: Patient's pain/discomfort is manageable  Patient's pain/discomfort is manageable   Outcome: Ongoing  Pt denies pain or need for medication at this time    Problem: Skin Integrity:  Goal: Skin integrity will stabilize  Skin integrity will stabilize   Outcome: Ongoing  Open wound on coccyx. Sacral border continued. Wound and ostomy nurses in to evaluate. Problem: Discharge Planning:  Goal: Patients continuum of care needs are met  Patients continuum of care needs are met   Outcome: Ongoing      Problem: Nutrition  Goal: Optimal nutrition therapy  Outcome: Ongoing  Pt extubated today and clear liquids taken. Advance diet tomorrow as tolerated    Problem: Falls - Risk of  Goal: Absence of falls  Outcome: Ongoing  No fall this shift. Pt is alert and cooperative with use of call light. Frequent checks and hourly rounds to assess needs    Problem: Risk for Impaired Skin Integrity  Goal: Tissue integrity - skin and mucous membranes  Structural intactness and normal physiological function of skin and  mucous membranes. Outcome: Ongoing  No new skin breakdown. Open area on coccyx. Turn and reposition every 2 hours and PRN. Heels and elbows elevated off bed    Comments: Care plan reviewed with patient and brother. Patient and brother verbalize understanding of the plan of care and contribute to goal setting.

## 2018-03-09 NOTE — PROGRESS NOTES
Nutrition Assessment    Type and Reason for Visit: Reassess, Patient Education, Consult    Nutrition Recommendations: Will revisit to continue diet teaching. Malnutrition Assessment:  · Malnutrition Status: No malnutrition    Nutrition Diagnosis:   · Problem: Food and nutrition-related knowledge deficit  · Etiology: related to Endocrine dysfunction     Signs and symptoms:  as evidenced by Patient report of    Nutrition Assessment:  · Subjective Assessment: Pt. extubated and would like some diet teaching before discharge. He consumes ~3/4 breakfast (icee, juice, jello). MD considering advancing diet. Labs include: (3/9) Glucose 305, Ca 7.8, Phos 1.1. 900ml emesiss/NG op (3/8). Pt. denies nausea/vomiting today. Meds include: Lantus, Humalog, Remeron, Thiamine. · Nutrition-Focused Physical Findings: Pt. extubated & consumed ~ 3/4 of clear liquid breakfast. Per diet hx, pt. tends to consume atleast a gallon of reg sweetened iced tea, 2 liters of rootbeer( 1/2reg, 1/2 diet). Mentions blood sugars have been running~ 370 before meals at home. Labs include: (3/9) Glucose 305, Ca 7.8, Phos 1.1. 900ml emesis/NG output 3/8, pt. denies nausea/vomiting today. Has not had diabetic diet teaching in the past per pt. Meds include: Lantus, Humalog, Remeron, Thiamine.     · Wound Type: Stage II (sacrum)  · Current Nutrition Therapies:  · Oral Diet Orders: Clear Liquid, Carb Control 4 Carbs/Meal   · Oral Diet intake: 51-75%  · Anthropometric Measures:  · Ht: 5' 9\" (175.3 cm)   · Current Body Wt: 360 lb (163.3 kg) (3/7/18, no weight source, generalized edema)  · Admission Body Wt: 360 lb (163.3 kg) (3/7/18, no weight source, generalized edema)  · Usual Body Wt: 315 lb (142.9 kg) (EHR, no weight source, 7/10/14)  · % Weight Change: n/a,     · Ideal Body Wt: 160 lb (72.6 kg), % Ideal Body 225%  · Adjusted Body Wt: 210 lb (95.3 kg), body weight adjusted for Obesity  · BMI Classification: BMI > or equal to 40.0 Obese Class

## 2018-03-09 NOTE — PLAN OF CARE
Problem: Impaired respiratory status  Goal: Will be able to breathe spontaneously, without ventilator support  Will be able to breathe spontaneously, without ventilator support     Outcome: Completed Date Met: 03/08/18

## 2018-03-09 NOTE — PROGRESS NOTES
adjusted for Obesity  · BMI Classification: BMI > or equal to 40.0 Obese Class III (53.3)  · Comparative Standards (Estimated Nutrition Needs):  · Estimated Daily Total Kcal: ~2375 (25 kcals/kg adjusted weight of 95 kg)  · Estimated Daily Protein (g): 8722-9736    Estimated Intake vs Estimated Needs: Intake Improving    Nutrition Risk Level: Moderate    Nutrition Interventions:   Continue current diet (advance diet as tolerated to 1800 calorie carb controlled)  Continued Inpatient Monitoring, Education Completed (1800kcal carb controlled heart healthy diet education completed (3/9))Dicouraged sweet tea & pop. Nutrition Evaluation:   · Evaluation: Progressing toward goals   · Goals: Pt. will voice basic  understanding of 1800kcal carb controlled diet. · Monitoring: Meal Intake, Patient/Family Education, Diet Tolerance, Skin Integrity, Ascites/Edema, Weight, Comparative Standards, Pertinent Labs, Nausea or Vomiting    See Adult Nutrition Doc Flowsheet for more detail.      Electronically signed by Marely Malhotra RD, LD on 3/9/18 at 4:06 PM    Contact [phone]

## 2018-03-10 LAB
ALBUMIN SERPL-MCNC: 2.8 G/DL (ref 3.5–5.1)
ANION GAP SERPL CALCULATED.3IONS-SCNC: 13 MEQ/L (ref 8–16)
ANISOCYTOSIS: ABNORMAL
BASOPHILS # BLD: 0.1 %
BASOPHILS ABSOLUTE: 0 THOU/MM3 (ref 0–0.1)
BUN BLDV-MCNC: 13 MG/DL (ref 7–22)
CALCIUM SERPL-MCNC: 7.9 MG/DL (ref 8.5–10.5)
CHLORIDE BLD-SCNC: 105 MEQ/L (ref 98–111)
CO2: 21 MEQ/L (ref 23–33)
CREAT SERPL-MCNC: 0.7 MG/DL (ref 0.4–1.2)
EOSINOPHIL # BLD: 2.4 %
EOSINOPHILS ABSOLUTE: 0.2 THOU/MM3 (ref 0–0.4)
GFR SERPL CREATININE-BSD FRML MDRD: > 90 ML/MIN/1.73M2
GLUCOSE BLD-MCNC: 187 MG/DL (ref 70–108)
GLUCOSE BLD-MCNC: 197 MG/DL (ref 70–108)
GLUCOSE BLD-MCNC: 259 MG/DL (ref 70–108)
GLUCOSE BLD-MCNC: 273 MG/DL (ref 70–108)
GLUCOSE BLD-MCNC: 285 MG/DL (ref 70–108)
GLUCOSE BLD-MCNC: 285 MG/DL (ref 70–108)
HCT VFR BLD CALC: 38.4 % (ref 42–52)
HEMOGLOBIN: 12.9 GM/DL (ref 14–18)
LYMPHOCYTES # BLD: 18.7 %
LYMPHOCYTES ABSOLUTE: 1.7 THOU/MM3 (ref 1–4.8)
MCH RBC QN AUTO: 28.6 PG (ref 27–31)
MCHC RBC AUTO-ENTMCNC: 33.6 GM/DL (ref 33–37)
MCV RBC AUTO: 85.3 FL (ref 80–94)
MONOCYTES # BLD: 1.6 %
MONOCYTES ABSOLUTE: 0.1 THOU/MM3 (ref 0.4–1.3)
MRSA SCREEN: NORMAL
NUCLEATED RED BLOOD CELLS: 0 /100 WBC
PDW BLD-RTO: 15.2 % (ref 11.5–14.5)
PHOSPHORUS: 1.2 MG/DL (ref 2.4–4.7)
PLATELET # BLD: 188 THOU/MM3 (ref 130–400)
PMV BLD AUTO: 10.1 FL (ref 7.4–10.4)
POTASSIUM SERPL-SCNC: 3.6 MEQ/L (ref 3.5–5.2)
RBC # BLD: 4.5 MILL/MM3 (ref 4.7–6.1)
SEG NEUTROPHILS: 77.2 %
SEGMENTED NEUTROPHILS ABSOLUTE COUNT: 7 THOU/MM3 (ref 1.8–7.7)
SODIUM BLD-SCNC: 139 MEQ/L (ref 135–145)
VRE CULTURE: NORMAL
WBC # BLD: 9.1 THOU/MM3 (ref 4.8–10.8)

## 2018-03-10 PROCEDURE — 6370000000 HC RX 637 (ALT 250 FOR IP): Performed by: HOSPITALIST

## 2018-03-10 PROCEDURE — 6370000000 HC RX 637 (ALT 250 FOR IP): Performed by: NURSE PRACTITIONER

## 2018-03-10 PROCEDURE — 36415 COLL VENOUS BLD VENIPUNCTURE: CPT

## 2018-03-10 PROCEDURE — 99233 SBSQ HOSP IP/OBS HIGH 50: CPT | Performed by: HOSPITALIST

## 2018-03-10 PROCEDURE — 6360000002 HC RX W HCPCS: Performed by: HOSPITALIST

## 2018-03-10 PROCEDURE — 2580000003 HC RX 258: Performed by: INTERNAL MEDICINE

## 2018-03-10 PROCEDURE — 1200000003 HC TELEMETRY R&B

## 2018-03-10 PROCEDURE — 94660 CPAP INITIATION&MGMT: CPT

## 2018-03-10 PROCEDURE — 80069 RENAL FUNCTION PANEL: CPT

## 2018-03-10 PROCEDURE — 6360000002 HC RX W HCPCS: Performed by: INTERNAL MEDICINE

## 2018-03-10 PROCEDURE — 82948 REAGENT STRIP/BLOOD GLUCOSE: CPT

## 2018-03-10 PROCEDURE — 94640 AIRWAY INHALATION TREATMENT: CPT

## 2018-03-10 PROCEDURE — 6370000000 HC RX 637 (ALT 250 FOR IP): Performed by: INTERNAL MEDICINE

## 2018-03-10 PROCEDURE — 85025 COMPLETE CBC W/AUTO DIFF WBC: CPT

## 2018-03-10 PROCEDURE — 99232 SBSQ HOSP IP/OBS MODERATE 35: CPT | Performed by: NURSE PRACTITIONER

## 2018-03-10 PROCEDURE — 2700000000 HC OXYGEN THERAPY PER DAY

## 2018-03-10 RX ORDER — INSULIN GLARGINE 100 [IU]/ML
20 INJECTION, SOLUTION SUBCUTANEOUS ONCE
Status: COMPLETED | OUTPATIENT
Start: 2018-03-10 | End: 2018-03-10

## 2018-03-10 RX ORDER — THIAMINE HCL 50 MG
100 TABLET ORAL DAILY
Status: DISCONTINUED | OUTPATIENT
Start: 2018-03-11 | End: 2018-03-14 | Stop reason: HOSPADM

## 2018-03-10 RX ORDER — INSULIN GLARGINE 100 [IU]/ML
40 INJECTION, SOLUTION SUBCUTANEOUS 2 TIMES DAILY
Status: DISCONTINUED | OUTPATIENT
Start: 2018-03-10 | End: 2018-03-13

## 2018-03-10 RX ORDER — CEFUROXIME AXETIL 250 MG/1
500 TABLET ORAL EVERY 12 HOURS SCHEDULED
Status: DISCONTINUED | OUTPATIENT
Start: 2018-03-10 | End: 2018-03-11

## 2018-03-10 RX ORDER — AZITHROMYCIN 250 MG/1
250 TABLET, FILM COATED ORAL DAILY
Status: DISCONTINUED | OUTPATIENT
Start: 2018-03-11 | End: 2018-03-11

## 2018-03-10 RX ADMIN — IPRATROPIUM BROMIDE AND ALBUTEROL SULFATE 1 AMPULE: .5; 3 SOLUTION RESPIRATORY (INHALATION) at 08:24

## 2018-03-10 RX ADMIN — MICONAZOLE NITRATE: 2 POWDER TOPICAL at 08:43

## 2018-03-10 RX ADMIN — AZITHROMYCIN 500 MG: 250 TABLET, FILM COATED ORAL at 09:03

## 2018-03-10 RX ADMIN — INSULIN LISPRO 3 UNITS: 100 INJECTION, SOLUTION INTRAVENOUS; SUBCUTANEOUS at 08:42

## 2018-03-10 RX ADMIN — INSULIN GLARGINE 40 UNITS: 100 INJECTION, SOLUTION SUBCUTANEOUS at 20:39

## 2018-03-10 RX ADMIN — Medication 10 UNITS: at 17:59

## 2018-03-10 RX ADMIN — IPRATROPIUM BROMIDE AND ALBUTEROL SULFATE 1 AMPULE: .5; 3 SOLUTION RESPIRATORY (INHALATION) at 21:52

## 2018-03-10 RX ADMIN — IPRATROPIUM BROMIDE AND ALBUTEROL SULFATE 1 AMPULE: .5; 3 SOLUTION RESPIRATORY (INHALATION) at 11:55

## 2018-03-10 RX ADMIN — MIRTAZAPINE 30 MG: 30 TABLET, FILM COATED ORAL at 20:39

## 2018-03-10 RX ADMIN — GABAPENTIN 300 MG: 300 CAPSULE ORAL at 14:21

## 2018-03-10 RX ADMIN — IPRATROPIUM BROMIDE AND ALBUTEROL SULFATE 1 AMPULE: .5; 3 SOLUTION RESPIRATORY (INHALATION) at 16:34

## 2018-03-10 RX ADMIN — THIAMINE HYDROCHLORIDE 100 MG: 100 INJECTION, SOLUTION INTRAMUSCULAR; INTRAVENOUS at 00:56

## 2018-03-10 RX ADMIN — INSULIN GLARGINE 20 UNITS: 100 INJECTION, SOLUTION SUBCUTANEOUS at 08:42

## 2018-03-10 RX ADMIN — INSULIN LISPRO 9 UNITS: 100 INJECTION, SOLUTION INTRAVENOUS; SUBCUTANEOUS at 12:30

## 2018-03-10 RX ADMIN — ENOXAPARIN SODIUM 40 MG: 40 INJECTION SUBCUTANEOUS at 20:39

## 2018-03-10 RX ADMIN — DULOXETINE HYDROCHLORIDE 30 MG: 30 CAPSULE, DELAYED RELEASE ORAL at 08:41

## 2018-03-10 RX ADMIN — ENOXAPARIN SODIUM 40 MG: 40 INJECTION SUBCUTANEOUS at 08:42

## 2018-03-10 RX ADMIN — PANTOPRAZOLE SODIUM 40 MG: 40 TABLET, DELAYED RELEASE ORAL at 08:41

## 2018-03-10 RX ADMIN — INSULIN LISPRO 9 UNITS: 100 INJECTION, SOLUTION INTRAVENOUS; SUBCUTANEOUS at 18:01

## 2018-03-10 RX ADMIN — INSULIN GLARGINE 20 UNITS: 100 INJECTION, SOLUTION SUBCUTANEOUS at 15:58

## 2018-03-10 RX ADMIN — GABAPENTIN 300 MG: 300 CAPSULE ORAL at 08:41

## 2018-03-10 RX ADMIN — MICONAZOLE NITRATE: 2 POWDER TOPICAL at 20:39

## 2018-03-10 RX ADMIN — GABAPENTIN 300 MG: 300 CAPSULE ORAL at 20:39

## 2018-03-10 RX ADMIN — INSULIN LISPRO 2 UNITS: 100 INJECTION, SOLUTION INTRAVENOUS; SUBCUTANEOUS at 20:40

## 2018-03-10 RX ADMIN — CEFUROXIME AXETIL 500 MG: 250 TABLET ORAL at 20:39

## 2018-03-10 ASSESSMENT — PAIN SCALES - GENERAL: PAINLEVEL_OUTOF10: 0

## 2018-03-10 NOTE — PLAN OF CARE
Problem: RESPIRATORY  Goal: Clear lung sounds  Outcome: Ongoing  Breath sounds clear and diminished, pt on duoneb every 4 hours while awake.

## 2018-03-10 NOTE — PROGRESS NOTES
and ancillary staff note reviewed    Feels better  No new complain   and off insulin one week prior to admission secondary to recent move. Diet: DIET GENERAL; Carb Control: 4 carbs/meal (approximate 1800 kcals/day)      Medications:  Reviewed    Infusion Medications    dextrose       Scheduled Medications    cefUROXime  500 mg Oral 2 times per day    [START ON 3/11/2018] azithromycin  250 mg Oral Daily    insulin glargine  40 Units Subcutaneous BID    insulin glargine  20 Units Subcutaneous Once    insulin lispro  10 Units Subcutaneous TID WC    insulin lispro  0-18 Units Subcutaneous TID WC    insulin lispro  0-9 Units Subcutaneous Nightly    insulin glargine  20 Units Subcutaneous QAM    pantoprazole  40 mg Oral QAM AC    miconazole   Topical BID    potassium replacement protocol   Other RX Placeholder    ipratropium-albuterol  1 ampule Inhalation Q4H WA    enoxaparin  40 mg Subcutaneous BID    magnesium replacement protocol   Other RX Placeholder    DULoxetine  30 mg Oral Daily    gabapentin  300 mg Oral TID    mirtazapine  30 mg Oral Nightly    thiamine (VITAMIN B1) IVPB  100 mg Intravenous Q24H    vancomycin (VANCOCIN) intermittent dosing (placeholder)   Other RX Placeholder     PRN Meds: glucose, dextrose, glucagon (rDNA), dextrose, rocuronium, potassium chloride, magnesium sulfate, sodium phosphate IVPB **OR** sodium phosphate IVPB **OR** sodium phosphate IVPB, fentanNYL      Intake/Output Summary (Last 24 hours) at 03/10/18 1438  Last data filed at 03/10/18 0836   Gross per 24 hour   Intake          2035.69 ml   Output             2250 ml   Net          -214.31 ml       Diet:  DIET GENERAL; Carb Control: 4 carbs/meal (approximate 1800 kcals/day)    Exam:  /70   Pulse 92   Temp 99 °F (37.2 °C) (Oral)   Resp 20   Ht 5' 9\" (1.753 m)   Wt (!) 360 lb (163.3 kg)   SpO2 95%   BMI 53.16 kg/m²      Gen: Not in distress. Alert. disheveled   Head: Normocephalic. Atraumatic. Eyes: Conjunctivae/corneas clear. ENT: Oral mucosa moist  Neck: No JVD. No obvious thyromegaly. CVS: Nml S1S2, no MRG, RRR  Pulmomary: fair air entry   Gastrointestinal: Soft, non tender, non distend,  Positive bowel sounds. Musculoskeletal: minimal edema. Warm  Neuro: No focal deficit. Moves extremity spontaneously. Psychiatry: Appropriate affect. Not agitated. Labs:   Recent Labs      03/07/18   1720  03/09/18   0545  03/10/18   1005   WBC  18.3*  8.6  9.1   HGB  16.5  13.3*  12.9*   HCT  51.0  38.4*  38.4*   PLT  304  167  188     Recent Labs      03/08/18   1157   03/08/18   1730  03/08/18   2242  03/09/18   0545  03/10/18   1005   NA  135   --    --    --   136  139   K  3.2*   < >   --   3.6  3.5  3.6   CL  100   --    --    --   101  105   CO2  21*   --    --    --   20*  21*   BUN  25*   --    --    --   19  13   CREATININE  0.9   --    --    --   0.8  0.7   CALCIUM  7.8*   --    --    --   7.8*  7.9*   PHOS   --    < >  1.1*   --   2.8  1.2*    < > = values in this interval not displayed. Recent Labs      03/07/18   1720  03/09/18   0545   AST  7  8   ALT  10*  6*   BILIDIR  <0.2   --    BILITOT  0.3  0.5   ALKPHOS  94  65     No results for input(s): INR in the last 72 hours. Recent Labs      03/07/18   2149   CKTOTAL  62       Urinalysis:    Lab Results   Component Value Date    NITRU NEGATIVE 03/09/2018    WBCUA 0-2 03/09/2018    BACTERIA NONE 03/09/2018    RBCUA 15-25 03/09/2018    BLOODU MODERATE 03/09/2018    GLUCOSEU >= 1000 03/09/2018       Radiology:  CT ABDOMEN PELVIS WO CONTRAST Additional Contrast? None   Final Result   No acute inflammatory or infectious process in the abdomen or pelvis. No evidence of bowel obstruction. No urinary tract calculi. No hydroureteronephrosis. Additional findings as detailed above. **This report has been created using voice recognition software.  It may contain minor errors which are inherent in voice recognition errors which are inherent in voice recognition technology. **      Final report electronically signed by Dr. Reno Ledbetter on 3/7/2018 7:17 PM                  Electronically signed by Shaaron Leyden, MD on 3/10/2018 at 2:38 PM
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
## 2018-03-10 NOTE — PLAN OF CARE
Problem: RESPIRATORY  Goal: Clear lung sounds  Outcome: Ongoing  Pt has diminished breath sounds. txs to help improve lung aeration.

## 2018-03-10 NOTE — PLAN OF CARE
Problem: Infection:  Goal: Will remain free from infection  Will remain free from infection   Outcome: Ongoing  Afebrile, VS WNL. 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 Comments: Care plan reviewed with patient. Patient verbalizes understanding of the plan of care and contributes to goal setting.

## 2018-03-11 LAB
ALBUMIN SERPL-MCNC: 2.6 G/DL (ref 3.5–5.1)
ANION GAP SERPL CALCULATED.3IONS-SCNC: 12 MEQ/L (ref 8–16)
ANISOCYTOSIS: ABNORMAL
BASOPHILS # BLD: 1.4 %
BASOPHILS ABSOLUTE: 0.1 THOU/MM3 (ref 0–0.1)
BUN BLDV-MCNC: 12 MG/DL (ref 7–22)
CALCIUM SERPL-MCNC: 8.1 MG/DL (ref 8.5–10.5)
CHLORIDE BLD-SCNC: 107 MEQ/L (ref 98–111)
CO2: 22 MEQ/L (ref 23–33)
CREAT SERPL-MCNC: 0.7 MG/DL (ref 0.4–1.2)
EOSINOPHIL # BLD: 3.8 %
EOSINOPHILS ABSOLUTE: 0.3 THOU/MM3 (ref 0–0.4)
GFR SERPL CREATININE-BSD FRML MDRD: > 90 ML/MIN/1.73M2
GLUCOSE BLD-MCNC: 122 MG/DL (ref 70–108)
GLUCOSE BLD-MCNC: 140 MG/DL (ref 70–108)
GLUCOSE BLD-MCNC: 174 MG/DL (ref 70–108)
GLUCOSE BLD-MCNC: 242 MG/DL (ref 70–108)
GLUCOSE BLD-MCNC: 258 MG/DL (ref 70–108)
HCT VFR BLD CALC: 35.9 % (ref 42–52)
HEMOGLOBIN: 12.1 GM/DL (ref 14–18)
LEGIONELLA URINARY AG: NEGATIVE
LYMPHOCYTES # BLD: 23.1 %
LYMPHOCYTES ABSOLUTE: 2.1 THOU/MM3 (ref 1–4.8)
MCH RBC QN AUTO: 29.3 PG (ref 27–31)
MCHC RBC AUTO-ENTMCNC: 33.7 GM/DL (ref 33–37)
MCV RBC AUTO: 86.9 FL (ref 80–94)
MONOCYTES # BLD: 7.4 %
MONOCYTES ABSOLUTE: 0.7 THOU/MM3 (ref 0.4–1.3)
NUCLEATED RED BLOOD CELLS: 0 /100 WBC
PDW BLD-RTO: 15.4 % (ref 11.5–14.5)
PHOSPHORUS: 1.7 MG/DL (ref 2.4–4.7)
PLATELET # BLD: 203 THOU/MM3 (ref 130–400)
PMV BLD AUTO: 9.9 FL (ref 7.4–10.4)
POTASSIUM SERPL-SCNC: 3.6 MEQ/L (ref 3.5–5.2)
RBC # BLD: 4.13 MILL/MM3 (ref 4.7–6.1)
SEG NEUTROPHILS: 64.3 %
SEGMENTED NEUTROPHILS ABSOLUTE COUNT: 5.9 THOU/MM3 (ref 1.8–7.7)
SODIUM BLD-SCNC: 141 MEQ/L (ref 135–145)
STREP PNEUMO AG, UR: NEGATIVE
WBC # BLD: 9.1 THOU/MM3 (ref 4.8–10.8)

## 2018-03-11 PROCEDURE — 94640 AIRWAY INHALATION TREATMENT: CPT

## 2018-03-11 PROCEDURE — 94660 CPAP INITIATION&MGMT: CPT

## 2018-03-11 PROCEDURE — 6370000000 HC RX 637 (ALT 250 FOR IP): Performed by: NURSE PRACTITIONER

## 2018-03-11 PROCEDURE — G8979 MOBILITY GOAL STATUS: HCPCS

## 2018-03-11 PROCEDURE — 2580000003 HC RX 258

## 2018-03-11 PROCEDURE — 99233 SBSQ HOSP IP/OBS HIGH 50: CPT | Performed by: HOSPITALIST

## 2018-03-11 PROCEDURE — 82948 REAGENT STRIP/BLOOD GLUCOSE: CPT

## 2018-03-11 PROCEDURE — 6360000002 HC RX W HCPCS: Performed by: HOSPITALIST

## 2018-03-11 PROCEDURE — 80069 RENAL FUNCTION PANEL: CPT

## 2018-03-11 PROCEDURE — G8978 MOBILITY CURRENT STATUS: HCPCS

## 2018-03-11 PROCEDURE — 97162 PT EVAL MOD COMPLEX 30 MIN: CPT

## 2018-03-11 PROCEDURE — 2500000003 HC RX 250 WO HCPCS

## 2018-03-11 PROCEDURE — 85025 COMPLETE CBC W/AUTO DIFF WBC: CPT

## 2018-03-11 PROCEDURE — 6370000000 HC RX 637 (ALT 250 FOR IP): Performed by: HOSPITALIST

## 2018-03-11 PROCEDURE — 6370000000 HC RX 637 (ALT 250 FOR IP): Performed by: INTERNAL MEDICINE

## 2018-03-11 PROCEDURE — 2700000000 HC OXYGEN THERAPY PER DAY

## 2018-03-11 PROCEDURE — 97530 THERAPEUTIC ACTIVITIES: CPT

## 2018-03-11 PROCEDURE — 36415 COLL VENOUS BLD VENIPUNCTURE: CPT

## 2018-03-11 PROCEDURE — 1200000003 HC TELEMETRY R&B

## 2018-03-11 RX ORDER — CEFUROXIME AXETIL 250 MG/1
500 TABLET ORAL EVERY 12 HOURS SCHEDULED
Status: COMPLETED | OUTPATIENT
Start: 2018-03-11 | End: 2018-03-14

## 2018-03-11 RX ADMIN — GABAPENTIN 300 MG: 300 CAPSULE ORAL at 14:46

## 2018-03-11 RX ADMIN — PANTOPRAZOLE SODIUM 40 MG: 40 TABLET, DELAYED RELEASE ORAL at 05:00

## 2018-03-11 RX ADMIN — CEFUROXIME AXETIL 500 MG: 250 TABLET ORAL at 20:13

## 2018-03-11 RX ADMIN — IPRATROPIUM BROMIDE AND ALBUTEROL SULFATE 1 AMPULE: .5; 3 SOLUTION RESPIRATORY (INHALATION) at 13:46

## 2018-03-11 RX ADMIN — Medication 10 UNITS: at 12:59

## 2018-03-11 RX ADMIN — THIAMINE HCL (VITAMIN B1) 50 MG TABLET 100 MG: at 08:51

## 2018-03-11 RX ADMIN — SODIUM PHOSPHATE, MONOBASIC, MONOHYDRATE 15 MMOL: 276; 142 INJECTION, SOLUTION INTRAVENOUS at 10:31

## 2018-03-11 RX ADMIN — Medication 10 UNITS: at 17:13

## 2018-03-11 RX ADMIN — MICONAZOLE NITRATE: 2 POWDER TOPICAL at 20:13

## 2018-03-11 RX ADMIN — CEFUROXIME AXETIL 500 MG: 250 TABLET ORAL at 08:51

## 2018-03-11 RX ADMIN — MICONAZOLE NITRATE: 2 POWDER TOPICAL at 08:57

## 2018-03-11 RX ADMIN — MIRTAZAPINE 30 MG: 30 TABLET, FILM COATED ORAL at 20:13

## 2018-03-11 RX ADMIN — ENOXAPARIN SODIUM 40 MG: 40 INJECTION SUBCUTANEOUS at 08:51

## 2018-03-11 RX ADMIN — DULOXETINE HYDROCHLORIDE 30 MG: 30 CAPSULE, DELAYED RELEASE ORAL at 08:51

## 2018-03-11 RX ADMIN — IPRATROPIUM BROMIDE AND ALBUTEROL SULFATE 1 AMPULE: .5; 3 SOLUTION RESPIRATORY (INHALATION) at 16:51

## 2018-03-11 RX ADMIN — IPRATROPIUM BROMIDE AND ALBUTEROL SULFATE 1 AMPULE: .5; 3 SOLUTION RESPIRATORY (INHALATION) at 21:30

## 2018-03-11 RX ADMIN — INSULIN GLARGINE 40 UNITS: 100 INJECTION, SOLUTION SUBCUTANEOUS at 21:45

## 2018-03-11 RX ADMIN — AZITHROMYCIN 250 MG: 250 TABLET, FILM COATED ORAL at 08:51

## 2018-03-11 RX ADMIN — GABAPENTIN 300 MG: 300 CAPSULE ORAL at 08:51

## 2018-03-11 RX ADMIN — IPRATROPIUM BROMIDE AND ALBUTEROL SULFATE 1 AMPULE: .5; 3 SOLUTION RESPIRATORY (INHALATION) at 09:41

## 2018-03-11 RX ADMIN — INSULIN GLARGINE 40 UNITS: 100 INJECTION, SOLUTION SUBCUTANEOUS at 08:53

## 2018-03-11 RX ADMIN — Medication 10 UNITS: at 08:51

## 2018-03-11 RX ADMIN — INSULIN LISPRO 3 UNITS: 100 INJECTION, SOLUTION INTRAVENOUS; SUBCUTANEOUS at 12:58

## 2018-03-11 RX ADMIN — INSULIN LISPRO 6 UNITS: 100 INJECTION, SOLUTION INTRAVENOUS; SUBCUTANEOUS at 17:15

## 2018-03-11 RX ADMIN — ENOXAPARIN SODIUM 40 MG: 40 INJECTION SUBCUTANEOUS at 21:45

## 2018-03-11 RX ADMIN — GABAPENTIN 300 MG: 300 CAPSULE ORAL at 20:13

## 2018-03-11 RX ADMIN — INSULIN LISPRO 5 UNITS: 100 INJECTION, SOLUTION INTRAVENOUS; SUBCUTANEOUS at 21:46

## 2018-03-11 ASSESSMENT — PAIN SCALES - GENERAL
PAINLEVEL_OUTOF10: 0

## 2018-03-11 NOTE — PLAN OF CARE
Problem: Infection:  Goal: Will remain free from infection  Will remain free from infection   Outcome: Ongoing  Patient remains afebrile. Problem: Safety:  Goal: Free from accidental physical injury  Free from accidental physical injury   Outcome: Ongoing  Fall assessment completed. Patient using call light appropriately to call for assistance with ambulation to bathroom. Personal items within reach. Patient is also compliant with use of non-skid slippers. Problem: Daily Care:  Goal: Daily care needs are met  Daily care needs are met   Outcome: Ongoing  Patient assisted with daily cares. Problem: Pain:  Goal: Patient's pain/discomfort is manageable  Patient's pain/discomfort is manageable   Outcome: Ongoing  Patient denies pain when asked, will continue to monitor. Problem: Skin Integrity:  Goal: Skin integrity will stabilize  Skin integrity will stabilize   Outcome: Ongoing  No skin breakdown this shift. Patient turns per self and encouraged to turn. Patients states understanding of repositioning every two hours. Problem: Discharge Planning:  Goal: Patients continuum of care needs are met  Patients continuum of care needs are met   Outcome: Ongoing  Discharge plan is ongoing, patient from home with siblings. Problem: Nutrition  Goal: Optimal nutrition therapy  Outcome: Ongoing  Patients appetite good. Continuing to encourage fluids. Comments: Care plan reviewed with patient. Patient verbalize understanding of the plan of care and contribute to goal setting.

## 2018-03-12 ENCOUNTER — APPOINTMENT (OUTPATIENT)
Dept: GENERAL RADIOLOGY | Age: 63
DRG: 720 | End: 2018-03-12
Payer: COMMERCIAL

## 2018-03-12 LAB
ALBUMIN SERPL-MCNC: 2.7 G/DL (ref 3.5–5.1)
ANION GAP SERPL CALCULATED.3IONS-SCNC: 12 MEQ/L (ref 8–16)
ANISOCYTOSIS: ABNORMAL
BASOPHILS # BLD: 0.9 %
BASOPHILS ABSOLUTE: 0.1 THOU/MM3 (ref 0–0.1)
BUN BLDV-MCNC: 13 MG/DL (ref 7–22)
CALCIUM SERPL-MCNC: 8.4 MG/DL (ref 8.5–10.5)
CHLORIDE BLD-SCNC: 104 MEQ/L (ref 98–111)
CO2: 22 MEQ/L (ref 23–33)
CREAT SERPL-MCNC: 0.7 MG/DL (ref 0.4–1.2)
EOSINOPHIL # BLD: 5.1 %
EOSINOPHILS ABSOLUTE: 0.4 THOU/MM3 (ref 0–0.4)
GFR SERPL CREATININE-BSD FRML MDRD: > 90 ML/MIN/1.73M2
GLUCOSE BLD-MCNC: 139 MG/DL (ref 70–108)
GLUCOSE BLD-MCNC: 172 MG/DL (ref 70–108)
GLUCOSE BLD-MCNC: 196 MG/DL (ref 70–108)
GLUCOSE BLD-MCNC: 203 MG/DL (ref 70–108)
GLUCOSE BLD-MCNC: 226 MG/DL (ref 70–108)
HCT VFR BLD CALC: 36.9 % (ref 42–52)
HEMOGLOBIN: 12.4 GM/DL (ref 14–18)
LYMPHOCYTES # BLD: 26.2 %
LYMPHOCYTES ABSOLUTE: 2.1 THOU/MM3 (ref 1–4.8)
MCH RBC QN AUTO: 29.4 PG (ref 27–31)
MCHC RBC AUTO-ENTMCNC: 33.7 GM/DL (ref 33–37)
MCV RBC AUTO: 87.2 FL (ref 80–94)
MONOCYTES # BLD: 11.2 %
MONOCYTES ABSOLUTE: 0.9 THOU/MM3 (ref 0.4–1.3)
NUCLEATED RED BLOOD CELLS: 0 /100 WBC
PDW BLD-RTO: 15.1 % (ref 11.5–14.5)
PHOSPHORUS: 2.9 MG/DL (ref 2.4–4.7)
PLATELET # BLD: 215 THOU/MM3 (ref 130–400)
PMV BLD AUTO: 9.5 FL (ref 7.4–10.4)
POTASSIUM SERPL-SCNC: 3.6 MEQ/L (ref 3.5–5.2)
RBC # BLD: 4.23 MILL/MM3 (ref 4.7–6.1)
SEG NEUTROPHILS: 56.6 %
SEGMENTED NEUTROPHILS ABSOLUTE COUNT: 4.5 THOU/MM3 (ref 1.8–7.7)
SODIUM BLD-SCNC: 138 MEQ/L (ref 135–145)
WBC # BLD: 7.9 THOU/MM3 (ref 4.8–10.8)

## 2018-03-12 PROCEDURE — 97116 GAIT TRAINING THERAPY: CPT

## 2018-03-12 PROCEDURE — G8987 SELF CARE CURRENT STATUS: HCPCS

## 2018-03-12 PROCEDURE — 71045 X-RAY EXAM CHEST 1 VIEW: CPT

## 2018-03-12 PROCEDURE — 97110 THERAPEUTIC EXERCISES: CPT

## 2018-03-12 PROCEDURE — 99232 SBSQ HOSP IP/OBS MODERATE 35: CPT | Performed by: INTERNAL MEDICINE

## 2018-03-12 PROCEDURE — 94760 N-INVAS EAR/PLS OXIMETRY 1: CPT

## 2018-03-12 PROCEDURE — 94640 AIRWAY INHALATION TREATMENT: CPT

## 2018-03-12 PROCEDURE — 2700000000 HC OXYGEN THERAPY PER DAY

## 2018-03-12 PROCEDURE — 82948 REAGENT STRIP/BLOOD GLUCOSE: CPT

## 2018-03-12 PROCEDURE — G8988 SELF CARE GOAL STATUS: HCPCS

## 2018-03-12 PROCEDURE — APPSS30 APP SPLIT SHARED TIME 16-30 MINUTES: Performed by: NURSE PRACTITIONER

## 2018-03-12 PROCEDURE — 97530 THERAPEUTIC ACTIVITIES: CPT

## 2018-03-12 PROCEDURE — 97165 OT EVAL LOW COMPLEX 30 MIN: CPT

## 2018-03-12 PROCEDURE — 80069 RENAL FUNCTION PANEL: CPT

## 2018-03-12 PROCEDURE — 85025 COMPLETE CBC W/AUTO DIFF WBC: CPT

## 2018-03-12 PROCEDURE — 6360000002 HC RX W HCPCS: Performed by: HOSPITALIST

## 2018-03-12 PROCEDURE — 6370000000 HC RX 637 (ALT 250 FOR IP): Performed by: HOSPITALIST

## 2018-03-12 PROCEDURE — 6370000000 HC RX 637 (ALT 250 FOR IP): Performed by: NURSE PRACTITIONER

## 2018-03-12 PROCEDURE — 1200000003 HC TELEMETRY R&B

## 2018-03-12 PROCEDURE — 6370000000 HC RX 637 (ALT 250 FOR IP): Performed by: INTERNAL MEDICINE

## 2018-03-12 PROCEDURE — 94660 CPAP INITIATION&MGMT: CPT

## 2018-03-12 PROCEDURE — 36415 COLL VENOUS BLD VENIPUNCTURE: CPT

## 2018-03-12 RX ADMIN — GABAPENTIN 300 MG: 300 CAPSULE ORAL at 09:05

## 2018-03-12 RX ADMIN — IPRATROPIUM BROMIDE AND ALBUTEROL SULFATE 1 AMPULE: .5; 3 SOLUTION RESPIRATORY (INHALATION) at 16:23

## 2018-03-12 RX ADMIN — IPRATROPIUM BROMIDE AND ALBUTEROL SULFATE 1 AMPULE: .5; 3 SOLUTION RESPIRATORY (INHALATION) at 07:31

## 2018-03-12 RX ADMIN — Medication 10 UNITS: at 17:41

## 2018-03-12 RX ADMIN — GABAPENTIN 300 MG: 300 CAPSULE ORAL at 14:49

## 2018-03-12 RX ADMIN — IPRATROPIUM BROMIDE AND ALBUTEROL SULFATE 1 AMPULE: .5; 3 SOLUTION RESPIRATORY (INHALATION) at 13:33

## 2018-03-12 RX ADMIN — INSULIN LISPRO 3 UNITS: 100 INJECTION, SOLUTION INTRAVENOUS; SUBCUTANEOUS at 21:05

## 2018-03-12 RX ADMIN — Medication 10 UNITS: at 09:10

## 2018-03-12 RX ADMIN — IPRATROPIUM BROMIDE AND ALBUTEROL SULFATE 1 AMPULE: .5; 3 SOLUTION RESPIRATORY (INHALATION) at 20:07

## 2018-03-12 RX ADMIN — MICONAZOLE NITRATE: 2 POWDER TOPICAL at 09:13

## 2018-03-12 RX ADMIN — INSULIN GLARGINE 40 UNITS: 100 INJECTION, SOLUTION SUBCUTANEOUS at 21:05

## 2018-03-12 RX ADMIN — INSULIN LISPRO 3 UNITS: 100 INJECTION, SOLUTION INTRAVENOUS; SUBCUTANEOUS at 12:34

## 2018-03-12 RX ADMIN — PANTOPRAZOLE SODIUM 40 MG: 40 TABLET, DELAYED RELEASE ORAL at 06:39

## 2018-03-12 RX ADMIN — INSULIN GLARGINE 40 UNITS: 100 INJECTION, SOLUTION SUBCUTANEOUS at 09:02

## 2018-03-12 RX ADMIN — CEFUROXIME AXETIL 500 MG: 250 TABLET ORAL at 09:05

## 2018-03-12 RX ADMIN — THIAMINE HCL (VITAMIN B1) 50 MG TABLET 100 MG: at 09:05

## 2018-03-12 RX ADMIN — ENOXAPARIN SODIUM 40 MG: 40 INJECTION SUBCUTANEOUS at 21:05

## 2018-03-12 RX ADMIN — GABAPENTIN 300 MG: 300 CAPSULE ORAL at 21:05

## 2018-03-12 RX ADMIN — MICONAZOLE NITRATE: 2 POWDER TOPICAL at 21:05

## 2018-03-12 RX ADMIN — INSULIN LISPRO 3 UNITS: 100 INJECTION, SOLUTION INTRAVENOUS; SUBCUTANEOUS at 09:07

## 2018-03-12 RX ADMIN — Medication 10 UNITS: at 12:29

## 2018-03-12 RX ADMIN — ENOXAPARIN SODIUM 40 MG: 40 INJECTION SUBCUTANEOUS at 09:35

## 2018-03-12 RX ADMIN — MIRTAZAPINE 30 MG: 30 TABLET, FILM COATED ORAL at 21:04

## 2018-03-12 RX ADMIN — CEFUROXIME AXETIL 500 MG: 250 TABLET ORAL at 21:04

## 2018-03-12 RX ADMIN — DULOXETINE HYDROCHLORIDE 30 MG: 30 CAPSULE, DELAYED RELEASE ORAL at 09:05

## 2018-03-12 ASSESSMENT — PAIN DESCRIPTION - FREQUENCY: FREQUENCY: CONTINUOUS

## 2018-03-12 ASSESSMENT — PAIN SCALES - GENERAL
PAINLEVEL_OUTOF10: 0
PAINLEVEL_OUTOF10: 0
PAINLEVEL_OUTOF10: 2
PAINLEVEL_OUTOF10: 0

## 2018-03-12 ASSESSMENT — PAIN DESCRIPTION - ORIENTATION: ORIENTATION: RIGHT;ANTERIOR

## 2018-03-12 ASSESSMENT — PAIN SCALES - WONG BAKER: WONGBAKER_NUMERICALRESPONSE: 2

## 2018-03-12 ASSESSMENT — PAIN DESCRIPTION - LOCATION: LOCATION: SHOULDER

## 2018-03-12 ASSESSMENT — PAIN DESCRIPTION - DESCRIPTORS: DESCRIPTORS: SORE

## 2018-03-12 ASSESSMENT — PAIN DESCRIPTION - PROGRESSION: CLINICAL_PROGRESSION: NOT CHANGED

## 2018-03-12 ASSESSMENT — PAIN DESCRIPTION - PAIN TYPE: TYPE: ACUTE PAIN

## 2018-03-12 NOTE — PROGRESS NOTES
seen.        Impression     Satisfactory positions of the endotracheal and nasogastric tubes. No vascular catheter identified, see above. Low lung volumes with bibasilar atelectasis, pneumonia less likely. Possible tiny left pleural effusion.          CT Scans  CT chest WO:  Lungs: There are low lung volumes. There is left upper lobe consolidation which may represent a combination of pneumonia and volume loss. There is lingular and bilateral lower lobe atelectasis. There is a tiny left upper lobe calcified granuloma. The trachea and major bronchi are unremarkable.     Pleura: There is a possible small left pleural effusion No pneumothorax.     Heart: Heart size is normal.  There is no pericardial effusion.     Ellie and mediastinum: There is no obvious mass or adenopathy. There is a left hilar calcified lymph node.     Thoracic aorta/vascular: No thoracic aortic aneurysm.     Imaged upper abdomen: See the accompanying abdomen pelvis CT report     Musculoskeletal system: There are degenerative changes of the spine.     Chest/body wall soft tissues: Unremarkable     Thyroid: Unremarkable     Lines/tubes/devices: Endotracheal tube tip lies approximately 1.5 cm above the merle. NG tube is looped in the gastric fundus        Impression   Low lung volumes. Left upper lobe consolidation possibly a combination of pneumonia and volume loss. Lingular and bilateral lower lobe atelectasis. Granulomatous disease. Possible small left pleural effusion.   Satisfactory positions of the endotracheal and nasogastric tubes.     (See actual reports for details)  [address] Problems    Diagnosis Date Noted    Sepsis [facility]) [A41.9] 03/07/2018     Priority: High     Class: Acute    Community acquired pneumonia [J18.9] 03/07/2018     Priority: High     Class: Acute    Diabetic ketoacidosis without coma associated with type 1 diabetes mellitus ([facility] [E10.10]     Acute respiratory failure with
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
## 2018-03-12 NOTE — PROGRESS NOTES
Hospitalist Progress Note    Patient:  Gualberto Postal      Unit/Bed:5K-03/003-A    YOB: 1955    MRN: [id number]       Acct: [de-identified]     PCP: Malia Sun CNP    Date of Admission: 3/7/2018    Chief Complaint:   Chief Complaint   Patient presents with    Shortness of Breath    Hyperglycemia       Subjective:   BS improving. Mental status appears at baseline. Medications:  Reviewed    Infusion Medications    dextrose       Scheduled Medications    cefUROXime  500 mg Oral 2 times per day    insulin glargine  40 Units Subcutaneous BID    insulin lispro  10 Units Subcutaneous TID WC    vitamin B-1  100 mg Oral Daily    insulin lispro  0-18 Units Subcutaneous TID WC    insulin lispro  0-9 Units Subcutaneous Nightly    pantoprazole  40 mg Oral QAM AC    miconazole   Topical BID    potassium replacement protocol   Other RX Placeholder    ipratropium-albuterol  1 ampule Inhalation Q4H WA    enoxaparin  40 mg Subcutaneous BID    magnesium replacement protocol   Other RX Placeholder    DULoxetine  30 mg Oral Daily    gabapentin  300 mg Oral TID    mirtazapine  30 mg Oral Nightly     PRN Meds: glucose, dextrose, glucagon (rDNA), dextrose, rocuronium, potassium chloride, magnesium sulfate, sodium phosphate IVPB **OR** sodium phosphate IVPB **OR** sodium phosphate IVPB      Intake/Output Summary (Last 24 hours) at 03/12/18 1451  Last data filed at 03/12/18 0416   Gross per 24 hour   Intake             1660 ml   Output              240 ml   Net             1420 ml       Diet:  DIET GENERAL; Carb Control: 4 carbs/meal (approximate 1800 kcals/day)    Exam:  BP (!) 149/64   Pulse 87   Temp 98.5 °F (36.9 °C) (Oral)   Resp 20   Ht 5' 9\" (1.753 m)   Wt (!) 340 lb 4.8 oz (154.4 kg)   SpO2 96%   BMI 50.25 kg/m²     Gen/overall appearance: Not in acute distress. Alert.   Head: Normocephalic, atraumatic  Eyes: EOMI, no scleral icterus  CVS: regular rate and rhythm, Normal aorta/vascular: No thoracic aortic aneurysm. Imaged upper abdomen: See the accompanying abdomen pelvis CT report Musculoskeletal system: There are degenerative changes of the spine. Chest/body wall soft tissues: Unremarkable Thyroid: Unremarkable Lines/tubes/devices: Endotracheal tube tip lies approximately 1.5 cm above the merle. NG tube is looped in the gastric fundus       Low lung volumes. Left upper lobe consolidation possibly a combination of pneumonia and volume loss. Lingular and bilateral lower lobe atelectasis. Granulomatous disease. Possible small left pleural effusion. Satisfactory positions of the endotracheal and nasogastric tubes. **This report has been created using voice recognition software. It may contain minor errors which are inherent in voice recognition technology. ** Final report electronically signed by Dr. Katie Lau on 3/7/2018 11:44 PM    Xr Chest Portable    Result Date: 3/7/2018  PROCEDURE: XR CHEST PORTABLE CLINICAL INFORMATION: line placement, . COMPARISON: Chest x-ray earlier today at 6:34 PM TECHNIQUE: AP Portable supine chest xray FINDINGS: Lungs/pleura: There are again low lung volumes with interval development of left lower lobe atelectasis, less likely pneumonia. Mild medial right basilar atelectasis also noted. Cannot exclude a new tiny left pleural effusion. No pneumothorax. Heart: Unremarkable. No cardiomegaly. Mediastinum/arabella: Unremarkable. No obvious mass or adenopathy. Skeleton: There is multilevel vertebral endplate spondylosis. Lines/tubes/devices: Interval intubation, ET tube tip 3.6 cm above the merle. Interval NG tube placement, looped in the region of the gastric fundus. Despite the history of \"line placement\", no vascular catheter is seen. Satisfactory positions of the endotracheal and nasogastric tubes. No vascular catheter identified, see above. Low lung volumes with bibasilar atelectasis, pneumonia less likely. Possible tiny left pleural effusion. **This report has been created using voice recognition software. It may contain minor errors which are inherent in voice recognition technology. ** Final report electronically signed by Dr. Tulio Fernandez on 3/7/2018 10:33 PM    Xr Chest Portable    Result Date: 3/7/2018  PROCEDURE: XR CHEST PORTABLE CLINICAL INFORMATION: Shortness of breath, . COMPARISON: No prior study. TECHNIQUE: AP Portable chest xray FINDINGS: Markedly limited exam due to low lung volumes and portable technique. No definite lobar consolidation. Right apex is poorly visualized. There is apparent prominence of the pulmonary vessels and hilar structures however this is thought to be related to vascular crowding. Pulmonary vascular congestion cannot be entirely excluded. Costophrenic recesses appear preserved. No acute osseous findings. Ectatic thoracic aorta. No cardiomegaly. No definite lobar consolidation. Right apex is poorly visualized. There is apparent prominence of the pulmonary vessels and hilar structures however this is thought to be related to vascular crowding. Pulmonary vascular congestion cannot be entirely excluded. Correlation with symptoms advised. **This report has been created using voice recognition software. It may contain minor errors which are inherent in voice recognition technology. ** Final report electronically signed by Dr. Arty Klinefelter on 3/7/2018 7:17 PM      Diet: DIET GENERAL; Carb Control: 4 carbs/meal (approximate 1800 kcals/day)     Disposition:    [x] Home       [] TCU       [] Rehab       [] Psych       [x] SNF       [] [facility]       [] Other-    Code Status: Full Code    Assessment/Plan:    Active Hospital Problems    Diagnosis Date Noted    [facility]epsis ([facility] [A41.9] 03/07/2018     Priority: High     Class: Acute    Community acquired pneumonia [J18.9] 03/07/2018     Priority: High     Class: Acute    Diabetic ketoacidosis without coma associated with type 1 diabetes mellitus ([facility] [E10.10]    

## 2018-03-12 NOTE — PROGRESS NOTES
Intervention(s): Rest;Repositioned  Response to Pain Intervention: Patient Satisfied  Multiple Pain Sites: No       Social/Functional:  Lives With: Family (2 brothers and a brother's girlfriend)  Type of Home: Mobile home  Home Layout: One level  Home Access: Stairs to enter with rails  Entrance Stairs - Number of Steps: 3  Entrance Stairs - Rails: Left  Home Equipment: [employer]     Bathroom Shower/Tub: Tub/Shower unit  Bathroom Toilet: Standard  Bathroom Equipment: Shower chair  Bathroom Accessibility: Accessible  IADL Comments: Patient's brother's girlfriend completes the IADLs. Pt had a dog he was going to get as a licenced therapy dog. The dog  1 month ago. Receives Help From: Family  ADL Assistance: Independent  Homemaking Assistance: Needs assistance  Meal Prep: Stand by  Laundry: Stand by  Vacuuming: Maximal  Cleaning: Maximal  Gardening: Maximal  Yard Work: Maximal  Driving: Maximal  Shopping: Maximal  Homemaking Responsibilities: Yes  Meal Prep Responsibility: Primary  Laundry Responsibility: Primary    Ambulation Assistance: Needs assistance  Transfer Assistance: Independent    Active : No  Occupation: Unemployed  Leisure & Hobbies: watch classic movies, playing electronic poker  Additional Comments: Patient states he stopped walking about a month ago because he didn't feel like walking anymore. Since then, he has been primarily using a wheelchair to get around and states that he is able to transfer himself. He reports that he is able to propel himself in the wheelchair, except his hallway at home isn't wide enough so instead he \"sits down on the ground and scoots like a baby. \"    Objective  Vision - Basic Assessment  Visual History: Cataracts (R eye)     Overall Cognitive Status: [facility]  Cognition Comment: Verbal cues needed for new learning         Sensation  Overall Sensation Status: Impaired (Feet are numb continually)  Light Touch: Severe deficits in the LLE, Severe deficits the plan of care and no modification or assistance required to complete the evaluation. Patient Education:  Patient Education: OT POC; pt's goal; UE exercises with theraband; importance of protein in his diet; how your stress can effect blood sugars; ways in which he can increase his regularity of having bowel movements. Equipment Recommendations: Other: Will continued to monitor    Safety:  Safety Devices in place: Yes  Type of devices: Gait belt, Patient at risk for falls, Left in chair, Nurse notified, Chair alarm in place, Call light within reach    Plan:  Times per week: 3-5x  Current Treatment Recommendations: Strengthening, Functional Mobility Training, Endurance Training, Self-Care / ADL, Safety Education & Training  Plan Comment: Pt would benefit from continued OT services when medically stable and discharged from Acute. Specific instructions for Next Treatment: Functional mobility as able; lower body ADLs and Upper body HEP    Goals:  Patient goals : \"I want to get stronger and be able to return to being more active at home. \" pt states. Short term goals  Time Frame for Short term goals: 2 weeks  Short term goal 1: Pt will demonstrate functional transfers with OTR to prepare for doing self care while out of bed. Short term goal 2: Pt will complete lower body ADLs while using any AE needed with SBA to increase his independence with self care. Short term goal 3: Pt will complete BUE red theraband exercises x 5 sets of 8-10 reps each to increase his strength and endurance for ease of doing a shower. Long term goals  Time Frame for Long term goals : None secondary to short estimated length of stay. Evaluation Complexity: Based on the findings of patient history, examination, clinical presentation, and decision making during this evaluation, this patient is of low complexity.     AM-[facility] Inpatient Daily Activity Raw Score: 21  AM-[facility] Inpatient ADL T-Scale Score : 44.27  ADL Inpatient CMS 0-100% Score: 32.79  ADL Inpatient CMS G-Code Modifier : Chiquita Haynes

## 2018-03-12 NOTE — PLAN OF CARE
Problem: RESPIRATORY  Goal: Clear lung sounds  Outcome: Ongoing  Continue with aerosols to aid in bronchodilation.

## 2018-03-13 LAB
ALBUMIN SERPL-MCNC: 2.6 G/DL (ref 3.5–5.1)
ANAEROBIC CULTURE: NORMAL
ANION GAP SERPL CALCULATED.3IONS-SCNC: 14 MEQ/L (ref 8–16)
ANISOCYTOSIS: ABNORMAL
BASOPHILS # BLD: 1.2 %
BASOPHILS ABSOLUTE: 0.1 THOU/MM3 (ref 0–0.1)
BLOOD CULTURE, ROUTINE: NORMAL
BLOOD CULTURE, ROUTINE: NORMAL
BODY FLUID CULTURE, STERILE: NORMAL
BUN BLDV-MCNC: 12 MG/DL (ref 7–22)
CALCIUM SERPL-MCNC: 8.1 MG/DL (ref 8.5–10.5)
CHLORIDE BLD-SCNC: 104 MEQ/L (ref 98–111)
CO2: 22 MEQ/L (ref 23–33)
CREAT SERPL-MCNC: 0.7 MG/DL (ref 0.4–1.2)
EOSINOPHIL # BLD: 5.5 %
EOSINOPHILS ABSOLUTE: 0.3 THOU/MM3 (ref 0–0.4)
GFR SERPL CREATININE-BSD FRML MDRD: > 90 ML/MIN/1.73M2
GLUCOSE BLD-MCNC: 155 MG/DL (ref 70–108)
GLUCOSE BLD-MCNC: 166 MG/DL (ref 70–108)
GLUCOSE BLD-MCNC: 168 MG/DL (ref 70–108)
GLUCOSE BLD-MCNC: 202 MG/DL (ref 70–108)
GLUCOSE BLD-MCNC: 233 MG/DL (ref 70–108)
GRAM STAIN RESULT: NORMAL
HCT VFR BLD CALC: 35.7 % (ref 42–52)
HEMOGLOBIN: 12 GM/DL (ref 14–18)
LYMPHOCYTES # BLD: 24.9 %
LYMPHOCYTES ABSOLUTE: 1.5 THOU/MM3 (ref 1–4.8)
MCH RBC QN AUTO: 29.3 PG (ref 27–31)
MCHC RBC AUTO-ENTMCNC: 33.6 GM/DL (ref 33–37)
MCV RBC AUTO: 87.2 FL (ref 80–94)
MONOCYTES # BLD: 13.6 %
MONOCYTES ABSOLUTE: 0.8 THOU/MM3 (ref 0.4–1.3)
NUCLEATED RED BLOOD CELLS: 0 /100 WBC
PDW BLD-RTO: 14.8 % (ref 11.5–14.5)
PHOSPHORUS: 3.2 MG/DL (ref 2.4–4.7)
PLATELET # BLD: 214 THOU/MM3 (ref 130–400)
PMV BLD AUTO: 9.6 FL (ref 7.4–10.4)
POTASSIUM SERPL-SCNC: 3.8 MEQ/L (ref 3.5–5.2)
RBC # BLD: 4.09 MILL/MM3 (ref 4.7–6.1)
SEG NEUTROPHILS: 54.8 %
SEGMENTED NEUTROPHILS ABSOLUTE COUNT: 3.2 THOU/MM3 (ref 1.8–7.7)
SODIUM BLD-SCNC: 140 MEQ/L (ref 135–145)
WBC # BLD: 5.9 THOU/MM3 (ref 4.8–10.8)

## 2018-03-13 PROCEDURE — 99232 SBSQ HOSP IP/OBS MODERATE 35: CPT | Performed by: INTERNAL MEDICINE

## 2018-03-13 PROCEDURE — 94660 CPAP INITIATION&MGMT: CPT

## 2018-03-13 PROCEDURE — 6370000000 HC RX 637 (ALT 250 FOR IP): Performed by: HOSPITALIST

## 2018-03-13 PROCEDURE — 6360000002 HC RX W HCPCS: Performed by: HOSPITALIST

## 2018-03-13 PROCEDURE — 97110 THERAPEUTIC EXERCISES: CPT

## 2018-03-13 PROCEDURE — 6370000000 HC RX 637 (ALT 250 FOR IP): Performed by: INTERNAL MEDICINE

## 2018-03-13 PROCEDURE — 94640 AIRWAY INHALATION TREATMENT: CPT

## 2018-03-13 PROCEDURE — 85025 COMPLETE CBC W/AUTO DIFF WBC: CPT

## 2018-03-13 PROCEDURE — 1200000003 HC TELEMETRY R&B

## 2018-03-13 PROCEDURE — 82948 REAGENT STRIP/BLOOD GLUCOSE: CPT

## 2018-03-13 PROCEDURE — 97530 THERAPEUTIC ACTIVITIES: CPT

## 2018-03-13 PROCEDURE — 36415 COLL VENOUS BLD VENIPUNCTURE: CPT

## 2018-03-13 PROCEDURE — 6370000000 HC RX 637 (ALT 250 FOR IP): Performed by: NURSE PRACTITIONER

## 2018-03-13 PROCEDURE — 97535 SELF CARE MNGMENT TRAINING: CPT

## 2018-03-13 PROCEDURE — 80069 RENAL FUNCTION PANEL: CPT

## 2018-03-13 RX ORDER — INDOMETHACIN 50 MG/1
50 CAPSULE ORAL 3 TIMES DAILY PRN
Status: ON HOLD | COMMUNITY
End: 2020-04-23 | Stop reason: HOSPADM

## 2018-03-13 RX ORDER — ATORVASTATIN CALCIUM 80 MG/1
80 TABLET, FILM COATED ORAL DAILY
COMMUNITY

## 2018-03-13 RX ORDER — SERTRALINE HYDROCHLORIDE 25 MG/1
25 TABLET, FILM COATED ORAL DAILY
COMMUNITY
End: 2020-05-21

## 2018-03-13 RX ORDER — HYDROXYZINE PAMOATE 25 MG/1
25 CAPSULE ORAL NIGHTLY PRN
Status: DISCONTINUED | OUTPATIENT
Start: 2018-03-13 | End: 2018-03-14 | Stop reason: HOSPADM

## 2018-03-13 RX ORDER — ATORVASTATIN CALCIUM 80 MG/1
80 TABLET, FILM COATED ORAL DAILY
Status: DISCONTINUED | OUTPATIENT
Start: 2018-03-13 | End: 2018-03-14 | Stop reason: HOSPADM

## 2018-03-13 RX ORDER — HYDRALAZINE HYDROCHLORIDE 20 MG/ML
10 INJECTION INTRAMUSCULAR; INTRAVENOUS EVERY 6 HOURS PRN
Status: DISCONTINUED | OUTPATIENT
Start: 2018-03-13 | End: 2018-03-14 | Stop reason: HOSPADM

## 2018-03-13 RX ORDER — LISINOPRIL 20 MG/1
20 TABLET ORAL DAILY
Status: DISCONTINUED | OUTPATIENT
Start: 2018-03-13 | End: 2018-03-14 | Stop reason: HOSPADM

## 2018-03-13 RX ORDER — AMLODIPINE BESYLATE 10 MG/1
10 TABLET ORAL DAILY
Status: DISCONTINUED | OUTPATIENT
Start: 2018-03-13 | End: 2018-03-14 | Stop reason: HOSPADM

## 2018-03-13 RX ORDER — METFORMIN HYDROCHLORIDE 500 MG/1
500 TABLET, EXTENDED RELEASE ORAL 2 TIMES DAILY WITH MEALS
Status: DISCONTINUED | OUTPATIENT
Start: 2018-03-13 | End: 2018-03-14 | Stop reason: HOSPADM

## 2018-03-13 RX ORDER — FAMOTIDINE 20 MG/1
20 TABLET, FILM COATED ORAL 2 TIMES DAILY
Status: DISCONTINUED | OUTPATIENT
Start: 2018-03-13 | End: 2018-03-14 | Stop reason: HOSPADM

## 2018-03-13 RX ORDER — LISINOPRIL 20 MG/1
20 TABLET ORAL DAILY
COMMUNITY

## 2018-03-13 RX ORDER — ASPIRIN 81 MG/1
81 TABLET ORAL DAILY
Status: DISCONTINUED | OUTPATIENT
Start: 2018-03-13 | End: 2018-03-14 | Stop reason: HOSPADM

## 2018-03-13 RX ORDER — HYDROXYZINE PAMOATE 25 MG/1
25 CAPSULE ORAL NIGHTLY PRN
COMMUNITY

## 2018-03-13 RX ORDER — METFORMIN HYDROCHLORIDE 500 MG/1
500 TABLET, EXTENDED RELEASE ORAL 2 TIMES DAILY
COMMUNITY

## 2018-03-13 RX ORDER — ALOGLIPTIN 25 MG/1
25 TABLET, FILM COATED ORAL DAILY
COMMUNITY

## 2018-03-13 RX ORDER — INSULIN GLARGINE 100 [IU]/ML
40 INJECTION, SOLUTION SUBCUTANEOUS 2 TIMES DAILY
Status: DISCONTINUED | OUTPATIENT
Start: 2018-03-13 | End: 2018-03-14 | Stop reason: HOSPADM

## 2018-03-13 RX ORDER — RANITIDINE 150 MG/1
150 CAPSULE ORAL 2 TIMES DAILY
Status: ON HOLD | COMMUNITY
End: 2018-03-14 | Stop reason: HOSPADM

## 2018-03-13 RX ADMIN — ENOXAPARIN SODIUM 40 MG: 40 INJECTION SUBCUTANEOUS at 20:57

## 2018-03-13 RX ADMIN — DULOXETINE HYDROCHLORIDE 30 MG: 30 CAPSULE, DELAYED RELEASE ORAL at 08:51

## 2018-03-13 RX ADMIN — FAMOTIDINE 20 MG: 20 TABLET, FILM COATED ORAL at 20:55

## 2018-03-13 RX ADMIN — INSULIN LISPRO 3 UNITS: 100 INJECTION, SOLUTION INTRAVENOUS; SUBCUTANEOUS at 18:43

## 2018-03-13 RX ADMIN — INSULIN LISPRO 3 UNITS: 100 INJECTION, SOLUTION INTRAVENOUS; SUBCUTANEOUS at 08:52

## 2018-03-13 RX ADMIN — IPRATROPIUM BROMIDE AND ALBUTEROL SULFATE 1 AMPULE: .5; 3 SOLUTION RESPIRATORY (INHALATION) at 21:05

## 2018-03-13 RX ADMIN — INSULIN GLARGINE 40 UNITS: 100 INJECTION, SOLUTION SUBCUTANEOUS at 08:52

## 2018-03-13 RX ADMIN — INSULIN LISPRO 3 UNITS: 100 INJECTION, SOLUTION INTRAVENOUS; SUBCUTANEOUS at 20:57

## 2018-03-13 RX ADMIN — GABAPENTIN 300 MG: 300 CAPSULE ORAL at 13:25

## 2018-03-13 RX ADMIN — Medication 10 UNITS: at 12:46

## 2018-03-13 RX ADMIN — MICONAZOLE NITRATE: 2 POWDER TOPICAL at 09:04

## 2018-03-13 RX ADMIN — THIAMINE HCL (VITAMIN B1) 50 MG TABLET 100 MG: at 08:52

## 2018-03-13 RX ADMIN — INSULIN GLARGINE 40 UNITS: 100 INJECTION, SOLUTION SUBCUTANEOUS at 20:57

## 2018-03-13 RX ADMIN — GABAPENTIN 300 MG: 300 CAPSULE ORAL at 08:51

## 2018-03-13 RX ADMIN — ENOXAPARIN SODIUM 40 MG: 40 INJECTION SUBCUTANEOUS at 08:53

## 2018-03-13 RX ADMIN — METOPROLOL TARTRATE 25 MG: 25 TABLET ORAL at 20:55

## 2018-03-13 RX ADMIN — IPRATROPIUM BROMIDE AND ALBUTEROL SULFATE 1 AMPULE: .5; 3 SOLUTION RESPIRATORY (INHALATION) at 07:25

## 2018-03-13 RX ADMIN — MICONAZOLE NITRATE: 2 POWDER TOPICAL at 20:40

## 2018-03-13 RX ADMIN — INSULIN LISPRO 3 UNITS: 100 INJECTION, SOLUTION INTRAVENOUS; SUBCUTANEOUS at 12:45

## 2018-03-13 RX ADMIN — CEFUROXIME AXETIL 500 MG: 250 TABLET ORAL at 20:37

## 2018-03-13 RX ADMIN — IPRATROPIUM BROMIDE AND ALBUTEROL SULFATE 1 AMPULE: .5; 3 SOLUTION RESPIRATORY (INHALATION) at 11:50

## 2018-03-13 RX ADMIN — Medication 10 UNITS: at 08:57

## 2018-03-13 RX ADMIN — LISINOPRIL 20 MG: 20 TABLET ORAL at 20:55

## 2018-03-13 RX ADMIN — PANTOPRAZOLE SODIUM 40 MG: 40 TABLET, DELAYED RELEASE ORAL at 05:43

## 2018-03-13 RX ADMIN — CEFUROXIME AXETIL 500 MG: 250 TABLET ORAL at 09:03

## 2018-03-13 RX ADMIN — IPRATROPIUM BROMIDE AND ALBUTEROL SULFATE 1 AMPULE: .5; 3 SOLUTION RESPIRATORY (INHALATION) at 15:24

## 2018-03-13 RX ADMIN — GABAPENTIN 300 MG: 300 CAPSULE ORAL at 20:37

## 2018-03-13 ASSESSMENT — PAIN SCALES - GENERAL
PAINLEVEL_OUTOF10: 2
PAINLEVEL_OUTOF10: 0
PAINLEVEL_OUTOF10: 1
PAINLEVEL_OUTOF10: 2

## 2018-03-13 ASSESSMENT — PAIN DESCRIPTION - PAIN TYPE: TYPE: ACUTE PAIN

## 2018-03-13 ASSESSMENT — PAIN DESCRIPTION - LOCATION: LOCATION: GENERALIZED
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
## 2018-03-13 NOTE — PROGRESS NOTES
(approximate 1800 kcals/day)    Exam:  BP (!) 140/71   Pulse 84   Temp 98.7 °F (37.1 °C) (Oral)   Resp 18   Ht 5' 9\" (1.753 m)   Wt (!) 338 lb 11.2 oz (153.6 kg)   SpO2 96%   BMI 50.02 kg/m²     Gen/overall appearance: Not in acute distress. Alert. Head: Normocephalic, atraumatic  Eyes: EOMI, no scleral icterus  CVS: regular rate and rhythm, Normal S1S2  Pulm: Clear to auscultation bilaterally. No crackles/wheezes  Gastrointestinal: Soft, nontender, nondistended, no guarding or rebound  Extremities: trace edema. No erythema or warmth  Neuro: No gross focal deficits noted  Skin: Warm, dry    Labs:   Recent Labs      03/11/18   0648  03/12/18   0605  03/13/18   0541   WBC  9.1  7.9  5.9   HGB  12.1*  12.4*  12.0*   HCT  35.9*  36.9*  35.7*   PLT  203  215  214     Recent Labs      03/11/18   0648  03/12/18   0605  03/13/18   0541   NA  141  138  140   K  3.6  3.6  3.8   CL  107  104  104   CO2  22*  22*  22*   BUN  12  13  12   CREATININE  0.7  0.7  0.7   CALCIUM  8.1*  8.4*  8.1*   PHOS  1.7*  2.9  3.2     No results for input(s): AST, ALT, BILIDIR, BILITOT, ALKPHOS in the last 72 hours. No results for input(s): INR in the last 72 hours. No results for input(s): Inge Songster in the last 72 hours. Urinalysis:      Lab Results   Component Value Date    NITRU NEGATIVE 03/09/2018    WBCUA 0-2 03/09/2018    BACTERIA NONE 03/09/2018    RBCUA 15-25 03/09/2018    BLOODU MODERATE 03/09/2018    GLUCOSEU >= 1000 03/09/2018       Radiology:  Ct Abdomen Pelvis Wo Contrast Additional Contrast? None    Result Date: 3/8/2018  PROCEDURE: CT ABDOMEN PELVIS WO CONTRAST CLINICAL INFORMATION: dka  sepsis . COMPARISON: None. TECHNIQUE: 5 mm images were obtained through the abdomen and pelvis. Sagittal and coronal reconstructions were obtained and reviewed. No oral or IV contrast was administered.  All CT scans at this facility use dose modulation, iterative reconstruction, and/or weight-based dosing when appropriate to CLINICAL INFORMATION: delirium DKA. COMPARISON: None TECHNIQUE: Noncontrast 5 mm axial images were obtained through the brain. All CT scans at this facility use dose modulation, iterative reconstruction, and/or weight-based dosing when appropriate to reduce radiation dose to as low as reasonably achievable. FINDINGS: Brain: There is no acute ischemic infarct, hemorrhage, midline shift, mass, or mass effect. There is no focal abnormality of brain parenchymal attenuation. Ventricles/basal cisterns: The ventricles and cisterns are of appropriate size and configuration for the patient's age. No evidence of obstructive hydrocephalus. Skull base/calvarium: Unremarkable Scalp soft tissues: Unremarkable Intraorbital contents: Unremarkable Sinuses: There is a tiny polyp or mucosal thickening in the right maxillary sinus. Remainder of the sinuses are clear. Mastoids: Unremarkable     No acute ischemic infarct, hemorrhage, or mass effect. **This report has been created using voice recognition software. It may contain minor errors which are inherent in voice recognition technology. ** Final report electronically signed by Dr. Valley Klinefelter on 3/7/2018 11:38 PM    Ct Chest Wo Contrast    Result Date: 3/7/2018  PROCEDURE: CT CHEST WO CONTRAST CLINICAL INFORMATION: sepsis pneumonia. COMPARISON: Chest x-ray earlier today TECHNIQUE: 5 mm noncontrast axial images were obtained through the chest. Sagittal and coronal reconstructions were obtained. All CT scans at this facility use dose modulation, iterative reconstruction, and/or weight-based dosing when appropriate to reduce radiation dose to as low as reasonably achievable. FINDINGS: Limitations: Evaluation of the mediastinum and vascular structures is limited due to the lack of IV contrast. Lungs: There are low lung volumes. There is left upper lobe consolidation which may represent a combination of pneumonia and volume loss. There is lingular and bilateral lower lobe atelectasis.

## 2018-03-13 NOTE — PROGRESS NOTES
chairs. Short term goal 3: Patient will transfer bed <--> chair with SBA with least restrictive AD. Short term goal 4: Patient will tolerate a gait assessment when able. - MET  Revised Short-Term Goals:    Short term goals  Time Frame for Short term goals: 2 weeks  Short term goal 1: Patient will transfer supine <--> sit with mod I in order to get into/out of bed. Short term goal 2: Patient will transfer sit <--> stand with mod I in order to get into/out of chairs. Short term goal 3: Patient will transfer bed <--> chair with SBA with least restrictive AD. Short term goal 4: Pt to ambulate > 30 ft with RW with Supervision for household distances        Long term goals  Time Frame for Long term goals : No LTGs due to estimated length of stay          AM-PAC Inpatient Mobility without Stair Climbing Raw Score : 15  AM-PAC Inpatient without Stair Climbing T-Scale Score : 43.03  Mobility Inpatient CMS 0-100% Score: 47.43  Mobility Inpatient without Stair CMS G-Code Modifier : Rosette Ramachandran [place] [address]

## 2018-03-13 NOTE — PROGRESS NOTES
[facility] 61  INPATIENT OCCUPATIONAL THERAPY  [facility] 5K  DAILY NOTE    Time:  Time In: 8611  Time Out: 1413  Timed Code Treatment Minutes: 30 Minutes  Minutes: 30          Date: 3/13/2018  Patient Name: Mechelle Aleman,   Gender: male      Room: [facility]003-  MRN: [id number]  : 1955  (65 y.o.)  Referring Practitioner: Dr. Josh Gutierrez MD  Diagnosis: Sepsis  Additional Pertinent Hx: Pt admitted 3/7 with confusion, delirium, respiratory distress, tachypnea and dehydration. Found to be in DKA with metabolic acidosis and was intubated. He was extubated 3/8. Restrictions/Precautions:  Restrictions/Precautions: General Precautions, Fall Risk  Required Braces or Orthoses?: No       Past Medical History:   Diagnosis Date    Depression     Gout     Hyperlipidemia     Hypertension     Neuropathy (HCC)     Type II or unspecified type diabetes mellitus without mention of complication, not stated as uncontrolled      Past Surgical History:   Procedure Laterality Date    BRONCHOSCOPY N/A 3/8/2018    BRONCHOSCOPY ALVEOLAR LAVAGE performed by Marta Escobar MD at [facility]    TONSILLECTOMY             Subjective   Subjective: Pt just finishing lunch, reports he would like to go into bathroom       Pain:  Pain Assessment  Patient Currently in Pain: No (no c/o pain)       Objective  Overall Cognitive Status: WNL       ADL  Toileting: Stand by assistance          Bed mobility  Sit to Supine: Stand by assistance    Transfers  Sit to stand: Contact guard assistance  Stand to sit: Contact guard assistance  Toilet Transfers  Toilet - Technique: Ambulating  Equipment Used: Standard toilet (with 1 grab bar)  Toilet Transfer: Contact guard assistance                Functional Mobility  Functional - Mobility Device: Rolling Walker  Activity: To/from bathroom  Assist Level: Contact guard assistance     Comment: Completed BUE red theraband HEP x 10 reps each while seated EOB. Pt tolerated well. Activity Tolerance:  Activity Tolerance: Patient limited by fatigue    Assessment:     Performance deficits / Impairments: Decreased functional mobility , Decreased ADL status, Decreased endurance, Decreased strength  Prognosis: Good  Discharge Recommendations: Continue to assess pending progress, Patient would benefit from continued therapy after discharge    Patient Education:  Patient Education: OT POC; pt's goal; UE exercises with theraband    Equipment Recommendations: Other: Will continued to monitor    Safety:  Safety Devices in place: Yes  Type of devices: Gait belt, Patient at risk for falls, Left in chair, Nurse notified, Chair alarm in place, Call light within reach    Plan:  Times per week: 3-5x  Current Treatment Recommendations: Strengthening, Functional Mobility Training, Endurance Training, Self-Care / ADL, Safety Education & Training  Plan Comment: Pt would benefit from continued OT services when medically stable and discharged from Acute. Specific instructions for Next Treatment: Functional mobility as able; lower body ADLs and Upper body HEP    Goals:  Patient goals : \"I want to get stronger and be able to return to being more active at home. \" pt states. Short term goals  Time Frame for Short term goals: 2 weeks  Short term goal 1: Pt will demonstrate functional transfers with OTR to prepare for doing self care while out of bed. MET, REVISE  Short term goal 2: Pt will complete lower body ADLs while using any AE needed with SBA to increase his independence with self care. NOT MET, CONTINUE  Short term goal 3: Pt will complete BUE red theraband exercises x 5 sets of 8-10 reps each to increase his strength and endurance for ease of doing a shower.  NOT MET, CONTINUE  Long term goals  Time Frame for Long term

## 2018-03-13 NOTE — PLAN OF CARE
Problem: Infection:  Goal: Will remain free from infection  Will remain free from infection   Outcome: Completed Date Met: 03/13/18  Pt removed from Contact isolation 3/12 due to clearing of infection from lab draws at this time. Problem: Safety:  Goal: Free from accidental physical injury  Free from accidental physical injury   Outcome: Ongoing  Pt free from accidental injury so far this shift. Pt alerts staff to needs with ambulation and is non-impulsive. Will continue to monitor. Problem: Daily Care:  Goal: Daily care needs are met  Daily care needs are met   Outcome: Ongoing  Pt able to perform most ADL's with very minimal assistance from staff, will continue to monitor and help as needed. Pt denies any new needs at this time. Problem: Pain:  Goal: Patient's pain/discomfort is manageable  Patient's pain/discomfort is manageable   Outcome: Ongoing  Pt has denied any new pain throughout shift so far. Will continue to monitor. Problem: Skin Integrity:  Goal: Skin integrity will stabilize  Skin integrity will stabilize   Outcome: Ongoing  Pt has some scattered abrasions on bilateral legs and bruising scattered t/o (predominately on RLQ). Will continue to monitor with Stage 2 on coccyx/buttocks region. No new breakdown seen this shift per skin assessment. Problem: Discharge Planning:  Goal: Patients continuum of care needs are met  Patients continuum of care needs are met   Outcome: Ongoing  Pt planning d/c to home with brother and girlfriend at this time. Was alerted of water being re-established to bathroom and kitchen per pt this shift. Problem: Nutrition  Goal: Optimal nutrition therapy  Outcome: Completed Date Met: 03/13/18  Pt currently on carb control diet with healthy choices noted for snacking during this evening. Blood sugar appears to be stabilizing with pt also. Problem: Falls - Risk of  Goal: Absence of falls  Outcome: Ongoing  Pt free from falls so far this shift.   Has
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
## 2018-03-14 VITALS
WEIGHT: 315 LBS | DIASTOLIC BLOOD PRESSURE: 78 MMHG | RESPIRATION RATE: 18 BRPM | TEMPERATURE: 98.9 F | BODY MASS INDEX: 46.65 KG/M2 | HEART RATE: 74 BPM | OXYGEN SATURATION: 96 % | SYSTOLIC BLOOD PRESSURE: 175 MMHG | HEIGHT: 69 IN

## 2018-03-14 LAB
ALBUMIN SERPL-MCNC: 2.8 G/DL (ref 3.5–5.1)
ANION GAP SERPL CALCULATED.3IONS-SCNC: 14 MEQ/L (ref 8–16)
ANISOCYTOSIS: ABNORMAL
BASOPHILS # BLD: 0.4 %
BASOPHILS ABSOLUTE: 0 THOU/MM3 (ref 0–0.1)
BUN BLDV-MCNC: 9 MG/DL (ref 7–22)
CALCIUM SERPL-MCNC: 8.4 MG/DL (ref 8.5–10.5)
CHLORIDE BLD-SCNC: 101 MEQ/L (ref 98–111)
CO2: 25 MEQ/L (ref 23–33)
CREAT SERPL-MCNC: 0.7 MG/DL (ref 0.4–1.2)
EOSINOPHIL # BLD: 4.1 %
EOSINOPHILS ABSOLUTE: 0.2 THOU/MM3 (ref 0–0.4)
GFR SERPL CREATININE-BSD FRML MDRD: > 90 ML/MIN/1.73M2
GLUCOSE BLD-MCNC: 180 MG/DL (ref 70–108)
GLUCOSE BLD-MCNC: 196 MG/DL (ref 70–108)
GLUCOSE BLD-MCNC: 249 MG/DL (ref 70–108)
HCT VFR BLD CALC: 38.2 % (ref 42–52)
HEMOGLOBIN: 12.6 GM/DL (ref 14–18)
LYMPHOCYTES # BLD: 27.8 %
LYMPHOCYTES ABSOLUTE: 1.7 THOU/MM3 (ref 1–4.8)
MCH RBC QN AUTO: 28.8 PG (ref 27–31)
MCHC RBC AUTO-ENTMCNC: 33 GM/DL (ref 33–37)
MCV RBC AUTO: 87.2 FL (ref 80–94)
MONOCYTES # BLD: 16.1 %
MONOCYTES ABSOLUTE: 1 THOU/MM3 (ref 0.4–1.3)
NUCLEATED RED BLOOD CELLS: 0 /100 WBC
PDW BLD-RTO: 15.1 % (ref 11.5–14.5)
PHOSPHORUS: 3.4 MG/DL (ref 2.4–4.7)
PLATELET # BLD: 231 THOU/MM3 (ref 130–400)
PMV BLD AUTO: 9.4 FL (ref 7.4–10.4)
POTASSIUM SERPL-SCNC: 3.9 MEQ/L (ref 3.5–5.2)
RBC # BLD: 4.38 MILL/MM3 (ref 4.7–6.1)
SEG NEUTROPHILS: 51.6 %
SEGMENTED NEUTROPHILS ABSOLUTE COUNT: 3.1 THOU/MM3 (ref 1.8–7.7)
SODIUM BLD-SCNC: 140 MEQ/L (ref 135–145)
WBC # BLD: 6 THOU/MM3 (ref 4.8–10.8)

## 2018-03-14 PROCEDURE — 6370000000 HC RX 637 (ALT 250 FOR IP): Performed by: INTERNAL MEDICINE

## 2018-03-14 PROCEDURE — 36415 COLL VENOUS BLD VENIPUNCTURE: CPT

## 2018-03-14 PROCEDURE — 97110 THERAPEUTIC EXERCISES: CPT

## 2018-03-14 PROCEDURE — 82948 REAGENT STRIP/BLOOD GLUCOSE: CPT

## 2018-03-14 PROCEDURE — 99239 HOSP IP/OBS DSCHRG MGMT >30: CPT | Performed by: INTERNAL MEDICINE

## 2018-03-14 PROCEDURE — 99232 SBSQ HOSP IP/OBS MODERATE 35: CPT | Performed by: INTERNAL MEDICINE

## 2018-03-14 PROCEDURE — 6370000000 HC RX 637 (ALT 250 FOR IP): Performed by: NURSE PRACTITIONER

## 2018-03-14 PROCEDURE — 94640 AIRWAY INHALATION TREATMENT: CPT

## 2018-03-14 PROCEDURE — 6360000002 HC RX W HCPCS: Performed by: HOSPITALIST

## 2018-03-14 PROCEDURE — 85025 COMPLETE CBC W/AUTO DIFF WBC: CPT

## 2018-03-14 PROCEDURE — 97116 GAIT TRAINING THERAPY: CPT

## 2018-03-14 PROCEDURE — 6370000000 HC RX 637 (ALT 250 FOR IP): Performed by: HOSPITALIST

## 2018-03-14 PROCEDURE — 2500000003 HC RX 250 WO HCPCS: Performed by: INTERNAL MEDICINE

## 2018-03-14 PROCEDURE — 80069 RENAL FUNCTION PANEL: CPT

## 2018-03-14 RX ADMIN — ATORVASTATIN CALCIUM 80 MG: 80 TABLET, FILM COATED ORAL at 08:51

## 2018-03-14 RX ADMIN — THIAMINE HCL (VITAMIN B1) 50 MG TABLET 100 MG: at 08:56

## 2018-03-14 RX ADMIN — AMLODIPINE BESYLATE 10 MG: 10 TABLET ORAL at 08:51

## 2018-03-14 RX ADMIN — ASPIRIN 81 MG: 81 TABLET, COATED ORAL at 08:51

## 2018-03-14 RX ADMIN — LISINOPRIL 20 MG: 20 TABLET ORAL at 08:53

## 2018-03-14 RX ADMIN — GABAPENTIN 300 MG: 300 CAPSULE ORAL at 13:14

## 2018-03-14 RX ADMIN — SERTRALINE 25 MG: 50 TABLET, FILM COATED ORAL at 08:56

## 2018-03-14 RX ADMIN — INSULIN GLARGINE 40 UNITS: 100 INJECTION, SOLUTION SUBCUTANEOUS at 09:07

## 2018-03-14 RX ADMIN — METFORMIN HYDROCHLORIDE 500 MG: 500 TABLET, EXTENDED RELEASE ORAL at 08:52

## 2018-03-14 RX ADMIN — INSULIN LISPRO 3 UNITS: 100 INJECTION, SOLUTION INTRAVENOUS; SUBCUTANEOUS at 03:00

## 2018-03-14 RX ADMIN — LINAGLIPTIN 5 MG: 5 TABLET, FILM COATED ORAL at 08:51

## 2018-03-14 RX ADMIN — MICONAZOLE NITRATE: 2 POWDER TOPICAL at 08:55

## 2018-03-14 RX ADMIN — FAMOTIDINE 20 MG: 20 TABLET, FILM COATED ORAL at 08:51

## 2018-03-14 RX ADMIN — GABAPENTIN 300 MG: 300 CAPSULE ORAL at 08:51

## 2018-03-14 RX ADMIN — IPRATROPIUM BROMIDE AND ALBUTEROL SULFATE 1 AMPULE: .5; 3 SOLUTION RESPIRATORY (INHALATION) at 12:57

## 2018-03-14 RX ADMIN — ENOXAPARIN SODIUM 40 MG: 40 INJECTION SUBCUTANEOUS at 08:57

## 2018-03-14 RX ADMIN — METOPROLOL TARTRATE 25 MG: 25 TABLET ORAL at 08:51

## 2018-03-14 RX ADMIN — IPRATROPIUM BROMIDE AND ALBUTEROL SULFATE 1 AMPULE: .5; 3 SOLUTION RESPIRATORY (INHALATION) at 09:41

## 2018-03-14 RX ADMIN — CEFUROXIME AXETIL 500 MG: 250 TABLET ORAL at 08:51

## 2018-03-14 RX ADMIN — INSULIN LISPRO 6 UNITS: 100 INJECTION, SOLUTION INTRAVENOUS; SUBCUTANEOUS at 13:15

## 2018-03-14 ASSESSMENT — PAIN SCALES - GENERAL
PAINLEVEL_OUTOF10: 0
PAINLEVEL_OUTOF10: 0

## 2018-03-14 NOTE — CARE COORDINATION
3/14/18, 2:39 PM    DISCHARGE BARRIERS    Referral completed to [facility]) for PT/OT and possibly nursing-RN Tang Pratt has sent a message to Dr. Ary Ortiz to see if this could be added to home health order for medication monitoring and education. Primary RN Earle Maloney has advised that patient will also need transportation home. Spoke with patient and advised him that home health referral has been completed-name and number of agency provided to patient. SW also verified address for transport home. Cab transport form completed and provided to [employer] along with number for [facility] to arrange [facility] when discharge is completed. No other needs identified. 3/14/18, 3:04 PM    Discharge plan discussed by  and . Discharge plan reviewed with patient/ family. Patient/ family verbalize understanding of discharge plan and are in agreement with plan. Understanding was demonstrated using the teach back method.    Services After Discharge  Services At/After Discharge: PT, OT, Nursing Services, Transport, In cab ([facility])

## 2018-03-14 NOTE — PROGRESS NOTES
to doing them with his brother) from memory;  AP, LAQ, marches; reclined hip ABD/ADD, glute sets, and quad sets all x 10-20 reps each for improved endurance with gait training          Activity Tolerance:  Activity Tolerance: Patient Tolerated treatment well;Patient limited by endurance    Assessment: Body structures, Functions, Activity limitations: Decreased functional mobility , Decreased endurance, Decreased balance, Decreased sensation, Decreased strength  Assessment: recommend further skilled therapy to improve gait endurance   Prognosis: Good  REQUIRES PT FOLLOW UP: Yes  Discharge Recommendations: Continue to assess pending progress, Subacute/Skilled Nursing Facility, Patient would benefit from continued therapy after discharge    Patient Education:  Patient Education: posture     Equipment Recommendations:  Equipment Needed: No  Other: monitor needs    Safety:  Type of devices: All fall risk precautions in place, Left in chair, Call light within reach, Nurse notified    Plan:  Times per week: 5x GM  Times per day: Daily  Current Treatment Recommendations: Strengthening, Balance Training, Functional Mobility Training, Transfer Training, Gait Training, Equipment Evaluation, Education, & procurement, Patient/Caregiver Education & Training, Safety Education & Training    Goals:  Patient goals : Get stronger    Short term goals  Time Frame for Short term goals: 2 weeks  Short term goal 1: Patient will transfer supine <--> sit with mod I in order to get into/out of bed. Short term goal 2: Patient will transfer sit <--> stand with mod I in order to get into/out of chairs. Short term goal 3: Patient will transfer bed <--> chair with SBA with least restrictive AD.   Short term goal 4: Pt to ambulate > 30 ft with RW with Supervision for household distances    Long term goals  Time Frame for Long term goals : No LTGs due to estimated length of stay
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
## 2018-03-14 NOTE — DISCHARGE SUMMARY
Hospital Medicine Discharge Summary      Patient Identification:   Mercedez Rivas   : 1955  MRN: [id number]   Account: [de-identified]      Patient's PCP: Frantz Landeros CNP    Admit Date: 3/7/2018     Discharge Date: 3/14    Admitting Physician: Donnie Butler MD     Discharge Physician: Yuliana Latif MD     Discharge Diagnoses and hospital course: Active Hospital Problems    Diagnosis Date Noted    Sepsis ([address] [A41.9] 2018     Priority: High     Class: Acute    Community acquired pneumonia [J18.9] 2018     Priority: High     Class: Acute    Diabetic ketoacidosis without coma associated with type 1 diabetes mellitus ([address] [E10.10]     Acute respiratory failure with hypoxemia ([address] [J96.01]     Diabetic ketoacidosis without coma associated with type 2 diabetes mellitus ([address] [E13.10] 2018     Class: Acute     1. Diabetic ketoacidosis sec to non adherence and Pneumonia. Transitioned off insulin GTT upon closure of AG to sq insulin with reasonable control of BS. Clinically resolved. 2. Acute kidney injury, secondary to #1.  Improved. 3. Hypovolemia, secondary to #1  4. Community-acquired pneumonia, status post bronchoscopy. S/p 7 day course of abx.  5. Acute hypoxic respiratory failure, successfully extubated 3/8/2018  6. Hypertensive urgency      comorbidities:  · Morbid obesity Body mass index is 53.16 kg/m². · None adherence  · Diabetes mellitus type 2 with neuropathy  · Essential hypertension  · Dyslipidemia  · GERD  · Depression     The patient was seen and examined on day of discharge and this discharge summary is in conjunction with any daily progress note from day of discharge.     Exam:     Vitals:  Vitals:    18 2315 18 0515 18 0724   BP: (!) 162/78 (!) 143/75 131/60 (!) 175/78   Pulse: 82 81 73 74   Resp: 20  16 18   Temp: 98.8 °F (37.1 °C) 99.3 °F (37.4 °C) 98.7 °F (37.1 °C) 98.9 °F (37.2 °C)   TempSrc: Oral Oral

## 2019-01-08 ENCOUNTER — HOSPITAL ENCOUNTER (OUTPATIENT)
Age: 64
Setting detail: SPECIMEN
Discharge: HOME | End: 2019-01-08
Payer: COMMERCIAL

## 2019-01-13 LAB
CULTURE: ABNORMAL
CULTURE: ABNORMAL
DIRECT EXAM: ABNORMAL
DIRECT EXAM: ABNORMAL
Lab: ABNORMAL
SPECIMEN DESCRIPTION: ABNORMAL
STATUS: ABNORMAL

## 2020-04-17 ENCOUNTER — HOSPITAL ENCOUNTER (INPATIENT)
Age: 65
LOS: 6 days | Discharge: HOME HEALTH | DRG: 663 | End: 2020-04-23
Attending: EMERGENCY MEDICINE | Admitting: INTERNAL MEDICINE
Payer: COMMERCIAL

## 2020-04-17 ENCOUNTER — APPOINTMENT (OUTPATIENT)
Dept: GENERAL RADIOLOGY | Age: 65
DRG: 663 | End: 2020-04-17
Payer: COMMERCIAL

## 2020-04-17 PROBLEM — D64.9 ANEMIA: Status: ACTIVE | Noted: 2020-04-17

## 2020-04-17 PROBLEM — D64.9 ACUTE ANEMIA: Status: ACTIVE | Noted: 2020-04-17

## 2020-04-17 LAB
ABO: NORMAL
ABSOLUTE RETIC #: 76 THOU/MM3 (ref 20–115)
ALBUMIN SERPL-MCNC: 3.8 G/DL (ref 3.5–5.1)
ALP BLD-CCNC: 62 U/L (ref 38–126)
ALT SERPL-CCNC: 6 U/L (ref 11–66)
ANION GAP SERPL CALCULATED.3IONS-SCNC: 15 MEQ/L (ref 8–16)
ANTIBODY SCREEN: NORMAL
AST SERPL-CCNC: 14 U/L (ref 5–40)
AVERAGE GLUCOSE: 123 MG/DL (ref 70–126)
BASOPHILIA: ABNORMAL
BASOPHILS # BLD: 1.5 %
BASOPHILS ABSOLUTE: 0.1 THOU/MM3 (ref 0–0.1)
BILIRUB SERPL-MCNC: 0.5 MG/DL (ref 0.3–1.2)
BUN BLDV-MCNC: 15 MG/DL (ref 7–22)
CALCIUM SERPL-MCNC: 8.8 MG/DL (ref 8.5–10.5)
CHLORIDE BLD-SCNC: 105 MEQ/L (ref 98–111)
CO2: 19 MEQ/L (ref 23–33)
CREAT SERPL-MCNC: 1 MG/DL (ref 0.4–1.2)
DIFFERENTIAL TYPE: ABNORMAL
EOSINOPHIL # BLD: 1.6 %
EOSINOPHILS ABSOLUTE: 0.1 THOU/MM3 (ref 0–0.4)
ERYTHROCYTE [DISTWIDTH] IN BLOOD BY AUTOMATED COUNT: 20.1 % (ref 11.5–14.5)
ERYTHROCYTE [DISTWIDTH] IN BLOOD BY AUTOMATED COUNT: 47.5 FL (ref 35–45)
FERRITIN: 10 NG/ML (ref 22–322)
FOLATE: 8.5 NG/ML (ref 4.8–24.2)
GFR SERPL CREATININE-BSD FRML MDRD: 75 ML/MIN/1.73M2
GLUCOSE BLD-MCNC: 197 MG/DL (ref 70–108)
GLUCOSE BLD-MCNC: 203 MG/DL (ref 70–108)
HBA1C MFR BLD: 6.1 % (ref 4.4–6.4)
HCT VFR BLD CALC: 26.4 % (ref 42–52)
HCT VFR BLD CALC: 27.3 % (ref 42–52)
HEMOCCULT STL QL: NEGATIVE
HEMOGLOBIN: 6.6 GM/DL (ref 14–18)
HEMOGLOBIN: 6.7 GM/DL (ref 14–18)
HYPOCHROMIA: PRESENT
IMMATURE GRANS (ABS): 0.03 THOU/MM3 (ref 0–0.07)
IMMATURE GRANULOCYTES: 0.5 %
IMMATURE RETIC FRACT: 35.6 % (ref 2.3–13.4)
IRON: 11 UG/DL (ref 65–195)
LYMPHOCYTES # BLD: 13.7 %
LYMPHOCYTES ABSOLUTE: 0.8 THOU/MM3 (ref 1–4.8)
MAGNESIUM: 2.1 MG/DL (ref 1.6–2.4)
MCH RBC QN AUTO: 16.3 PG (ref 26–33)
MCHC RBC AUTO-ENTMCNC: 24.2 GM/DL (ref 32.2–35.5)
MCV RBC AUTO: 67.2 FL (ref 80–94)
MICROCYTES: PRESENT
MONOCYTES # BLD: 9.7 %
MONOCYTES ABSOLUTE: 0.6 THOU/MM3 (ref 0.4–1.3)
NUCLEATED RED BLOOD CELLS: 0 /100 WBC
OSMOLALITY CALCULATION: 283.8 MOSMOL/KG (ref 275–300)
PATHOLOGIST REVIEW: ABNORMAL
PLATELET # BLD: 234 THOU/MM3 (ref 130–400)
PLATELET ESTIMATE: ADEQUATE
PMV BLD AUTO: 10.4 FL (ref 9.4–12.4)
POIKILOCYTES: ABNORMAL
POTASSIUM SERPL-SCNC: 4.4 MEQ/L (ref 3.5–5.2)
PRO-BNP: 2784 PG/ML (ref 0–900)
PROCALCITONIN: 0.08 NG/ML (ref 0.01–0.09)
RBC # BLD: 4.06 MILL/MM3 (ref 4.7–6.1)
RETIC HEMOGLOBIN: 16.3 PG (ref 28.2–35.7)
RETICULOCYTE ABSOLUTE COUNT: 1.9 % (ref 0.5–2)
RH FACTOR: NORMAL
SARS-COV-2, NAAT: NOT DETECTED
SCAN OF BLOOD SMEAR: NORMAL
SEG NEUTROPHILS: 73 %
SEGMENTED NEUTROPHILS ABSOLUTE COUNT: 4.5 THOU/MM3 (ref 1.8–7.7)
SODIUM BLD-SCNC: 139 MEQ/L (ref 135–145)
TOTAL IRON BINDING CAPACITY: 319 UG/DL (ref 171–450)
TOTAL PROTEIN: 6.7 G/DL (ref 6.1–8)
TROPONIN T: < 0.01 NG/ML
TSH SERPL DL<=0.05 MIU/L-ACNC: 4.82 UIU/ML (ref 0.4–4.2)
VITAMIN B-12: 553 PG/ML (ref 211–911)
WBC # BLD: 6.1 THOU/MM3 (ref 4.8–10.8)

## 2020-04-17 PROCEDURE — 86901 BLOOD TYPING SEROLOGIC RH(D): CPT

## 2020-04-17 PROCEDURE — 2580000003 HC RX 258: Performed by: INTERNAL MEDICINE

## 2020-04-17 PROCEDURE — 80053 COMPREHEN METABOLIC PANEL: CPT

## 2020-04-17 PROCEDURE — 86923 COMPATIBILITY TEST ELECTRIC: CPT

## 2020-04-17 PROCEDURE — 99284 EMERGENCY DEPT VISIT MOD MDM: CPT

## 2020-04-17 PROCEDURE — 2140000000 HC CCU INTERMEDIATE R&B

## 2020-04-17 PROCEDURE — 82746 ASSAY OF FOLIC ACID SERUM: CPT

## 2020-04-17 PROCEDURE — 36430 TRANSFUSION BLD/BLD COMPNT: CPT

## 2020-04-17 PROCEDURE — 99223 1ST HOSP IP/OBS HIGH 75: CPT | Performed by: FAMILY MEDICINE

## 2020-04-17 PROCEDURE — P9016 RBC LEUKOCYTES REDUCED: HCPCS

## 2020-04-17 PROCEDURE — 6370000000 HC RX 637 (ALT 250 FOR IP): Performed by: INTERNAL MEDICINE

## 2020-04-17 PROCEDURE — 84443 ASSAY THYROID STIM HORMONE: CPT

## 2020-04-17 PROCEDURE — 86900 BLOOD TYPING SEROLOGIC ABO: CPT

## 2020-04-17 PROCEDURE — 83735 ASSAY OF MAGNESIUM: CPT

## 2020-04-17 PROCEDURE — 6360000002 HC RX W HCPCS: Performed by: INTERNAL MEDICINE

## 2020-04-17 PROCEDURE — 86850 RBC ANTIBODY SCREEN: CPT

## 2020-04-17 PROCEDURE — 85046 RETICYTE/HGB CONCENTRATE: CPT

## 2020-04-17 PROCEDURE — U0002 COVID-19 LAB TEST NON-CDC: HCPCS

## 2020-04-17 PROCEDURE — 82948 REAGENT STRIP/BLOOD GLUCOSE: CPT

## 2020-04-17 PROCEDURE — 85018 HEMOGLOBIN: CPT

## 2020-04-17 PROCEDURE — 82728 ASSAY OF FERRITIN: CPT

## 2020-04-17 PROCEDURE — 36415 COLL VENOUS BLD VENIPUNCTURE: CPT

## 2020-04-17 PROCEDURE — 83880 ASSAY OF NATRIURETIC PEPTIDE: CPT

## 2020-04-17 PROCEDURE — 84145 PROCALCITONIN (PCT): CPT

## 2020-04-17 PROCEDURE — 85014 HEMATOCRIT: CPT

## 2020-04-17 PROCEDURE — 83550 IRON BINDING TEST: CPT

## 2020-04-17 PROCEDURE — C9113 INJ PANTOPRAZOLE SODIUM, VIA: HCPCS | Performed by: INTERNAL MEDICINE

## 2020-04-17 PROCEDURE — 82607 VITAMIN B-12: CPT

## 2020-04-17 PROCEDURE — 82272 OCCULT BLD FECES 1-3 TESTS: CPT

## 2020-04-17 PROCEDURE — 85025 COMPLETE CBC W/AUTO DIFF WBC: CPT

## 2020-04-17 PROCEDURE — 71045 X-RAY EXAM CHEST 1 VIEW: CPT

## 2020-04-17 PROCEDURE — 83540 ASSAY OF IRON: CPT

## 2020-04-17 PROCEDURE — 83036 HEMOGLOBIN GLYCOSYLATED A1C: CPT

## 2020-04-17 PROCEDURE — 84238 ASSAY NONENDOCRINE RECEPTOR: CPT

## 2020-04-17 PROCEDURE — 84484 ASSAY OF TROPONIN QUANT: CPT

## 2020-04-17 RX ORDER — DOXYCYCLINE HYCLATE 100 MG
100 TABLET ORAL EVERY 12 HOURS SCHEDULED
Status: DISCONTINUED | OUTPATIENT
Start: 2020-04-17 | End: 2020-04-18

## 2020-04-17 RX ORDER — NICOTINE POLACRILEX 4 MG
15 LOZENGE BUCCAL PRN
Status: DISCONTINUED | OUTPATIENT
Start: 2020-04-17 | End: 2020-04-23 | Stop reason: HOSPADM

## 2020-04-17 RX ORDER — 0.9 % SODIUM CHLORIDE 0.9 %
20 INTRAVENOUS SOLUTION INTRAVENOUS ONCE
Status: COMPLETED | OUTPATIENT
Start: 2020-04-17 | End: 2020-04-17

## 2020-04-17 RX ORDER — AMLODIPINE BESYLATE 10 MG/1
10 TABLET ORAL DAILY
Status: DISCONTINUED | OUTPATIENT
Start: 2020-04-17 | End: 2020-04-23 | Stop reason: HOSPADM

## 2020-04-17 RX ORDER — ATORVASTATIN CALCIUM 80 MG/1
80 TABLET, FILM COATED ORAL DAILY
Status: DISCONTINUED | OUTPATIENT
Start: 2020-04-17 | End: 2020-04-23 | Stop reason: HOSPADM

## 2020-04-17 RX ORDER — PANTOPRAZOLE SODIUM 40 MG/10ML
40 INJECTION, POWDER, LYOPHILIZED, FOR SOLUTION INTRAVENOUS 2 TIMES DAILY
Status: DISCONTINUED | OUTPATIENT
Start: 2020-04-17 | End: 2020-04-20

## 2020-04-17 RX ORDER — DEXTROSE MONOHYDRATE 25 G/50ML
12.5 INJECTION, SOLUTION INTRAVENOUS PRN
Status: DISCONTINUED | OUTPATIENT
Start: 2020-04-17 | End: 2020-04-23 | Stop reason: HOSPADM

## 2020-04-17 RX ORDER — GABAPENTIN 300 MG/1
300 CAPSULE ORAL 3 TIMES DAILY
Status: DISCONTINUED | OUTPATIENT
Start: 2020-04-17 | End: 2020-04-23 | Stop reason: HOSPADM

## 2020-04-17 RX ORDER — LISINOPRIL 20 MG/1
20 TABLET ORAL DAILY
Status: DISCONTINUED | OUTPATIENT
Start: 2020-04-17 | End: 2020-04-23 | Stop reason: HOSPADM

## 2020-04-17 RX ORDER — DEXTROSE MONOHYDRATE 50 MG/ML
100 INJECTION, SOLUTION INTRAVENOUS PRN
Status: DISCONTINUED | OUTPATIENT
Start: 2020-04-17 | End: 2020-04-23 | Stop reason: HOSPADM

## 2020-04-17 RX ADMIN — SERTRALINE 25 MG: 50 TABLET, FILM COATED ORAL at 19:46

## 2020-04-17 RX ADMIN — METOPROLOL TARTRATE 25 MG: 25 TABLET ORAL at 19:46

## 2020-04-17 RX ADMIN — GABAPENTIN 300 MG: 300 CAPSULE ORAL at 19:45

## 2020-04-17 RX ADMIN — AMLODIPINE BESYLATE 10 MG: 10 TABLET ORAL at 19:44

## 2020-04-17 RX ADMIN — LISINOPRIL 20 MG: 20 TABLET ORAL at 19:46

## 2020-04-17 RX ADMIN — CEFTRIAXONE SODIUM 1 G: 1 INJECTION, POWDER, FOR SOLUTION INTRAMUSCULAR; INTRAVENOUS at 19:46

## 2020-04-17 RX ADMIN — ATORVASTATIN CALCIUM 80 MG: 80 TABLET, FILM COATED ORAL at 19:44

## 2020-04-17 RX ADMIN — PANTOPRAZOLE SODIUM 40 MG: 40 INJECTION, POWDER, FOR SOLUTION INTRAVENOUS at 20:10

## 2020-04-17 RX ADMIN — DOXYCYCLINE HYCLATE 100 MG: 100 TABLET, COATED ORAL at 19:45

## 2020-04-17 RX ADMIN — SODIUM CHLORIDE 20 ML: 9 INJECTION, SOLUTION INTRAVENOUS at 19:57

## 2020-04-17 RX ADMIN — IRON SUCROSE 200 MG: 20 INJECTION, SOLUTION INTRAVENOUS at 17:21

## 2020-04-17 ASSESSMENT — PAIN DESCRIPTION - PAIN TYPE: TYPE: ACUTE PAIN

## 2020-04-17 ASSESSMENT — PAIN SCALES - GENERAL
PAINLEVEL_OUTOF10: 6
PAINLEVEL_OUTOF10: 0
PAINLEVEL_OUTOF10: 6
PAINLEVEL_OUTOF10: 0

## 2020-04-17 ASSESSMENT — PAIN DESCRIPTION - FREQUENCY: FREQUENCY: CONTINUOUS

## 2020-04-17 ASSESSMENT — ENCOUNTER SYMPTOMS
VOICE CHANGE: 0
TROUBLE SWALLOWING: 0
SHORTNESS OF BREATH: 1
VOMITING: 0
WHEEZING: 0
RHINORRHEA: 0
COUGH: 0
SINUS PRESSURE: 0
DIARRHEA: 0
CHEST TIGHTNESS: 0
CONSTIPATION: 0
NAUSEA: 0
SORE THROAT: 0
BACK PAIN: 0
ABDOMINAL PAIN: 0

## 2020-04-17 ASSESSMENT — PAIN DESCRIPTION - LOCATION: LOCATION: SCROTUM

## 2020-04-17 ASSESSMENT — PAIN DESCRIPTION - DESCRIPTORS: DESCRIPTORS: ACHING

## 2020-04-18 PROBLEM — D50.9 MICROCYTIC ANEMIA: Status: ACTIVE | Noted: 2020-04-17

## 2020-04-18 LAB
GLUCOSE BLD-MCNC: 147 MG/DL (ref 70–108)
GLUCOSE BLD-MCNC: 154 MG/DL (ref 70–108)
GLUCOSE BLD-MCNC: 209 MG/DL (ref 70–108)
GLUCOSE BLD-MCNC: 214 MG/DL (ref 70–108)
GLUCOSE BLD-MCNC: 219 MG/DL (ref 70–108)
HCT VFR BLD CALC: 29.1 % (ref 42–52)
HCT VFR BLD CALC: 30.7 % (ref 42–52)
HEMOGLOBIN: 7.5 GM/DL (ref 14–18)
HEMOGLOBIN: 7.9 GM/DL (ref 14–18)
LEGIONELLA PNEUMOPHILIA AG, URINE: NEGATIVE
STREP PNEUMO AG, UR: NEGATIVE

## 2020-04-18 PROCEDURE — 6370000000 HC RX 637 (ALT 250 FOR IP): Performed by: FAMILY MEDICINE

## 2020-04-18 PROCEDURE — 87899 AGENT NOS ASSAY W/OPTIC: CPT

## 2020-04-18 PROCEDURE — 6370000000 HC RX 637 (ALT 250 FOR IP): Performed by: INTERNAL MEDICINE

## 2020-04-18 PROCEDURE — C9113 INJ PANTOPRAZOLE SODIUM, VIA: HCPCS | Performed by: INTERNAL MEDICINE

## 2020-04-18 PROCEDURE — 6360000002 HC RX W HCPCS: Performed by: INTERNAL MEDICINE

## 2020-04-18 PROCEDURE — 87040 BLOOD CULTURE FOR BACTERIA: CPT

## 2020-04-18 PROCEDURE — 87449 NOS EACH ORGANISM AG IA: CPT

## 2020-04-18 PROCEDURE — 6360000002 HC RX W HCPCS: Performed by: FAMILY MEDICINE

## 2020-04-18 PROCEDURE — 85018 HEMOGLOBIN: CPT

## 2020-04-18 PROCEDURE — 82948 REAGENT STRIP/BLOOD GLUCOSE: CPT

## 2020-04-18 PROCEDURE — 36415 COLL VENOUS BLD VENIPUNCTURE: CPT

## 2020-04-18 PROCEDURE — 2580000003 HC RX 258: Performed by: FAMILY MEDICINE

## 2020-04-18 PROCEDURE — 2580000003 HC RX 258: Performed by: INTERNAL MEDICINE

## 2020-04-18 PROCEDURE — 85014 HEMATOCRIT: CPT

## 2020-04-18 PROCEDURE — 99233 SBSQ HOSP IP/OBS HIGH 50: CPT | Performed by: INTERNAL MEDICINE

## 2020-04-18 PROCEDURE — 2140000000 HC CCU INTERMEDIATE R&B

## 2020-04-18 RX ORDER — AZITHROMYCIN 250 MG/1
500 TABLET, FILM COATED ORAL DAILY
Status: DISCONTINUED | OUTPATIENT
Start: 2020-04-18 | End: 2020-04-20

## 2020-04-18 RX ORDER — FUROSEMIDE 10 MG/ML
20 INJECTION INTRAMUSCULAR; INTRAVENOUS ONCE
Status: DISCONTINUED | OUTPATIENT
Start: 2020-04-18 | End: 2020-04-18

## 2020-04-18 RX ORDER — FUROSEMIDE 10 MG/ML
40 INJECTION INTRAMUSCULAR; INTRAVENOUS ONCE
Status: COMPLETED | OUTPATIENT
Start: 2020-04-18 | End: 2020-04-18

## 2020-04-18 RX ORDER — POTASSIUM CHLORIDE 20 MEQ/1
40 TABLET, EXTENDED RELEASE ORAL ONCE
Status: COMPLETED | OUTPATIENT
Start: 2020-04-18 | End: 2020-04-18

## 2020-04-18 RX ORDER — FERROUS SULFATE 325(65) MG
325 TABLET ORAL 2 TIMES DAILY WITH MEALS
Status: DISCONTINUED | OUTPATIENT
Start: 2020-04-18 | End: 2020-04-23 | Stop reason: HOSPADM

## 2020-04-18 RX ORDER — INSULIN GLARGINE 100 [IU]/ML
20 INJECTION, SOLUTION SUBCUTANEOUS 2 TIMES DAILY
Status: DISCONTINUED | OUTPATIENT
Start: 2020-04-18 | End: 2020-04-23 | Stop reason: HOSPADM

## 2020-04-18 RX ORDER — FUROSEMIDE 10 MG/ML
10 INJECTION INTRAMUSCULAR; INTRAVENOUS SEE ADMIN INSTRUCTIONS
Status: DISCONTINUED | OUTPATIENT
Start: 2020-04-18 | End: 2020-04-18

## 2020-04-18 RX ORDER — 0.9 % SODIUM CHLORIDE 0.9 %
20 INTRAVENOUS SOLUTION INTRAVENOUS ONCE
Status: COMPLETED | OUTPATIENT
Start: 2020-04-18 | End: 2020-04-18

## 2020-04-18 RX ORDER — 0.9 % SODIUM CHLORIDE 0.9 %
250 INTRAVENOUS SOLUTION INTRAVENOUS ONCE
Status: DISCONTINUED | OUTPATIENT
Start: 2020-04-18 | End: 2020-04-18

## 2020-04-18 RX ADMIN — AZITHROMYCIN 500 MG: 250 TABLET, FILM COATED ORAL at 12:05

## 2020-04-18 RX ADMIN — IRON SUCROSE 200 MG: 20 INJECTION, SOLUTION INTRAVENOUS at 09:39

## 2020-04-18 RX ADMIN — GABAPENTIN 300 MG: 300 CAPSULE ORAL at 20:40

## 2020-04-18 RX ADMIN — INSULIN GLARGINE 20 UNITS: 100 INJECTION, SOLUTION SUBCUTANEOUS at 20:46

## 2020-04-18 RX ADMIN — SODIUM CHLORIDE 20 ML: 9 INJECTION, SOLUTION INTRAVENOUS at 11:54

## 2020-04-18 RX ADMIN — INSULIN LISPRO 4 UNITS: 100 INJECTION, SOLUTION INTRAVENOUS; SUBCUTANEOUS at 17:51

## 2020-04-18 RX ADMIN — FUROSEMIDE 40 MG: 10 INJECTION, SOLUTION INTRAMUSCULAR; INTRAVENOUS at 15:36

## 2020-04-18 RX ADMIN — PANTOPRAZOLE SODIUM 40 MG: 40 INJECTION, POWDER, FOR SOLUTION INTRAVENOUS at 09:39

## 2020-04-18 RX ADMIN — PANTOPRAZOLE SODIUM 40 MG: 40 INJECTION, POWDER, FOR SOLUTION INTRAVENOUS at 20:40

## 2020-04-18 RX ADMIN — LISINOPRIL 20 MG: 20 TABLET ORAL at 09:40

## 2020-04-18 RX ADMIN — AMLODIPINE BESYLATE 10 MG: 10 TABLET ORAL at 09:40

## 2020-04-18 RX ADMIN — GABAPENTIN 300 MG: 300 CAPSULE ORAL at 09:40

## 2020-04-18 RX ADMIN — SERTRALINE 25 MG: 50 TABLET, FILM COATED ORAL at 09:40

## 2020-04-18 RX ADMIN — INSULIN LISPRO 4 UNITS: 100 INJECTION, SOLUTION INTRAVENOUS; SUBCUTANEOUS at 12:05

## 2020-04-18 RX ADMIN — METOPROLOL TARTRATE 25 MG: 25 TABLET ORAL at 09:40

## 2020-04-18 RX ADMIN — POTASSIUM CHLORIDE 40 MEQ: 1500 TABLET, EXTENDED RELEASE ORAL at 12:05

## 2020-04-18 RX ADMIN — INSULIN LISPRO 2 UNITS: 100 INJECTION, SOLUTION INTRAVENOUS; SUBCUTANEOUS at 09:40

## 2020-04-18 RX ADMIN — FERROUS SULFATE TAB 325 MG (65 MG ELEMENTAL FE) 325 MG: 325 (65 FE) TAB at 09:40

## 2020-04-18 RX ADMIN — FERROUS SULFATE TAB 325 MG (65 MG ELEMENTAL FE) 325 MG: 325 (65 FE) TAB at 17:51

## 2020-04-18 RX ADMIN — GABAPENTIN 300 MG: 300 CAPSULE ORAL at 14:44

## 2020-04-18 RX ADMIN — INSULIN GLARGINE 20 UNITS: 100 INJECTION, SOLUTION SUBCUTANEOUS at 09:39

## 2020-04-18 RX ADMIN — ATORVASTATIN CALCIUM 80 MG: 80 TABLET, FILM COATED ORAL at 09:40

## 2020-04-18 ASSESSMENT — PAIN SCALES - GENERAL
PAINLEVEL_OUTOF10: 0
PAINLEVEL_OUTOF10: 0

## 2020-04-18 NOTE — CONSULTS
(LOPRESSOR) 25 MG tablet Take 25 mg by mouth daily   Yes Historical Provider, MD   hydrOXYzine (VISTARIL) 25 MG capsule Take 25 mg by mouth nightly as needed (for difficulty sleeping)   Yes Historical Provider, MD   sertraline (ZOLOFT) 25 MG tablet Take 25 mg by mouth daily   Yes Historical Provider, MD   FREESTYLE LITE strip TEST as directed four times a day 10/26/14  Yes REI Aquino CNP   gabapentin (NEURONTIN) 300 MG capsule take 1 capsule by mouth three times a day 10/21/14  Yes REI Aquino CNP   lansoprazole (PREVACID) 30 MG capsule take 1 capsule by mouth once daily 9/30/14  Yes REI Aquino CNP   amLODIPine (NORVASC) 10 MG tablet Take 1 tablet by mouth daily. 7/10/14  Yes REI Aquino CNP   aspirin EC 81 MG EC tablet Take 1 tablet by mouth daily. 7/10/14  Yes REI Aquino CNP   Lancets MISC Apply 1 each topically 2 times daily. 4/10/14  Yes REI Aquino CNP   Blood Glucose Monitoring Suppl (FREESTYLE FREEDOM LITE) W/DEVICE KIT by Does not apply route. Please dispense strips 6/27/13  Yes REI Aquino CNP   Insulin Pen Needle (UNIFINE PENTIPS) 31G X 8 MM MISC by Does not apply route.  3/7/12  Yes Mai Ramirez MD   lisinopril (PRINIVIL;ZESTRIL) 20 MG tablet Take 20 mg by mouth daily    Historical Provider, MD   Alcohol Swabs PADS  3/7/12   Mai Ramirez MD      Current Meds:     Current Facility-Administered Medications   Medication Dose Route Frequency Provider Last Rate Last Dose    insulin glargine (LANTUS) injection vial 20 Units  20 Units Subcutaneous BID Clarence Woods MD   20 Units at 04/18/20 0939    ferrous sulfate (IRON 325) tablet 325 mg  325 mg Oral BID  Wendy Wells MD   325 mg at 04/18/20 0940    azithromycin (ZITHROMAX) tablet 500 mg  500 mg Oral Daily Franklin Sawant MD   500 mg at 04/18/20 1205    amLODIPine (NORVASC) tablet 10 mg  10 mg Oral Daily Franklin Sawant MD   10 mg at 04/18/20 0940    atorvastatin (LIPITOR) tablet 80 mg  80 mg Oral Daily Papo Cordova, DO   80 mg at 20 0940    pantoprazole (PROTONIX) injection 40 mg  40 mg Intravenous BID Papo Cordova, DO   40 mg at 20 9733    gabapentin (NEURONTIN) capsule 300 mg  300 mg Oral TID Papo Cordova, DO   300 mg at 20 1444    insulin lispro (HUMALOG) injection vial 0-12 Units  0-12 Units Subcutaneous TID WC Papo Cordova, DO   4 Units at 20 1205    insulin lispro (HUMALOG) injection vial 0-6 Units  0-6 Units Subcutaneous Nightly Papo Cordova, DO   2 Units at 20    glucose (GLUTOSE) 40 % oral gel 15 g  15 g Oral PRN Papo Cordova, DO        dextrose 50 % IV solution  12.5 g Intravenous PRN Papo Cordova, DO        glucagon (rDNA) injection 1 mg  1 mg Intramuscular PRN Papo Cordova, DO        dextrose 5 % solution  100 mL/hr Intravenous PRN Papo Cordova, DO        lisinopril (PRINIVIL;ZESTRIL) tablet 20 mg  20 mg Oral Daily Samantha Resendiz MD   20 mg at 20 0940    metoprolol tartrate (LOPRESSOR) tablet 25 mg  25 mg Oral Daily Samantha Resendiz MD   25 mg at 20 0940    sertraline (ZOLOFT) tablet 25 mg  25 mg Oral Daily Papo Cordova, DO   25 mg at 20 0940     Social History:   Social History     Socioeconomic History    Marital status: Single     Spouse name: Not on file    Number of children: 0    Years of education: ged    Highest education level: Not on file   Occupational History    Occupation: ssi   Social Needs    Financial resource strain: Not on file    Food insecurity     Worry: Not on file     Inability: Not on file   [employer] needs     Medical: Not on file     Non-medical: Not on file   Tobacco Use    Smoking status: Former Smoker     Last attempt to quit: 11/15/1991     Years since quittin.4    Smokeless tobacco: Never Used   Substance and Sexual Activity    Alcohol use: Yes     Comment: one ddrink on new years and thats it   [address] Floor Drug use: No    Sexual activity: Not extremities and has gross sensation in all extremities. Assessment:    Severe anemia, BENJAMIN    CHF - acute exac  NOGUEIRA - COVID negative  scrotal edema. Morbid obesity with BMI > 50    Plan:    Pt needs evaluation of severe BENJAMIN with EGD and colonoscopy, but high risk and too high risk with dyspnea and BMI > 50. Evaluation will have to wait until COVID restrictions have been relaxed as pt not actively hemorrhaging.         Edinson Gutierrez MD  5:37 PM 4/18/2020

## 2020-04-18 NOTE — PROGRESS NOTES
Shortness of breath    SUBJECTIVE     The patient is a 62 y.o. male with PMHx HTN, HLD, IDDM2 with associated neuropathy, GERD, gout, depression and former tobacco abuse --who presented to [facility] with a chief complaint of shortness of breath. He was empirically started on Ceftriaxone and doxycycline on admission with concern for community acquired pneumonia, however patient has upper and lower extremity edema and scrotal edema more consistent with anasarca. He has received diuresis. As well, he was noted to have an Hgb down to 6.6 and has since received 1 unit of PRBCs with improvement to 7.5.      - Patient states that he is still SOB with exertion. He denies CP or cough. He has some erythematous excoriations at the bilateral lower extremities that he states is related to cat sccratches.       OBJECTIVE     Medications:  Reviewed    Infusion Medications    dextrose       Scheduled Medications    insulin glargine  20 Units Subcutaneous BID    iron sucrose  200 mg Intravenous Once    ferrous sulfate  325 mg Oral BID WC    sodium chloride  20 mL Intravenous Once    azithromycin  500 mg Oral Daily    furosemide  40 mg Intravenous Once    potassium chloride  40 mEq Oral Once    amLODIPine  10 mg Oral Daily    atorvastatin  80 mg Oral Daily    pantoprazole  40 mg Intravenous BID    gabapentin  300 mg Oral TID    insulin lispro  0-12 Units Subcutaneous TID WC    insulin lispro  0-6 Units Subcutaneous Nightly    lisinopril  20 mg Oral Daily    metoprolol tartrate  25 mg Oral Daily    sertraline  25 mg Oral Daily     PRN Meds: glucose, dextrose, glucagon (rDNA), dextrose    Ins and outs:      Intake/Output Summary (Last 24 hours) at 4/18/2020 1000  Last data filed at 4/18/2020 0858  Gross per 24 hour   Intake 430 ml   Output 525 ml   Net -95 ml       Physical Examination     /65   Pulse 72   Temp 98.1 °F (36.7 °C) (Oral)   Resp 24   Ht 5' 10\" (1.778 m)   Wt (!) 392 lb 6.7 oz (178 kg)   SpO2 92% Visualized portions of the upper abdomen are within normal limits. The osseous structures are intact. No acute fractures or suspicious osseous lesions. 1. Bibasilar opacities which may represent infiltrate or atelectasis. 2. Cardiomegaly. **This report has been created using voice recognition software. It may contain minor errors which are inherent in voice recognition technology. ** Final report electronically signed by Dr Ingrid Scott on 4/17/2020 2:48 PM      DVT prophylaxis: [x] SCDs                                                          Disposition:    [x] Home

## 2020-04-18 NOTE — H&P
History & Physical       Patient: Carol Rodriguez  YOB: 1955    MRN: [id number]     Acct: [de-identified]    PCP: REI Rod CNP    Date of Admission: 4/17/2020    Date of Service: Patient seen / examined on 04/17/20 and admitted to inpatient with expected LOS greater than two midnights due to medical therapy. ASSESSMENT / PLAN:    Shortness of breath, most likely 2/2 acute on chronic symptomatic anemia +/- ?new decompensated CHF; less likely CAP  Hgb 6.6 on admission. Pro-BNP 2800. No previous ECHO available. No history of CHF documented in EMR. Lower suspicion for PNA given negative procal and absence of fever, hypoxia. Patient remains hemodynamically appropriate on RA. COVID negative. Admit to stepdown unit. Supplemental O2 as needed (keep SpO2 >92%). CTX/doxycycline started empirically on admission / plan to de-escalate therapy if preliminary BCx are negative. Consider trial of IV diuresis pending clinical course overnight, repeat hemoglobin (after transfusion) and results of ECHO in the AM. See management of anemia below. Acute on chronic microcytic/iron deficiency anemia  Confirmed by iron studies in the ED. Patient denies history of iron deficiency and/or blood transfusions in the past. B12/folate nml. FOBT negative. Venofer ordered/given. Currently transfusing 1U pRBC. Follow post-transfusion H&H. Will provide additional transfusion for Hgb < 7.0, ongoing symptomatology and/or signs of hemodynamically instability. Second dose of venofer ordered for the AM. Start daily PO iron supplementation. Monitor daily CBC. Close outpatient follow up encouraged after discharge. Bilateral scrotal edema  Without associated erythema, tenderness or palpable masses. Patient denies urinary symptoms. Most likely 2/2 volume overload in the setting of new/decompensated CHF. IV diuresis per above.  Would consider scrotal US +/- urology consult if no improvement following diuresis. Elevated TSH  No documented history of hypothyroidism. Check free T4 / additional management pending results. Metabolic acidosis with borderline AG  Appears chronic. Etiology unclear. Will trend for now. Reduced eGFR (with normal Cr)  Could represent age-related decline in renal function / ?underlying diabetic nephropathy. Avoid nephrotoxins. Renally dose medications. Repeat BMP in the AM to monitor for improvement following blood transfusion. Acute on chronic HTN  Without evidence of urgency or emergency. Resume BB, ACE-I, norvasc. Monitor BP. HLD  Resume statin. IDDM2 with associated neuropathy  Hyperglycemic on presentation. On metformin, alogliptin as outpatient / hold. Resume basal insulin with additional SSI as needed (target -180). POCT BG checks QAC/HS. Hypoglycemia protocol. Resume gabapentin. Gout  Without acute exacerbation. No current outpatient therapies listed. GERD  Resume PPI. Depression  Resume zoloft, prn vistaril. Former tobacco abuse  Noted. Encourage ongoing abstention. Chief Complaint: shortness of breath    History of Present Illness:  63 y/o M PMHx HTN, HLD, IDDM2 with associated neuropathy, GERD, gout, depression and former tobacco abuse -- [facility] with a chief complaint of shortness of breath. History provided by the patient. Patient endorses 1-month hx progressive shortness of breath with worsening BLE edema. He also admits to recent, new-onset bilateral scrotal edema without associated tenderness or urinary symptoms. No precipitating/alleviating factors identified. Denies fevers/chills, cough, chest pain, palpitations, nausea/emesis, abdominal pain, diarrhea, weakness or syncope. He also denies recent illness or sick contacts. States he was reluctant to come to the hospital when his symptoms first started due to [place] pandemic.  Denies known history of CHF, but states he was diagnosed with 2 heart attacks many years ago in 25 mg by mouth daily    hydrOXYzine (VISTARIL) 25 MG capsule Take 25 mg by mouth nightly as needed (for difficulty sleeping)    sertraline (ZOLOFT) 25 MG tablet Take 25 mg by mouth daily    FREESTYLE LITE strip TEST as directed four times a day    gabapentin (NEURONTIN) 300 MG capsule take 1 capsule by mouth three times a day    lansoprazole (PREVACID) 30 MG capsule take 1 capsule by mouth once daily    amLODIPine (NORVASC) 10 MG tablet Take 1 tablet by mouth daily.  aspirin EC 81 MG EC tablet Take 1 tablet by mouth daily.  Lancets MISC Apply 1 each topically 2 times daily.  Blood Glucose Monitoring Suppl (FREESTYLE FREEDOM LITE) W/DEVICE KIT by Does not apply route. Please dispense strips    Insulin Pen Needle (UNIFINE PENTIPS) 31G X 8 MM MISC by Does not apply route.  lisinopril (PRINIVIL;ZESTRIL) 20 MG tablet Take 20 mg by mouth daily    Alcohol Swabs PADS      Allergies:   Aripiprazole and Citalopram hydrobromide    Social History:   Socioeconomic History    Marital status: Single   Tobacco Use    Smoking status: Former Smoker     Last attempt to quit: 11/15/1991     Years since quittin.4    Smokeless tobacco: Never Used   Substance and Sexual Activity    Alcohol use: Yes     Comment: one drink on new years and thats it    Drug use: No     Family History:   Problem Relation Age of Onset    Heart Attack Mother     Diabetes Mother     Lung Cancer Father     Cancer Maternal Uncle     Cancer Paternal Grandmother     Diabetes Paternal Grandmother     Parkinsonism Paternal Uncle      REVIEW OF SYSTEMS:  A 14-point ROS was obtained and negative, with the exception of pertinent positives as stated in the HPI.     PHYSICAL EXAM:  Vitals:    20 1940 20 19420 19420 2241   BP: (!) 127/58 (!) 127/58  (!) 152/69   Pulse: 79  79 74   Resp: 18   17   Temp: 98.5 °F (36.9 °C)   98.8 °F (37.1 °C)   TempSrc: Oral   Oral   SpO2: 95%   95%   Weight:       Height:

## 2020-04-19 LAB
ANION GAP SERPL CALCULATED.3IONS-SCNC: 14 MEQ/L (ref 8–16)
ANION GAP SERPL CALCULATED.3IONS-SCNC: 14 MEQ/L (ref 8–16)
ANISOCYTOSIS: PRESENT
BASOPHILS # BLD: 1.7 %
BASOPHILS ABSOLUTE: 0.1 THOU/MM3 (ref 0–0.1)
BUN BLDV-MCNC: 11 MG/DL (ref 7–22)
BUN BLDV-MCNC: 13 MG/DL (ref 7–22)
CALCIUM SERPL-MCNC: 8.5 MG/DL (ref 8.5–10.5)
CALCIUM SERPL-MCNC: 8.5 MG/DL (ref 8.5–10.5)
CHLORIDE BLD-SCNC: 105 MEQ/L (ref 98–111)
CHLORIDE BLD-SCNC: 105 MEQ/L (ref 98–111)
CO2: 18 MEQ/L (ref 23–33)
CO2: 19 MEQ/L (ref 23–33)
CREAT SERPL-MCNC: 1.1 MG/DL (ref 0.4–1.2)
CREAT SERPL-MCNC: 1.2 MG/DL (ref 0.4–1.2)
EOSINOPHIL # BLD: 3 %
EOSINOPHILS ABSOLUTE: 0.3 THOU/MM3 (ref 0–0.4)
ERYTHROCYTE [DISTWIDTH] IN BLOOD BY AUTOMATED COUNT: 23 % (ref 11.5–14.5)
ERYTHROCYTE [DISTWIDTH] IN BLOOD BY AUTOMATED COUNT: 54.8 FL (ref 35–45)
GFR SERPL CREATININE-BSD FRML MDRD: 61 ML/MIN/1.73M2
GFR SERPL CREATININE-BSD FRML MDRD: 67 ML/MIN/1.73M2
GLUCOSE BLD-MCNC: 149 MG/DL (ref 70–108)
GLUCOSE BLD-MCNC: 155 MG/DL (ref 70–108)
GLUCOSE BLD-MCNC: 183 MG/DL (ref 70–108)
GLUCOSE BLD-MCNC: 203 MG/DL (ref 70–108)
GLUCOSE BLD-MCNC: 221 MG/DL (ref 70–108)
GLUCOSE BLD-MCNC: 237 MG/DL (ref 70–108)
HCT VFR BLD CALC: 31.2 % (ref 42–52)
HEMOGLOBIN: 8 GM/DL (ref 14–18)
HYPOCHROMIA: PRESENT
IMMATURE GRANS (ABS): 0.06 THOU/MM3 (ref 0–0.07)
IMMATURE GRANULOCYTES: 0.7 %
LYMPHOCYTES # BLD: 18.8 %
LYMPHOCYTES ABSOLUTE: 1.6 THOU/MM3 (ref 1–4.8)
MCH RBC QN AUTO: 17.9 PG (ref 26–33)
MCHC RBC AUTO-ENTMCNC: 25.6 GM/DL (ref 32.2–35.5)
MCV RBC AUTO: 70 FL (ref 80–94)
MONOCYTES # BLD: 12.3 %
MONOCYTES ABSOLUTE: 1.1 THOU/MM3 (ref 0.4–1.3)
NUCLEATED RED BLOOD CELLS: 1 /100 WBC
PLATELET # BLD: 222 THOU/MM3 (ref 130–400)
PLATELET ESTIMATE: ADEQUATE
PMV BLD AUTO: ABNORMAL FL (ref 9.4–12.4)
POIKILOCYTES: ABNORMAL
POTASSIUM SERPL-SCNC: 3.8 MEQ/L (ref 3.5–5.2)
POTASSIUM SERPL-SCNC: 5 MEQ/L (ref 3.5–5.2)
RBC # BLD: 4.46 MILL/MM3 (ref 4.7–6.1)
SCAN OF BLOOD SMEAR: NORMAL
SEG NEUTROPHILS: 63.5 %
SEGMENTED NEUTROPHILS ABSOLUTE COUNT: 5.5 THOU/MM3 (ref 1.8–7.7)
SODIUM BLD-SCNC: 137 MEQ/L (ref 135–145)
SODIUM BLD-SCNC: 138 MEQ/L (ref 135–145)
SOLUBLE TRANSFERRIN RECEPT: 28.5 MG/L (ref 2.2–5)
WBC # BLD: 8.7 THOU/MM3 (ref 4.8–10.8)

## 2020-04-19 PROCEDURE — 2140000000 HC CCU INTERMEDIATE R&B

## 2020-04-19 PROCEDURE — 6370000000 HC RX 637 (ALT 250 FOR IP): Performed by: FAMILY MEDICINE

## 2020-04-19 PROCEDURE — C9113 INJ PANTOPRAZOLE SODIUM, VIA: HCPCS | Performed by: INTERNAL MEDICINE

## 2020-04-19 PROCEDURE — 6370000000 HC RX 637 (ALT 250 FOR IP): Performed by: INTERNAL MEDICINE

## 2020-04-19 PROCEDURE — 82948 REAGENT STRIP/BLOOD GLUCOSE: CPT

## 2020-04-19 PROCEDURE — 6360000002 HC RX W HCPCS: Performed by: INTERNAL MEDICINE

## 2020-04-19 PROCEDURE — 36415 COLL VENOUS BLD VENIPUNCTURE: CPT

## 2020-04-19 PROCEDURE — 99232 SBSQ HOSP IP/OBS MODERATE 35: CPT | Performed by: INTERNAL MEDICINE

## 2020-04-19 PROCEDURE — 80048 BASIC METABOLIC PNL TOTAL CA: CPT

## 2020-04-19 PROCEDURE — 83010 ASSAY OF HAPTOGLOBIN QUANT: CPT

## 2020-04-19 PROCEDURE — 85025 COMPLETE CBC W/AUTO DIFF WBC: CPT

## 2020-04-19 RX ORDER — POTASSIUM CHLORIDE 20 MEQ/1
40 TABLET, EXTENDED RELEASE ORAL 2 TIMES DAILY
Status: DISCONTINUED | OUTPATIENT
Start: 2020-04-19 | End: 2020-04-23 | Stop reason: HOSPADM

## 2020-04-19 RX ORDER — FUROSEMIDE 10 MG/ML
40 INJECTION INTRAMUSCULAR; INTRAVENOUS 3 TIMES DAILY
Status: DISCONTINUED | OUTPATIENT
Start: 2020-04-19 | End: 2020-04-21

## 2020-04-19 RX ADMIN — GABAPENTIN 300 MG: 300 CAPSULE ORAL at 21:16

## 2020-04-19 RX ADMIN — AZITHROMYCIN 500 MG: 250 TABLET, FILM COATED ORAL at 10:04

## 2020-04-19 RX ADMIN — METOPROLOL TARTRATE 25 MG: 25 TABLET ORAL at 07:56

## 2020-04-19 RX ADMIN — FERROUS SULFATE TAB 325 MG (65 MG ELEMENTAL FE) 325 MG: 325 (65 FE) TAB at 07:56

## 2020-04-19 RX ADMIN — ATORVASTATIN CALCIUM 80 MG: 80 TABLET, FILM COATED ORAL at 07:56

## 2020-04-19 RX ADMIN — INSULIN GLARGINE 20 UNITS: 100 INJECTION, SOLUTION SUBCUTANEOUS at 21:16

## 2020-04-19 RX ADMIN — PANTOPRAZOLE SODIUM 40 MG: 40 INJECTION, POWDER, FOR SOLUTION INTRAVENOUS at 10:04

## 2020-04-19 RX ADMIN — AMLODIPINE BESYLATE 10 MG: 10 TABLET ORAL at 07:56

## 2020-04-19 RX ADMIN — SERTRALINE 25 MG: 50 TABLET, FILM COATED ORAL at 07:56

## 2020-04-19 RX ADMIN — MAGNESIUM GLUCONATE 500 MG ORAL TABLET 400 MG: 500 TABLET ORAL at 14:30

## 2020-04-19 RX ADMIN — FUROSEMIDE 40 MG: 10 INJECTION, SOLUTION INTRAMUSCULAR; INTRAVENOUS at 14:30

## 2020-04-19 RX ADMIN — FUROSEMIDE 40 MG: 10 INJECTION, SOLUTION INTRAMUSCULAR; INTRAVENOUS at 21:16

## 2020-04-19 RX ADMIN — GABAPENTIN 300 MG: 300 CAPSULE ORAL at 10:04

## 2020-04-19 RX ADMIN — GABAPENTIN 300 MG: 300 CAPSULE ORAL at 14:30

## 2020-04-19 RX ADMIN — FUROSEMIDE 40 MG: 10 INJECTION, SOLUTION INTRAMUSCULAR; INTRAVENOUS at 10:04

## 2020-04-19 RX ADMIN — PANTOPRAZOLE SODIUM 40 MG: 40 INJECTION, POWDER, FOR SOLUTION INTRAVENOUS at 21:17

## 2020-04-19 RX ADMIN — POTASSIUM CHLORIDE 40 MEQ: 1500 TABLET, EXTENDED RELEASE ORAL at 10:04

## 2020-04-19 RX ADMIN — INSULIN LISPRO 2 UNITS: 100 INJECTION, SOLUTION INTRAVENOUS; SUBCUTANEOUS at 07:56

## 2020-04-19 RX ADMIN — INSULIN LISPRO 4 UNITS: 100 INJECTION, SOLUTION INTRAVENOUS; SUBCUTANEOUS at 18:05

## 2020-04-19 RX ADMIN — LISINOPRIL 20 MG: 20 TABLET ORAL at 07:56

## 2020-04-19 RX ADMIN — POTASSIUM CHLORIDE 40 MEQ: 1500 TABLET, EXTENDED RELEASE ORAL at 23:15

## 2020-04-19 RX ADMIN — FERROUS SULFATE TAB 325 MG (65 MG ELEMENTAL FE) 325 MG: 325 (65 FE) TAB at 18:04

## 2020-04-19 RX ADMIN — INSULIN GLARGINE 20 UNITS: 100 INJECTION, SOLUTION SUBCUTANEOUS at 07:56

## 2020-04-19 ASSESSMENT — PAIN SCALES - GENERAL
PAINLEVEL_OUTOF10: 0

## 2020-04-19 NOTE — PROGRESS NOTES
Pt Name: Leela Alan  MRN: [id number]  [id number]  YOB: 1955  Admit Date: 4/17/2020  1:47 PM  Date of evaluation: 4/19/2020  Primary Care Physician: REI Harrington - NITA   [id number]     PABLITO HUSAIN No complaints. Feels better, scrotal edema improving. O.     Vitals:    04/19/20 0745   BP: (!) 149/65   Pulse: 78   Resp: 18   Temp: 98.8 °F (37.1 °C)   SpO2: 93%       Body mass index is 56.6 kg/m². Awake and alert   clear to auscultation bilaterally   regular rate and rhythm   Soft and nondistended with normal BS, nontender   1+ edema    Current Meds    insulin glargine  20 Units Subcutaneous BID    ferrous sulfate  325 mg Oral BID WC    azithromycin  500 mg Oral Daily    amLODIPine  10 mg Oral Daily    atorvastatin  80 mg Oral Daily    pantoprazole  40 mg Intravenous BID    gabapentin  300 mg Oral TID    insulin lispro  0-12 Units Subcutaneous TID WC    insulin lispro  0-6 Units Subcutaneous Nightly    lisinopril  20 mg Oral Daily    metoprolol tartrate  25 mg Oral Daily    sertraline  25 mg Oral Daily      dextrose       Diet: DIET CARDIAC; Carb Control: 4 carb choices (60 gms)/meal    A.  62 y.o.  male admitted 4/17/20 for NOGUEIRA, severe anemia, scrotal edema. He's never had an EGD or colonoscopy. Severe anemia, BENJAMIN - stable  Transfused 2 PRBC this admit  Iv benjy supplementation begun 4/18/20     CHF - acute exac  NOGUEIRA - COVID negative  Dyspnea improved  scrotal edema - improving     Morbid obesity with BMI > 50     P.       EGD and colonoscopy - high risk BMI > 50  When able due to COVID restrictions on procedures      Reynaldo Vallejo MD  9:59 AM 4/19/2020

## 2020-04-19 NOTE — PROGRESS NOTES
Hospitalist Progress Note    Patient:  Elizabeth Park  YOB: 1955  MRN: [id number]   PCP: REI Hsieh CNP       Acct: [de-identified]  Unit/Bed: 3B-22/022-A    Date of Admission: 4/17/2020    ASSESSMENT/ PLAN     1. Symptomatic anemia/ iron deficiency microcytic anemia: Baseline Hgb 12.6 in March 2018. Admit Hgb down to 6.6. s/p 1 unit of PRBC with Hgb to 7.5; 1 more unit of PRBC ordered on 4/18- Hgb up to 8.0 Vitamin B12, folate unremarkable. Iron studies indicate iron deficiency anemia. FOBTnegative, GI consulted for further diagnostic studies Echocardiogram pending. The patient states that he has sustained cat scratches recently so will check for evidence of hemolytic anemia including LDH and haptoglobin. S/p IV iron  2. Anasarca with acute congestive heart failure of unspecified type, has scrotal edema, upper and lower bilateral edema that is thought to have been precipitated by anemia. Hold off on scrotal US unless scrotal edema persists after diuresis. Continued diuresis with intermittent IV lasix. Repeat CXR in am  3. Dyspnea thought to be related to a combination of symptomatic anemia and acute congestive heart failure:  Likely to improve with continued diuresis and blood transfusions. Will continue to monitor. Doubt pneumonia- procalcitonin unremarkable and patient denied cough or chest pain,  therefore Ceftriaxone and doxycycline discontinued. 4. COVID-19 negative. 5. Abnormal thyroid function tests: TSH 4.820. Recommended recheck TSH and free T4 in 6 weeks  6. Essential HTN  7. HLD: on atorvastatin  8. DM Type 2 complicated by peripheral neuropathy: ISS on gabapentin  9. GERD: Protonix  10. Chronic depression and anxiety: Zoloft and Vistaril  11. History of tobacco dependence  12. Morbid obesity: BMI 51.3  13.  Lower extremity excoriations related to cat scratches: started on azithromycin      Anticipated Discharge in : 1 to 2 days    Code Status: Prior    Electronically signed by Anjali Olmstead MD on 4/19/2020 at 2:07 PM      Chief Complaint   Shortness of breath    SUBJECTIVE     The patient is a 62 y.o. male with PMHx HTN, HLD, IDDM2 with associated neuropathy, GERD, gout, depression and former tobacco abuse --who presented to [facility] with a chief complaint of shortness of breath. He was empirically started on Ceftriaxone and doxycycline on admission with concern for community acquired pneumonia, however patient has upper and lower extremity edema and scrotal edema more consistent with anasarca. He has received diuresis. As well, he was noted to have an Hgb down to 6.6 and has since received 1 unit of PRBCs with improvement to 7.5.      - SOB still. Still has peripheral edema. OBJECTIVE     Medications:  Reviewed    Infusion Medications    dextrose       Scheduled Medications    furosemide  40 mg Intravenous TID    potassium chloride  40 mEq Oral BID    magnesium oxide  400 mg Oral Daily    insulin glargine  20 Units Subcutaneous BID    ferrous sulfate  325 mg Oral BID WC    azithromycin  500 mg Oral Daily    amLODIPine  10 mg Oral Daily    atorvastatin  80 mg Oral Daily    pantoprazole  40 mg Intravenous BID    gabapentin  300 mg Oral TID    insulin lispro  0-12 Units Subcutaneous TID WC    insulin lispro  0-6 Units Subcutaneous Nightly    lisinopril  20 mg Oral Daily    metoprolol tartrate  25 mg Oral Daily    sertraline  25 mg Oral Daily     PRN Meds: glucose, dextrose, glucagon (rDNA), dextrose    Ins and outs:      Intake/Output Summary (Last 24 hours) at 4/19/2020 1407  Last data filed at 4/19/2020 1230  Gross per 24 hour   Intake 1730.66 ml   Output 0 ml   Net 1730.66 ml       Physical Examination     BP (!) 156/65   Pulse 73   Temp 98.3 °F (36.8 °C) (Oral)   Resp 18   Ht 5' 10\" (1.778 m)   Wt (!) 357 lb 1.6 oz (162 kg)   SpO2 93%   BMI 51.24 kg/m²     General appearance: No apparent distress. HEENT: Extraocular motion intact.   Trachea pleural effusion or pneumothorax. Visualized portions of the upper abdomen are within normal limits. The osseous structures are intact. No acute fractures or suspicious osseous lesions. 1. Bibasilar opacities which may represent infiltrate or atelectasis. 2. Cardiomegaly. **This report has been created using voice recognition software. It may contain minor errors which are inherent in voice recognition technology. ** Final report electronically signed by Dr Renetta Luis on 4/17/2020 2:48 PM        DVT prophylaxis: [x] SCDs                                                          Disposition:    [x] Home

## 2020-04-20 ENCOUNTER — APPOINTMENT (OUTPATIENT)
Dept: GENERAL RADIOLOGY | Age: 65
DRG: 663 | End: 2020-04-20
Payer: COMMERCIAL

## 2020-04-20 LAB
ANION GAP SERPL CALCULATED.3IONS-SCNC: 11 MEQ/L (ref 8–16)
ANION GAP SERPL CALCULATED.3IONS-SCNC: 13 MEQ/L (ref 8–16)
ANISOCYTOSIS: PRESENT
BASOPHILS # BLD: 1.5 %
BASOPHILS ABSOLUTE: 0.1 THOU/MM3 (ref 0–0.1)
BUN BLDV-MCNC: 12 MG/DL (ref 7–22)
BUN BLDV-MCNC: 12 MG/DL (ref 7–22)
CALCIUM SERPL-MCNC: 8.5 MG/DL (ref 8.5–10.5)
CALCIUM SERPL-MCNC: 8.9 MG/DL (ref 8.5–10.5)
CHLORIDE BLD-SCNC: 102 MEQ/L (ref 98–111)
CHLORIDE BLD-SCNC: 104 MEQ/L (ref 98–111)
CO2: 21 MEQ/L (ref 23–33)
CO2: 25 MEQ/L (ref 23–33)
CREAT SERPL-MCNC: 1 MG/DL (ref 0.4–1.2)
CREAT SERPL-MCNC: 1.1 MG/DL (ref 0.4–1.2)
EOSINOPHIL # BLD: 3.9 %
EOSINOPHILS ABSOLUTE: 0.3 THOU/MM3 (ref 0–0.4)
ERYTHROCYTE [DISTWIDTH] IN BLOOD BY AUTOMATED COUNT: 23.8 % (ref 11.5–14.5)
ERYTHROCYTE [DISTWIDTH] IN BLOOD BY AUTOMATED COUNT: 54.4 FL (ref 35–45)
GFR SERPL CREATININE-BSD FRML MDRD: 67 ML/MIN/1.73M2
GFR SERPL CREATININE-BSD FRML MDRD: 75 ML/MIN/1.73M2
GLUCOSE BLD-MCNC: 174 MG/DL (ref 70–108)
GLUCOSE BLD-MCNC: 182 MG/DL (ref 70–108)
GLUCOSE BLD-MCNC: 197 MG/DL (ref 70–108)
GLUCOSE BLD-MCNC: 201 MG/DL (ref 70–108)
GLUCOSE BLD-MCNC: 223 MG/DL (ref 70–108)
GLUCOSE BLD-MCNC: 245 MG/DL (ref 70–108)
HCT VFR BLD CALC: 30.8 % (ref 42–52)
HEMOGLOBIN: 8.1 GM/DL (ref 14–18)
HYPOCHROMIA: PRESENT
IMMATURE GRANS (ABS): 0.05 THOU/MM3 (ref 0–0.07)
IMMATURE GRANULOCYTES: 0.6 %
LD: 225 U/L (ref 100–190)
LV EF: 58 %
LVEF MODALITY: NORMAL
LYMPHOCYTES # BLD: 18.2 %
LYMPHOCYTES ABSOLUTE: 1.4 THOU/MM3 (ref 1–4.8)
MAGNESIUM: 1.8 MG/DL (ref 1.6–2.4)
MCH RBC QN AUTO: 18.5 PG (ref 26–33)
MCHC RBC AUTO-ENTMCNC: 26.3 GM/DL (ref 32.2–35.5)
MCV RBC AUTO: 70.3 FL (ref 80–94)
MONOCYTES # BLD: 11.8 %
MONOCYTES ABSOLUTE: 0.9 THOU/MM3 (ref 0.4–1.3)
NUCLEATED RED BLOOD CELLS: 1 /100 WBC
PLATELET # BLD: 216 THOU/MM3 (ref 130–400)
PMV BLD AUTO: 10.7 FL (ref 9.4–12.4)
POTASSIUM SERPL-SCNC: 4 MEQ/L (ref 3.5–5.2)
POTASSIUM SERPL-SCNC: 4.3 MEQ/L (ref 3.5–5.2)
RBC # BLD: 4.38 MILL/MM3 (ref 4.7–6.1)
REASON FOR REJECTION: NORMAL
REJECTED TEST: NORMAL
SEG NEUTROPHILS: 64 %
SEGMENTED NEUTROPHILS ABSOLUTE COUNT: 5.1 THOU/MM3 (ref 1.8–7.7)
SODIUM BLD-SCNC: 138 MEQ/L (ref 135–145)
SODIUM BLD-SCNC: 138 MEQ/L (ref 135–145)
WBC # BLD: 7.9 THOU/MM3 (ref 4.8–10.8)

## 2020-04-20 PROCEDURE — 36415 COLL VENOUS BLD VENIPUNCTURE: CPT

## 2020-04-20 PROCEDURE — 83735 ASSAY OF MAGNESIUM: CPT

## 2020-04-20 PROCEDURE — 6370000000 HC RX 637 (ALT 250 FOR IP): Performed by: INTERNAL MEDICINE

## 2020-04-20 PROCEDURE — 6370000000 HC RX 637 (ALT 250 FOR IP): Performed by: FAMILY MEDICINE

## 2020-04-20 PROCEDURE — 6360000002 HC RX W HCPCS: Performed by: INTERNAL MEDICINE

## 2020-04-20 PROCEDURE — 85025 COMPLETE CBC W/AUTO DIFF WBC: CPT

## 2020-04-20 PROCEDURE — 93306 TTE W/DOPPLER COMPLETE: CPT

## 2020-04-20 PROCEDURE — 99232 SBSQ HOSP IP/OBS MODERATE 35: CPT | Performed by: INTERNAL MEDICINE

## 2020-04-20 PROCEDURE — 83615 LACTATE (LD) (LDH) ENZYME: CPT

## 2020-04-20 PROCEDURE — 82948 REAGENT STRIP/BLOOD GLUCOSE: CPT

## 2020-04-20 PROCEDURE — C9113 INJ PANTOPRAZOLE SODIUM, VIA: HCPCS | Performed by: INTERNAL MEDICINE

## 2020-04-20 PROCEDURE — 71045 X-RAY EXAM CHEST 1 VIEW: CPT

## 2020-04-20 PROCEDURE — 2140000000 HC CCU INTERMEDIATE R&B

## 2020-04-20 PROCEDURE — 80048 BASIC METABOLIC PNL TOTAL CA: CPT

## 2020-04-20 RX ORDER — PANTOPRAZOLE SODIUM 40 MG/1
40 TABLET, DELAYED RELEASE ORAL
Status: DISCONTINUED | OUTPATIENT
Start: 2020-04-21 | End: 2020-04-23 | Stop reason: HOSPADM

## 2020-04-20 RX ORDER — AZITHROMYCIN 250 MG/1
500 TABLET, FILM COATED ORAL DAILY
Status: COMPLETED | OUTPATIENT
Start: 2020-04-21 | End: 2020-04-22

## 2020-04-20 RX ADMIN — INSULIN GLARGINE 20 UNITS: 100 INJECTION, SOLUTION SUBCUTANEOUS at 20:35

## 2020-04-20 RX ADMIN — POTASSIUM CHLORIDE 40 MEQ: 1500 TABLET, EXTENDED RELEASE ORAL at 09:22

## 2020-04-20 RX ADMIN — FUROSEMIDE 40 MG: 10 INJECTION, SOLUTION INTRAMUSCULAR; INTRAVENOUS at 14:52

## 2020-04-20 RX ADMIN — GABAPENTIN 300 MG: 300 CAPSULE ORAL at 14:52

## 2020-04-20 RX ADMIN — FUROSEMIDE 40 MG: 10 INJECTION, SOLUTION INTRAMUSCULAR; INTRAVENOUS at 20:36

## 2020-04-20 RX ADMIN — ATORVASTATIN CALCIUM 80 MG: 80 TABLET, FILM COATED ORAL at 09:22

## 2020-04-20 RX ADMIN — POTASSIUM CHLORIDE 40 MEQ: 1500 TABLET, EXTENDED RELEASE ORAL at 20:35

## 2020-04-20 RX ADMIN — MAGNESIUM GLUCONATE 500 MG ORAL TABLET 400 MG: 500 TABLET ORAL at 09:22

## 2020-04-20 RX ADMIN — PANTOPRAZOLE SODIUM 40 MG: 40 INJECTION, POWDER, FOR SOLUTION INTRAVENOUS at 09:25

## 2020-04-20 RX ADMIN — FERROUS SULFATE TAB 325 MG (65 MG ELEMENTAL FE) 325 MG: 325 (65 FE) TAB at 16:25

## 2020-04-20 RX ADMIN — INSULIN LISPRO 2 UNITS: 100 INJECTION, SOLUTION INTRAVENOUS; SUBCUTANEOUS at 16:25

## 2020-04-20 RX ADMIN — SERTRALINE 25 MG: 50 TABLET, FILM COATED ORAL at 09:22

## 2020-04-20 RX ADMIN — INSULIN LISPRO 2 UNITS: 100 INJECTION, SOLUTION INTRAVENOUS; SUBCUTANEOUS at 09:23

## 2020-04-20 RX ADMIN — AZITHROMYCIN 500 MG: 250 TABLET, FILM COATED ORAL at 09:22

## 2020-04-20 RX ADMIN — FERROUS SULFATE TAB 325 MG (65 MG ELEMENTAL FE) 325 MG: 325 (65 FE) TAB at 09:26

## 2020-04-20 RX ADMIN — INSULIN GLARGINE 20 UNITS: 100 INJECTION, SOLUTION SUBCUTANEOUS at 09:23

## 2020-04-20 RX ADMIN — AMLODIPINE BESYLATE 10 MG: 10 TABLET ORAL at 09:22

## 2020-04-20 RX ADMIN — LISINOPRIL 20 MG: 20 TABLET ORAL at 09:22

## 2020-04-20 RX ADMIN — GABAPENTIN 300 MG: 300 CAPSULE ORAL at 09:22

## 2020-04-20 RX ADMIN — FUROSEMIDE 40 MG: 10 INJECTION, SOLUTION INTRAMUSCULAR; INTRAVENOUS at 09:25

## 2020-04-20 RX ADMIN — METOPROLOL TARTRATE 25 MG: 25 TABLET ORAL at 09:22

## 2020-04-20 RX ADMIN — INSULIN LISPRO 4 UNITS: 100 INJECTION, SOLUTION INTRAVENOUS; SUBCUTANEOUS at 13:04

## 2020-04-20 RX ADMIN — GABAPENTIN 300 MG: 300 CAPSULE ORAL at 20:35

## 2020-04-20 ASSESSMENT — PAIN SCALES - GENERAL
PAINLEVEL_OUTOF10: 0

## 2020-04-20 NOTE — PLAN OF CARE
Problem: Pain Control  Goal: Maintain pain level at or below patient's acceptable level (or 5 if patient is unable to determine acceptable level)  Outcome: Met This Shift  Flowsheets (Taken 4/20/2020 0426)  Patient's Stated Pain Goal: No pain  Note: Patient denies pain throughout shift. Ongoing assessment & interventions provided throughout shift. Reminded patient to report any pain, pressure, or shortness of breath to the nurse. Assisted patient in repositioning. Will continue to assess. Problem: Falls - Risk of:  Goal: Will remain free from falls  Description: Will remain free from falls  Outcome: Ongoing  Note: Patient free of falls. Call light within reach. Bed in lowest position. Side rails up x 2. Nonskid socks on. Bed alarm on. Hourly rounding continues. Problem: Falls - Risk of:  Goal: Absence of physical injury  Description: Absence of physical injury  Outcome: Ongoing  Note: Patient free from physical injuries throughout shift. Hourly rounding continues. Will continue to monitor. Problem: DAILY CARE  Goal: Daily care needs are met  Outcome: Ongoing  Note: Daily care needs are met. Will continue to assess. Problem: DISCHARGE BARRIERS  Goal: Patient's continuum of care needs are met  Outcome: Ongoing  Note: Patient is from home with family. Denies needs. Will continue to assess. Problem: Metabolic:  Goal: Ability to maintain appropriate glucose levels will improve  Description: Ability to maintain appropriate glucose levels will improve  Outcome: Ongoing  Note: Blood glucose 203 at bedtime. Insulin given per physician's order. Will continue to monitor, ACHS. Problem: Skin Integrity:  Goal: Absence of new skin breakdown  Description: Absence of new skin breakdown  Outcome: Ongoing  Note: No new skin breakdown noted. Ongoing assessment & interventions provided throughout shift. Moisture barrier used throughout shift. Skin assessments provided.   Encouraging/assisting

## 2020-04-20 NOTE — PROGRESS NOTES
Xr Chest Portable    Result Date: 4/20/2020  PROCEDURE: XR CHEST PORTABLE CLINICAL INFORMATION: pulmonary edema. COMPARISON: Chest x-ray dated 4/17/2020 TECHNIQUE: AP Portable chest xray FINDINGS: Lines/tubes/devices: none Lungs/pleura:  No pneumonia, pulmonary edema, or obvious mass. No pleural effusion. No pneumothorax. Heart: There is borderline cardiomegaly. Mediastinum/arabella: No obvious mass or adenopathy. Skeleton: No significant bone or joint abnormality. No acute cardiopulmonary disease. Borderline cardiomegaly. **This report has been created using voice recognition software. It may contain minor errors which are inherent in voice recognition technology. ** Final report electronically signed by Dr. Errol Blankenship on 4/20/2020 2:10 AM    Xr Chest Portable    Result Date: 4/17/2020  PROCEDURE: XR CHEST PORTABLE CLINICAL INFORMATION: 67-year-old male with shortness of breath and chest pain. COMPARISON: Chest x-ray 3/12/2018. TECHNIQUE: 2 AP views of the chest were obtained in the upright position. FINDINGS: There is cardiomegaly. There are low lung volumes which accentuates the pulmonary vasculature. Bibasilar opacities are seen which may represent infiltrates or atelectasis. There is no significant pleural effusion or pneumothorax. Visualized portions of the upper abdomen are within normal limits. The osseous structures are intact. No acute fractures or suspicious osseous lesions. 1. Bibasilar opacities which may represent infiltrate or atelectasis. 2. Cardiomegaly. **This report has been created using voice recognition software. It may contain minor errors which are inherent in voice recognition technology. ** Final report electronically signed by Dr Giovanny Ramirez on 4/17/2020 2:48 PM        DVT prophylaxis: [x] SCDs                                                          Disposition:    [x] Home

## 2020-04-20 NOTE — PLAN OF CARE
Problem: Falls - Risk of:  Goal: Will remain free from falls  Description: Will remain free from falls  4/20/2020 1316 by Lalo Gonzalez RN  Outcome: Ongoing  Note: Assessment & interventions provided throughout shift. Bed locked & in low position, call light in reach, side-rails up x2, non-slip socks on when ambulating, reminded patient to use call light to call for assistance. Problem: DAILY CARE  Goal: Daily care needs are met  4/20/2020 1316 by Lalo Gonzalez RN  Outcome: Ongoing     Problem: DISCHARGE BARRIERS  Goal: Patient's continuum of care needs are met  4/20/2020 1316 by Lalo Gonzalez RN  Outcome: Ongoing     Problem: Metabolic:  Goal: Ability to maintain appropriate glucose levels will improve  Description: Ability to maintain appropriate glucose levels will improve  4/20/2020 1316 by Lalo Gonzalez RN  Outcome: Ongoing  Note: Monitoring blood glucose and administering insulin as needed. Problem: Skin Integrity:  Goal: Absence of new skin breakdown  Description: Absence of new skin breakdown  4/20/2020 1316 by Lalo Gonzalez RN  Outcome: Ongoing  Note: Tiny-care every two hours and monitoring for skin breakdown. Problem: Pain Control  Goal: Maintain pain level at or below patient's acceptable level (or 5 if patient is unable to determine acceptable level)  4/20/2020 1316 by Lalo Gonzalez RN  Outcome: Ongoing     Problem: Physical Regulation:  Goal: Complications related to the disease process, condition or treatment will be avoided or minimized  Description: Complications related to the disease process, condition or treatment will be avoided or minimized  4/20/2020 1316 by Lalo Gonzalez RN  Outcome: Ongoing  Note: Monitoring daily labs     Care plan reviewed with patient. Patient verbalizes understanding of the care plan and contributed to goal setting.

## 2020-04-20 NOTE — PROGRESS NOTES
Gastroenterology Progress Note:     Patient Name:  Autumn Nicholas   MRN: [id number]  [id number]  YOB: 1955  Admit Date: 4/17/2020  1:47 PM  Primary Care Physician: REI Ochoa - CNP   3B-22/022-A     Patient seen and examined. 24 hours events and chart reviewed. Subjective: Patient resting in bed. Denies abdominal pain, nausea, and vomiting. He had 2 bowel movements yesterday that were brown. Hgb 8.1    Objective:  /63   Pulse 68   Temp 99.3 °F (37.4 °C) (Axillary)   Resp 18   Ht 5' 10\" (1.778 m)   Wt (!) 355 lb 2.6 oz (161.1 kg)   SpO2 91%   BMI 50.96 kg/m²     Physical Exam:    General:  Nourished in no distress  HEENT: Atraumatic, normocephalic. Moist oral mucous membranes. Neck: Supple without adenopathy, JVD, thyromegaly or masses. Trachea midline. CV: Heart RRR, no murmurs, rubs, gallops. Resp: Even, easy without cough or accessory use. Lungs clear to ascultation bilaterally. Abd: Round, soft, obese, nontender. No hepatosplenomegaly or mass present. Active bowel sounds heard. No distention noted. Ext:  Without cyanosis, clubbing. 1+  edema. Skin: Pink, warm, dry  Neuro:  Alert, oriented x 3 with no obvious deficits.        Rectal: deferred    Labs:   CBC:   Lab Results   Component Value Date    WBC 7.9 04/20/2020    HGB 8.1 04/20/2020    HCT 30.8 04/20/2020    MCV 70.3 04/20/2020     04/20/2020     BMP:   Lab Results   Component Value Date     04/20/2020    K 4.0 04/20/2020     04/20/2020    CO2 21 04/20/2020    PHOS 3.4 03/14/2018    BUN 12 04/20/2020    CREATININE 1.0 04/20/2020    CALCIUM 8.5 04/20/2020     Lipids:   Lab Results   Component Value Date    ALKPHOS 62 04/17/2020    ALT 6 04/17/2020    AST 14 04/17/2020    BILITOT 0.5 04/17/2020    BILIDIR <0.2 03/07/2018    LABALBU 3.8 04/17/2020    AMYLASE 28 03/07/2018    LIPASE 46.8 03/07/2018     Significant Diagnostic Studies:   CXR 04/20/20      Impression       No acute cardiopulmonary disease. Borderline cardiomegaly. Current Meds:  Scheduled Meds:   [START ON 4/21/2020] azithromycin  500 mg Oral Daily    furosemide  40 mg Intravenous TID    potassium chloride  40 mEq Oral BID    magnesium oxide  400 mg Oral Daily    insulin glargine  20 Units Subcutaneous BID    ferrous sulfate  325 mg Oral BID     amLODIPine  10 mg Oral Daily    atorvastatin  80 mg Oral Daily    pantoprazole  40 mg Intravenous BID    gabapentin  300 mg Oral TID    insulin lispro  0-12 Units Subcutaneous TID     insulin lispro  0-6 Units Subcutaneous Nightly    lisinopril  20 mg Oral Daily    metoprolol tartrate  25 mg Oral Daily    sertraline  25 mg Oral Daily     Continuous Infusions:   dextrose         Assessment:  63 yo M admitted 04/17/20 dyspnea with exertion, severe anemia, and scrotal edema. He has never had an EGD or colonoscopy. Initial Hgb 6.6, received 2 units PRBC total. Received 2 doses of IV Venofer. FOBT negative. COVID test negative. He has never had an EGD or colonoscopy. 1. Severe iron deficiency anemia- s/p 2 doses IV Venofer, s/p 2 units PRBC, FOBT negative, no active GI bleeding  2. Acute CHF exacerbation  3. NOGUEIRA, improving- COVID negative  4. Scrotal edema  5. Morbid obesity  6. HTN  7. HLD  8. GERD  9. DM  10. Depression & anxiety    Plan:    1. Monitor H & H, transfuse prn  2. Nursing to monitor stool output  3. Continue iron supplementation  4. PPI daily  5. Cardiac, carb count diet  6. Will need EGD & colonoscopy outpatient once COVID restrictions are lifted, will be high risk with BMI >50  7.  Supportive care per primary team  Will follow    Case reviewed and impression/plan reviewed in collaboration with Dr. Renea Grullon  Electronically signed by REI Hood CNP on 4/20/2020 at 12:51 PM    [facility]

## 2020-04-21 LAB
ANION GAP SERPL CALCULATED.3IONS-SCNC: 12 MEQ/L (ref 8–16)
ANION GAP SERPL CALCULATED.3IONS-SCNC: 14 MEQ/L (ref 8–16)
ANISOCYTOSIS: PRESENT
BASOPHILS # BLD: 1.2 %
BASOPHILS ABSOLUTE: 0.1 THOU/MM3 (ref 0–0.1)
BUN BLDV-MCNC: 12 MG/DL (ref 7–22)
BUN BLDV-MCNC: 14 MG/DL (ref 7–22)
CALCIUM SERPL-MCNC: 8.6 MG/DL (ref 8.5–10.5)
CALCIUM SERPL-MCNC: 8.6 MG/DL (ref 8.5–10.5)
CHLORIDE BLD-SCNC: 98 MEQ/L (ref 98–111)
CHLORIDE BLD-SCNC: 99 MEQ/L (ref 98–111)
CO2: 24 MEQ/L (ref 23–33)
CO2: 26 MEQ/L (ref 23–33)
CREAT SERPL-MCNC: 1.1 MG/DL (ref 0.4–1.2)
CREAT SERPL-MCNC: 1.3 MG/DL (ref 0.4–1.2)
EOSINOPHIL # BLD: 4.8 %
EOSINOPHILS ABSOLUTE: 0.4 THOU/MM3 (ref 0–0.4)
ERYTHROCYTE [DISTWIDTH] IN BLOOD BY AUTOMATED COUNT: 25.9 % (ref 11.5–14.5)
ERYTHROCYTE [DISTWIDTH] IN BLOOD BY AUTOMATED COUNT: 57.1 FL (ref 35–45)
GFR SERPL CREATININE-BSD FRML MDRD: 56 ML/MIN/1.73M2
GFR SERPL CREATININE-BSD FRML MDRD: 67 ML/MIN/1.73M2
GLUCOSE BLD-MCNC: 180 MG/DL (ref 70–108)
GLUCOSE BLD-MCNC: 196 MG/DL (ref 70–108)
GLUCOSE BLD-MCNC: 207 MG/DL (ref 70–108)
GLUCOSE BLD-MCNC: 234 MG/DL (ref 70–108)
GLUCOSE BLD-MCNC: 255 MG/DL (ref 70–108)
GLUCOSE BLD-MCNC: 258 MG/DL (ref 70–108)
HAPTOGLOBIN: 205 MG/DL (ref 30–200)
HCT VFR BLD CALC: 32 % (ref 42–52)
HEMOGLOBIN: 8.4 GM/DL (ref 14–18)
HYPOCHROMIA: PRESENT
IMMATURE GRANS (ABS): 0.03 THOU/MM3 (ref 0–0.07)
IMMATURE GRANULOCYTES: 0.4 %
LYMPHOCYTES # BLD: 17.6 %
LYMPHOCYTES ABSOLUTE: 1.3 THOU/MM3 (ref 1–4.8)
MCH RBC QN AUTO: 18.5 PG (ref 26–33)
MCHC RBC AUTO-ENTMCNC: 26.3 GM/DL (ref 32.2–35.5)
MCV RBC AUTO: 70.6 FL (ref 80–94)
MONOCYTES # BLD: 11.8 %
MONOCYTES ABSOLUTE: 0.9 THOU/MM3 (ref 0.4–1.3)
NUCLEATED RED BLOOD CELLS: 0 /100 WBC
PLATELET # BLD: 210 THOU/MM3 (ref 130–400)
POTASSIUM SERPL-SCNC: 3.9 MEQ/L (ref 3.5–5.2)
POTASSIUM SERPL-SCNC: 4.3 MEQ/L (ref 3.5–5.2)
RBC # BLD: 4.53 MILL/MM3 (ref 4.7–6.1)
SEG NEUTROPHILS: 64.2 %
SEGMENTED NEUTROPHILS ABSOLUTE COUNT: 4.7 THOU/MM3 (ref 1.8–7.7)
SODIUM BLD-SCNC: 136 MEQ/L (ref 135–145)
SODIUM BLD-SCNC: 137 MEQ/L (ref 135–145)
WBC # BLD: 7.3 THOU/MM3 (ref 4.8–10.8)

## 2020-04-21 PROCEDURE — 83010 ASSAY OF HAPTOGLOBIN QUANT: CPT

## 2020-04-21 PROCEDURE — 6370000000 HC RX 637 (ALT 250 FOR IP): Performed by: INTERNAL MEDICINE

## 2020-04-21 PROCEDURE — 6370000000 HC RX 637 (ALT 250 FOR IP): Performed by: NURSE PRACTITIONER

## 2020-04-21 PROCEDURE — 80048 BASIC METABOLIC PNL TOTAL CA: CPT

## 2020-04-21 PROCEDURE — 1200000003 HC TELEMETRY R&B

## 2020-04-21 PROCEDURE — 82948 REAGENT STRIP/BLOOD GLUCOSE: CPT

## 2020-04-21 PROCEDURE — 99232 SBSQ HOSP IP/OBS MODERATE 35: CPT | Performed by: INTERNAL MEDICINE

## 2020-04-21 PROCEDURE — 6370000000 HC RX 637 (ALT 250 FOR IP): Performed by: FAMILY MEDICINE

## 2020-04-21 PROCEDURE — 99254 IP/OBS CNSLTJ NEW/EST MOD 60: CPT | Performed by: INTERNAL MEDICINE

## 2020-04-21 PROCEDURE — 36415 COLL VENOUS BLD VENIPUNCTURE: CPT

## 2020-04-21 PROCEDURE — 85025 COMPLETE CBC W/AUTO DIFF WBC: CPT

## 2020-04-21 PROCEDURE — 94762 N-INVAS EAR/PLS OXIMTRY CONT: CPT

## 2020-04-21 PROCEDURE — 6360000002 HC RX W HCPCS: Performed by: INTERNAL MEDICINE

## 2020-04-21 RX ORDER — FUROSEMIDE 10 MG/ML
40 INJECTION INTRAMUSCULAR; INTRAVENOUS DAILY
Status: DISCONTINUED | OUTPATIENT
Start: 2020-04-22 | End: 2020-04-23

## 2020-04-21 RX ADMIN — INSULIN LISPRO 2 UNITS: 100 INJECTION, SOLUTION INTRAVENOUS; SUBCUTANEOUS at 11:44

## 2020-04-21 RX ADMIN — GABAPENTIN 300 MG: 300 CAPSULE ORAL at 21:54

## 2020-04-21 RX ADMIN — GABAPENTIN 300 MG: 300 CAPSULE ORAL at 09:15

## 2020-04-21 RX ADMIN — FERROUS SULFATE TAB 325 MG (65 MG ELEMENTAL FE) 325 MG: 325 (65 FE) TAB at 09:16

## 2020-04-21 RX ADMIN — INSULIN GLARGINE 20 UNITS: 100 INJECTION, SOLUTION SUBCUTANEOUS at 21:57

## 2020-04-21 RX ADMIN — FUROSEMIDE 40 MG: 10 INJECTION, SOLUTION INTRAMUSCULAR; INTRAVENOUS at 09:19

## 2020-04-21 RX ADMIN — GABAPENTIN 300 MG: 300 CAPSULE ORAL at 14:42

## 2020-04-21 RX ADMIN — ATORVASTATIN CALCIUM 80 MG: 80 TABLET, FILM COATED ORAL at 09:16

## 2020-04-21 RX ADMIN — MAGNESIUM GLUCONATE 500 MG ORAL TABLET 400 MG: 500 TABLET ORAL at 09:15

## 2020-04-21 RX ADMIN — METOPROLOL TARTRATE 25 MG: 25 TABLET ORAL at 09:15

## 2020-04-21 RX ADMIN — FERROUS SULFATE TAB 325 MG (65 MG ELEMENTAL FE) 325 MG: 325 (65 FE) TAB at 18:10

## 2020-04-21 RX ADMIN — AZITHROMYCIN 500 MG: 250 TABLET, FILM COATED ORAL at 09:16

## 2020-04-21 RX ADMIN — AMLODIPINE BESYLATE 10 MG: 10 TABLET ORAL at 09:16

## 2020-04-21 RX ADMIN — INSULIN GLARGINE 20 UNITS: 100 INJECTION, SOLUTION SUBCUTANEOUS at 09:24

## 2020-04-21 RX ADMIN — PANTOPRAZOLE SODIUM 40 MG: 40 TABLET, DELAYED RELEASE ORAL at 04:55

## 2020-04-21 RX ADMIN — INSULIN LISPRO 4 UNITS: 100 INJECTION, SOLUTION INTRAVENOUS; SUBCUTANEOUS at 09:20

## 2020-04-21 RX ADMIN — LISINOPRIL 20 MG: 20 TABLET ORAL at 09:15

## 2020-04-21 RX ADMIN — INSULIN LISPRO 4 UNITS: 100 INJECTION, SOLUTION INTRAVENOUS; SUBCUTANEOUS at 18:10

## 2020-04-21 RX ADMIN — SERTRALINE 25 MG: 50 TABLET, FILM COATED ORAL at 09:15

## 2020-04-21 RX ADMIN — POTASSIUM CHLORIDE 40 MEQ: 1500 TABLET, EXTENDED RELEASE ORAL at 09:15

## 2020-04-21 RX ADMIN — FUROSEMIDE 40 MG: 10 INJECTION, SOLUTION INTRAMUSCULAR; INTRAVENOUS at 14:42

## 2020-04-21 RX ADMIN — POTASSIUM CHLORIDE 40 MEQ: 1500 TABLET, EXTENDED RELEASE ORAL at 21:52

## 2020-04-21 ASSESSMENT — PAIN SCALES - GENERAL
PAINLEVEL_OUTOF10: 0

## 2020-04-21 NOTE — PLAN OF CARE
Problem: Falls - Risk of:  Goal: Will remain free from falls  Description: Will remain free from falls  4/21/2020 0251 by Tyree Macias RN  Outcome: Ongoing  Note: No falls this shift. Patient alert and oriented x4. Patient in bed with bed alarm on. Bed wheels locked. Bedside table in reach. Patient verbalizes and demonstrates the use of the call light. Hourly rounding being completed. Problem: Falls - Risk of:  Goal: Absence of physical injury  Description: Absence of physical injury  Outcome: Ongoing  Note: No physical injuries this shift, hourly rounding being completed, call light within reach. Problem: DAILY CARE  Goal: Daily care needs are met  4/21/2020 0251 by Tyree Macias RN  Outcome: Ongoing  Note: Patient daily care needs have been met throughout shift. Problem: DISCHARGE BARRIERS  Goal: Patient's continuum of care needs are met  4/21/2020 0251 by Tyree Macias RN  Outcome: Ongoing  Note: Patient plans to go home with family and possible home health at discharge. Problem: Metabolic:  Goal: Ability to maintain appropriate glucose levels will improve  Description: Ability to maintain appropriate glucose levels will improve  4/21/2020 0251 by Tyree Macias RN  Outcome: Ongoing  Note: Monitoring blood sugar, insulin per order. Chem 223       Problem: Skin Integrity:  Goal: Absence of new skin breakdown  Description: Absence of new skin breakdown  4/21/2020 0251 by Tyree Macias RN  Outcome: Ongoing  Note: No new skin issues this shift. Problem: Pain Control  Goal: Maintain pain level at or below patient's acceptable level (or 5 if patient is unable to determine acceptable level)  4/21/2020 0251 by Tyree Macias RN  Outcome: Ongoing  Note: Pain Assessment: 0-10  Pain Level: 0   Patient's Stated Pain Goal: No pain   Is pain goal met at this time?   Yes             Problem: Physical Regulation:  Goal: Complications related to the disease process, condition or treatment will be avoided or minimized  Description: Complications related to the disease process, condition or treatment will be avoided or minimized  4/21/2020 0251 by Justyna Lopez RN  Outcome: Ongoing  Note: Monitoring labs, IV lasix TID. Care plan reviewed with patient. Patient verbalize understanding of the plan of care and contribute to goal setting.

## 2020-04-21 NOTE — CONSULTS
[facility]    Patient - Brittney Moeller   MRN -  [id number]   [facility]   - 1955      Date of Admission -  2020  1:47 PM  Date of evaluation -  2020  Room - [facility] Day - 0538 Jasper Kong MD Primary Care Physician - REI López - CNP     Problem List      Active Hospital Problems    Diagnosis Date Noted    Acute diastolic (congestive) heart failure (HCC) [I50.31]     Nocturnal hypoxia [G47.34]     Anasarca [R60.1]     Abnormal thyroid function test [R94.6]     Type 2 diabetes mellitus with other specified complication (HCC) [P61.80]     Anxiety and depression [F41.9, F32.9]     Family history of tobacco use [Z81.2]     Morbid obesity ([address] [E66.01]     Excoriation of lower leg, sequela [S80.819S]     Iron deficiency anemia [D50.9]     Symptomatic anemia [D64.9] 2020    Microcytic anemia [D50.9] 2020     Reason for Consult    For evaluation of sleep apnea. HPI   History Obtained From: Patient and electronic medical record. Brittney Moeller is a 62 y.o. male was admitted under hospitalist service. Pulmonary and Sleep medicine was consulted for further evaluation of sleep apnea due to nocturnal hypoxia noted during a nocturna pulse ox study done last night I.e 20 to 20. He was found to have SpO2 <89%: 4hours 29minutes on room air. Sleep/Wake schedule:  Usual time to go to bed during the work/regular day of week: 12:00 AM.  Usual time to wake up during the work//regular day of week: 10:00 AM.  Over the weekends his sleep schedule: [x] Remain same. He usually falls a sleep in less than: 20 minutes. He takes naps: No.      Sleep Hygiene:  Is the temperature and evironment in his bed room is acceptable to him: Yes. He watches Television in his bed room: Yes. He read books, study, pay bills etc in the bed: Yes.   Frequency cervical adenopathy. Psychiatric: Patient  has a normal mood and affect. Skin - No bruising or bleeding. Labs  - Old records and notes have been reviewed in [facility]   ABG  Lab Results   Component Value Date    PH 7.44 03/08/2018    PO2 94 03/08/2018    PCO2 26 03/08/2018    HCO3 17 03/08/2018    O2SAT 98 03/08/2018     Lab Results   Component Value Date    IFIO2 25 03/08/2018    MODE AC 03/08/2018    SETTIDVOL 550 03/08/2018    SETPEEP 5.0 03/08/2018     CBC  Recent Labs     04/19/20  0322 04/20/20  0348 04/21/20  0336   WBC 8.7 7.9 7.3   RBC 4.46* 4.38* 4.53*   HGB 8.0* 8.1* 8.4*   HCT 31.2* 30.8* 32.0*   MCV 70.0* 70.3* 70.6*   MCH 17.9* 18.5* 18.5*   MCHC 25.6* 26.3* 26.3*    216 210   MPV ---- 10.7  --       BMP  Recent Labs     04/20/20  0348 04/20/20  1545 04/21/20  0838    138 137   K 4.0 4.3 3.9    102 99   CO2 21* 25 24   BUN 12 12 12   CREATININE 1.0 1.1 1.1   GLUCOSE 182* 201* 207*   MG 1.8  --   --    CALCIUM 8.5 8.9 8.6     LFT  No results for input(s): AST, ALT, ALB, BILITOT, ALKPHOS, LIPASE in the last 72 hours. Invalid input(s): AMYLASE  TROP  Lab Results   Component Value Date    TROPONINT < 0.010 04/17/2020    TROPONINT < 0.010 03/07/2018     BNP  No results for input(s): BNP in the last 72 hours. Lactic Acid  No results for input(s): LACTA in the last 72 hours. INR  No results for input(s): INR, PROTIME in the last 72 hours. PTT  No results for input(s): APTT in the last 72 hours. Glucose  Recent Labs     04/20/20  1944 04/21/20  0607 04/21/20  1050   POCGLU 223* 180* 196*     UA No results for input(s): SPECGRAV, PHUR, COLORU, CLARITYU, MUCUS, PROTEINU, BLOODU, RBCUA, WBCUA, BACTERIA, NITRU, GLUCOSEU, BILIRUBINUR, UROBILINOGEN, KETUA, LABCAST, LABCASTTY, AMORPHOS in the last 72 hours. Invalid input(s): CRYSTALS.     PFTs   None in [facility]    Sleep studies   None in Epic    Cultures    Blood cultures X 1 set : Negative   Urine for legionella and streptococcal

## 2020-04-21 NOTE — PROGRESS NOTES
Gastroenterology Progress Note:     Patient Name:  Carina Arevalo   MRN: [id number]  [id number]  YOB: 1955  Admit Date: 4/17/2020  1:47 PM  Primary Care Physician: REI Ruiz - CNP   3B-22/022-A     Patient seen and examined. 24 hours events and chart reviewed. Subjective: Patient resting in bed. Denies abdominal pain, nausea, & vomiting. No bowel movements overnight or thus far today. Hgb 8.4 He is being transferred to a medical/surgical unit. Objective:  BP (!) 121/58   Pulse 68   Temp 98.9 °F (37.2 °C) (Oral)   Resp 18   Ht 5' 10\" (1.778 m)   Wt (!) 356 lb 0.7 oz (161.5 kg)   SpO2 92%   BMI 51.09 kg/m²     Physical Exam:    General:  Nourished in no distress  HEENT: Atraumatic, normocephalic. Moist oral mucous membranes. Neck: Supple without adenopathy, JVD, thyromegaly or masses. Trachea midline. CV: Heart RRR, no murmurs, rubs, gallops. Resp: Even, easy without cough or accessory use. Lungs clear to ascultation bilaterally. Abd: Round, soft, obese, nontender. No hepatosplenomegaly or mass present. Active bowel sounds heard. No distention noted. Ext:  Without cyanosis, clubbing. 1+ edema. Skin: Pink, warm, dry  Neuro:  Alert, oriented x 3 with no obvious deficits.        Rectal: deferred    Labs:   CBC:   Lab Results   Component Value Date    WBC 7.3 04/21/2020    HGB 8.4 04/21/2020    HCT 32.0 04/21/2020    MCV 70.6 04/21/2020     04/21/2020     BMP:   Lab Results   Component Value Date     04/21/2020    K 3.9 04/21/2020    CL 99 04/21/2020    CO2 24 04/21/2020    PHOS 3.4 03/14/2018    BUN 12 04/21/2020    CREATININE 1.1 04/21/2020    CALCIUM 8.6 04/21/2020     Lipids:   Lab Results   Component Value Date    ALKPHOS 62 04/17/2020    ALT 6 04/17/2020    AST 14 04/17/2020    BILITOT 0.5 04/17/2020    BILIDIR <0.2 03/07/2018    LABALBU 3.8 04/17/2020    AMYLASE 28 03/07/2018    LIPASE 46.8 03/07/2018     Current Meds:  Scheduled Meds:   azithromycin 500 mg Oral Daily    pantoprazole  40 mg Oral QAM AC    furosemide  40 mg Intravenous TID    potassium chloride  40 mEq Oral BID    magnesium oxide  400 mg Oral Daily    insulin glargine  20 Units Subcutaneous BID    ferrous sulfate  325 mg Oral BID WC    amLODIPine  10 mg Oral Daily    atorvastatin  80 mg Oral Daily    gabapentin  300 mg Oral TID    insulin lispro  0-12 Units Subcutaneous TID WC    insulin lispro  0-6 Units Subcutaneous Nightly    lisinopril  20 mg Oral Daily    metoprolol tartrate  25 mg Oral Daily    sertraline  25 mg Oral Daily     Assessment:  63 yo M admitted 04/17/20 dyspnea with exertion, severe anemia, and scrotal edema. He has never had an EGD or colonoscopy. Initial Hgb 6.6, received 2 units PRBC total. Received 2 doses of IV Venofer. FOBT negative. COVID test negative. He has never had an EGD or colonoscopy. 1. Severe iron deficiency anemia- s/p 2 doses IV Venofer, s/p 2 units PRBC, FOBT negative, no active GI bleeding  2. Acute CHF exacerbation  3. NOGUEIRA, improving- COVID negative  4. Scrotal edema  5. Morbid obesity  6. HTN  7. HLD  8. GERD  9. DM  10. Depression & anxiety     Plan:    1. Monitor H & H, transfuse prn  2. Nursing to monitor stool output  3. Continue iron supplementation  4. PPI daily  5. Cardiac, carb count diet  6. Will need EGD & colonoscopy outpatient once COVID restrictions are lifted, will be high risk with BMI >50  7.  Supportive care per primary team  Will follow    Case reviewed and impression/plan reviewed in collaboration with Dr. Alyssia Stephens  Electronically signed by REI Armando CNP on 4/21/2020 at 11:34 AM    [facility]

## 2020-04-21 NOTE — PROGRESS NOTES
NEGATIVE 03/09/2018    WBCUA 0-2 03/09/2018    BACTERIA NONE 03/09/2018    RBCUA 15-25 03/09/2018    BLOODU MODERATE 03/09/2018    GLUCOSEU >= 1000 03/09/2018       Diagnostic imaging/procedures       Xr Chest Portable    Result Date: 4/20/2020  PROCEDURE: XR CHEST PORTABLE CLINICAL INFORMATION: pulmonary edema. COMPARISON: Chest x-ray dated 4/17/2020 TECHNIQUE: AP Portable chest xray FINDINGS: Lines/tubes/devices: none Lungs/pleura:  No pneumonia, pulmonary edema, or obvious mass. No pleural effusion. No pneumothorax. Heart: There is borderline cardiomegaly. Mediastinum/arabella: No obvious mass or adenopathy. Skeleton: No significant bone or joint abnormality. No acute cardiopulmonary disease. Borderline cardiomegaly. **This report has been created using voice recognition software. It may contain minor errors which are inherent in voice recognition technology. ** Final report electronically signed by Dr. Galileo Self on 4/20/2020 2:10 AM    Xr Chest Portable    Result Date: 4/17/2020  PROCEDURE: XR CHEST PORTABLE CLINICAL INFORMATION: 67-year-old male with shortness of breath and chest pain. COMPARISON: Chest x-ray 3/12/2018. TECHNIQUE: 2 AP views of the chest were obtained in the upright position. FINDINGS: There is cardiomegaly. There are low lung volumes which accentuates the pulmonary vasculature. Bibasilar opacities are seen which may represent infiltrates or atelectasis. There is no significant pleural effusion or pneumothorax. Visualized portions of the upper abdomen are within normal limits. The osseous structures are intact. No acute fractures or suspicious osseous lesions. 1. Bibasilar opacities which may represent infiltrate or atelectasis. 2. Cardiomegaly. **This report has been created using voice recognition software. It may contain minor errors which are inherent in voice recognition technology. ** Final report electronically signed by Dr Odell John on 4/17/2020 2:48 PM        DVT

## 2020-04-22 LAB
ANION GAP SERPL CALCULATED.3IONS-SCNC: 13 MEQ/L (ref 8–16)
ANISOCYTOSIS: PRESENT
BASOPHILS # BLD: 1.5 %
BASOPHILS ABSOLUTE: 0.1 THOU/MM3 (ref 0–0.1)
BUN BLDV-MCNC: 14 MG/DL (ref 7–22)
CALCIUM SERPL-MCNC: 8.6 MG/DL (ref 8.5–10.5)
CHLORIDE BLD-SCNC: 101 MEQ/L (ref 98–111)
CO2: 25 MEQ/L (ref 23–33)
CREAT SERPL-MCNC: 1.1 MG/DL (ref 0.4–1.2)
EOSINOPHIL # BLD: 6.5 %
EOSINOPHILS ABSOLUTE: 0.4 THOU/MM3 (ref 0–0.4)
ERYTHROCYTE [DISTWIDTH] IN BLOOD BY AUTOMATED COUNT: 26.9 % (ref 11.5–14.5)
ERYTHROCYTE [DISTWIDTH] IN BLOOD BY AUTOMATED COUNT: 63 FL (ref 35–45)
GFR SERPL CREATININE-BSD FRML MDRD: 67 ML/MIN/1.73M2
GLUCOSE BLD-MCNC: 125 MG/DL (ref 70–108)
GLUCOSE BLD-MCNC: 134 MG/DL (ref 70–108)
GLUCOSE BLD-MCNC: 152 MG/DL (ref 70–108)
GLUCOSE BLD-MCNC: 160 MG/DL (ref 70–108)
GLUCOSE BLD-MCNC: 176 MG/DL (ref 70–108)
HCT VFR BLD CALC: 34.7 % (ref 42–52)
HEMOGLOBIN: 9 GM/DL (ref 14–18)
HYPOCHROMIA: PRESENT
IMMATURE GRANS (ABS): 0.02 THOU/MM3 (ref 0–0.07)
IMMATURE GRANULOCYTES: 0.3 %
LYMPHOCYTES # BLD: 19.3 %
LYMPHOCYTES ABSOLUTE: 1.1 THOU/MM3 (ref 1–4.8)
MAGNESIUM: 1.9 MG/DL (ref 1.6–2.4)
MCH RBC QN AUTO: 18.8 PG (ref 26–33)
MCHC RBC AUTO-ENTMCNC: 25.9 GM/DL (ref 32.2–35.5)
MCV RBC AUTO: 72.6 FL (ref 80–94)
MONOCYTES # BLD: 14.1 %
MONOCYTES ABSOLUTE: 0.8 THOU/MM3 (ref 0.4–1.3)
NUCLEATED RED BLOOD CELLS: 0 /100 WBC
PLATELET # BLD: 189 THOU/MM3 (ref 130–400)
PMV BLD AUTO: ABNORMAL FL (ref 9.4–12.4)
POTASSIUM SERPL-SCNC: 4.1 MEQ/L (ref 3.5–5.2)
RBC # BLD: 4.78 MILL/MM3 (ref 4.7–6.1)
SEG NEUTROPHILS: 58.3 %
SEGMENTED NEUTROPHILS ABSOLUTE COUNT: 3.4 THOU/MM3 (ref 1.8–7.7)
SODIUM BLD-SCNC: 139 MEQ/L (ref 135–145)
WBC # BLD: 5.9 THOU/MM3 (ref 4.8–10.8)

## 2020-04-22 PROCEDURE — 6360000002 HC RX W HCPCS: Performed by: INTERNAL MEDICINE

## 2020-04-22 PROCEDURE — 99232 SBSQ HOSP IP/OBS MODERATE 35: CPT | Performed by: INTERNAL MEDICINE

## 2020-04-22 PROCEDURE — 94660 CPAP INITIATION&MGMT: CPT

## 2020-04-22 PROCEDURE — 6370000000 HC RX 637 (ALT 250 FOR IP): Performed by: INTERNAL MEDICINE

## 2020-04-22 PROCEDURE — 99232 SBSQ HOSP IP/OBS MODERATE 35: CPT | Performed by: NURSE PRACTITIONER

## 2020-04-22 PROCEDURE — 6370000000 HC RX 637 (ALT 250 FOR IP): Performed by: FAMILY MEDICINE

## 2020-04-22 PROCEDURE — 6370000000 HC RX 637 (ALT 250 FOR IP): Performed by: NURSE PRACTITIONER

## 2020-04-22 PROCEDURE — 82948 REAGENT STRIP/BLOOD GLUCOSE: CPT

## 2020-04-22 PROCEDURE — 94760 N-INVAS EAR/PLS OXIMETRY 1: CPT

## 2020-04-22 PROCEDURE — 97530 THERAPEUTIC ACTIVITIES: CPT

## 2020-04-22 PROCEDURE — 83735 ASSAY OF MAGNESIUM: CPT

## 2020-04-22 PROCEDURE — 36415 COLL VENOUS BLD VENIPUNCTURE: CPT

## 2020-04-22 PROCEDURE — 97162 PT EVAL MOD COMPLEX 30 MIN: CPT

## 2020-04-22 PROCEDURE — APPSS30 APP SPLIT SHARED TIME 16-30 MINUTES: Performed by: NURSE PRACTITIONER

## 2020-04-22 PROCEDURE — 85025 COMPLETE CBC W/AUTO DIFF WBC: CPT

## 2020-04-22 PROCEDURE — 1200000003 HC TELEMETRY R&B

## 2020-04-22 PROCEDURE — 80048 BASIC METABOLIC PNL TOTAL CA: CPT

## 2020-04-22 RX ADMIN — FERROUS SULFATE TAB 325 MG (65 MG ELEMENTAL FE) 325 MG: 325 (65 FE) TAB at 09:25

## 2020-04-22 RX ADMIN — INSULIN GLARGINE 20 UNITS: 100 INJECTION, SOLUTION SUBCUTANEOUS at 09:32

## 2020-04-22 RX ADMIN — MAGNESIUM GLUCONATE 500 MG ORAL TABLET 400 MG: 500 TABLET ORAL at 09:26

## 2020-04-22 RX ADMIN — GABAPENTIN 300 MG: 300 CAPSULE ORAL at 19:56

## 2020-04-22 RX ADMIN — PANTOPRAZOLE SODIUM 40 MG: 40 TABLET, DELAYED RELEASE ORAL at 06:05

## 2020-04-22 RX ADMIN — INSULIN LISPRO 2 UNITS: 100 INJECTION, SOLUTION INTRAVENOUS; SUBCUTANEOUS at 09:30

## 2020-04-22 RX ADMIN — AZITHROMYCIN 500 MG: 250 TABLET, FILM COATED ORAL at 09:25

## 2020-04-22 RX ADMIN — POTASSIUM CHLORIDE 40 MEQ: 1500 TABLET, EXTENDED RELEASE ORAL at 09:26

## 2020-04-22 RX ADMIN — AMLODIPINE BESYLATE 10 MG: 10 TABLET ORAL at 09:25

## 2020-04-22 RX ADMIN — ATORVASTATIN CALCIUM 80 MG: 80 TABLET, FILM COATED ORAL at 09:25

## 2020-04-22 RX ADMIN — LISINOPRIL 20 MG: 20 TABLET ORAL at 09:26

## 2020-04-22 RX ADMIN — FERROUS SULFATE TAB 325 MG (65 MG ELEMENTAL FE) 325 MG: 325 (65 FE) TAB at 18:00

## 2020-04-22 RX ADMIN — SERTRALINE 25 MG: 50 TABLET, FILM COATED ORAL at 09:26

## 2020-04-22 RX ADMIN — INSULIN LISPRO 2 UNITS: 100 INJECTION, SOLUTION INTRAVENOUS; SUBCUTANEOUS at 13:31

## 2020-04-22 RX ADMIN — METOPROLOL TARTRATE 25 MG: 25 TABLET ORAL at 09:26

## 2020-04-22 RX ADMIN — POTASSIUM CHLORIDE 40 MEQ: 1500 TABLET, EXTENDED RELEASE ORAL at 19:56

## 2020-04-22 RX ADMIN — GABAPENTIN 300 MG: 300 CAPSULE ORAL at 09:26

## 2020-04-22 RX ADMIN — FUROSEMIDE 40 MG: 10 INJECTION, SOLUTION INTRAMUSCULAR; INTRAVENOUS at 09:27

## 2020-04-22 RX ADMIN — GABAPENTIN 300 MG: 300 CAPSULE ORAL at 13:33

## 2020-04-22 ASSESSMENT — PAIN SCALES - GENERAL
PAINLEVEL_OUTOF10: 0

## 2020-04-22 NOTE — PLAN OF CARE
avoided or minimized  Outcome: Ongoing  Note: Vital signs stable. Repeat labs in the morning. Will continue to monitor. Care plan reviewed with patient. Patient and verbalizes understanding of the plan of care and contribute to goal setting.

## 2020-04-22 NOTE — PROGRESS NOTES
Hospitalist Progress Note    Patient:  Justin Razo      Unit/Bed:8B-36/036-A    YOB: 1955    MRN: [id number]       Acct: [de-identified]     PCP: REI Zamarripa CNP    Date of Admission: 4/17/2020    Assessment/Plan:    1. Dyspnea--likely 2nd to #2, #3  2. Symptomatic anemia/ iron deficiency microcytic anemia--has received 2 units PRBC's; Vitamin B12, folate unremarkable; iron studies indicate iron deficiency anemia; FOB (-); appreciate GI input; plan to perform EGD & colonoscopy outpatient once COVID restrictions are lifted; S/P IV iron infusion and now on ferrous sulfate; stable; was taking Indocin at home along with ASA  3. Acute on chronic diastolic HF with anasarca--no accurate I/O's documented; weight is down 36#; HF clinic referral; on Lasix 40 mg daily; on BB, ACE-I; echo wth EF 55-60%   4. Essential HTN, uncontrolled--BB, ACE-I, CCB; monitor  5. HLP--on atorvastatin  6. DM-2, controlled with peripheral neuropathy--AIC at 6.1 on 4/17/2020; SSI #2, Lantus; on gabapentin; at home takes  Humalog 20 units TID, Nesina 25 mg daily, Glucophage 500 mg BID; resume pre meal bolus as sugars 180-258 past 24 hours  7. GERD--Protonix  8. Chronic depression and anxiety--Zoloft and Vistaril  9. History of tobacco dependence  10. Morbid obesity with BMI 51.2  11. Lower extremity excoriations related to cat scratches--Zithromax 4/18~today will be dose #5  12. Probable BALTA--evidence for possible obstructive sleep apnea as patient spent 4 hours and 29 minutes with O2 sats <89% on night of 4/20; appreciate pulmonology input; was started on CPAP 12 cm H20; needs outpt sleep study  13. Possible Obesity Hypoventilation Syndrome--no ABG's to assess C02; BMI at 51.2  14.  Physical deconditioning--add PT and OT to evaluate     Expected discharge date:  1-2 days    Disposition:    [x] Home       [] TCU       [] Rehab       [] Psych       [] SNF       [] [place]       [] Other-    Chief Complaint: shortness of breath    Hospital Course: per Dr. Khushi Staton H&P from 4/17: \" 61 y/o M PMHx HTN, HLD, IDDM2 with associated neuropathy, GERD, gout, depression and former tobacco abuse -- presents to [facility] with a chief complaint of shortness of breath. History provided by the patient.     Patient endorses 1-month hx progressive shortness of breath with worsening BLE edema. He also admits to recent, new-onset bilateral scrotal edema without associated tenderness or urinary symptoms. No precipitating/alleviating factors identified. Denies fevers/chills, cough, chest pain, palpitations, nausea/emesis, abdominal pain, diarrhea, weakness or syncope. He also denies recent illness or sick contacts. States he was reluctant to come to the hospital when his symptoms first started due to [place] pandemic. Denies known history of CHF, but states he was diagnosed with 2 heart attacks many years ago in [place] (cannot recall name of hospital or physician). Of note, he currently sleeps upright in a hospital bed at home.     No additional questions / concerns identified at this time.     ED/early hospital course: afebrile, tachypnic (30s), but otherwise hemodynamically appropriate with SpO2 95% on RA. Workup remarkable for: microcytic anemia (hgb 6.6 / MCV 67.2; iron 11, ferritin 10, TIBC 319, Retic Ct nml, B12/folate nml), WBC/Plt nml, , CO2: 19 (AG 15), Cr 1.0 / BUN 15 / eGFR 75; CMP otherwise unremarkable. Trop negative. Pro-BNP 2800. TSH 4.82. Procal negative. Hemoglobin A1C 6.1. FOBT negative. CXR: bibasilar opacities -- infiltrate vs atelectasis / cardiomegaly. No EKG obtained. Patient was started on empiric antibiotics (CTX/doxy) for concern re: CAP and given 200 mg IV venofer. COVID testing obtained / negative. \"    4/22--> patient has been seen by GI along with pulmonology; he lives with family; his BMP is normal today; hemoglobin is up to 9.0 from 8.4 yesterday; he tolerated his CPAP well in the night; will

## 2020-04-22 NOTE — PROGRESS NOTES
[address] [address]  INPATIENT PHYSICAL THERAPY  EVALUATION  [facility] MED SURG 8B - 8B-36/036-A    Time In: 5053  Time Out: 5389  Timed Code Treatment Minutes: 23 Minutes  Minutes: 35          Date: 2020  Patient Name: Alban Interiano,  Gender:  male        MRN: [id number]  : 1955  (62 y.o.)      Referring Practitioner: MARINA Gavin  Diagnosis: acute anemia  Additional Pertinent Hx: Per EMR: Sukhjinder Jacobs is a 62 y.o. male who presents for evaluation of swollen testicles that had been constant for the last month, rating his current pain 6/10 in severity. Patient states that he has not been evaluated for his swollen testicles prior to evaluation today. Patient denies drainage from his testicles and penis. He is also reporting shortness of breath most noted with short distances of exertion. Patient states that his shortness of breath has been persistent since the beginning of April. Patient reports a history of pneumonia that occurred a year ago, and states that his shortness of breath is similar to his past experience. Patient also notes recent weight gain, but is unsure of the exact amount. Patient denies fever and chills. He denies cough, sore throat, and chest pain. He reports a history of 2 MI, but denies a known history of CHF. Patient reports his older brother passed away from CHF. He denies having a cardiologist in the area to manage his cardiac history. Patient states that he currently resides in the same household with multiple family members. Patient has a past medical history that includes HTN, HLD, Type II DM, Sepsis, and GERD. \"     Restrictions/Precautions:  Restrictions/Precautions: Fall Risk    Subjective:  Chart Reviewed: Yes  Patient assessed for rehabilitation services?: Yes  Family / Caregiver Present: No  Subjective: RN approved PT evaluation. Pt in supine upon entry and was agreeable to PT interventions. Post session, pt in bedside chair with all needs inr each.  RN aware. Chair alarm on. General:  Overall Orientation Status: Within Functional Limits  Follows Commands: Within Functional Limits    Vision: Within Functional Limits    Hearing: Within functional limits         Pain:  Denies. Social/Functional History:    Lives With: Family  Type of Home: Trailer  Home Layout: One level  Home Access: Ramped entrance  Home Equipment: Rolling walker, Wheelchair-manual                   Ambulation Assistance: Independent(stand pivot)  Transfer Assistance: Independent          Additional Comments: Pt reports that prior to admission he was living with his twin brother and his brothers sig other whom assists him with emptying his [facility] bucket. Pt notes limited mobility PTA secondary to lack of \"motivation\". He discussed that him and his brother have been discussing becoming more mobile and eating healthy. OBJECTIVE:  Range of Motion:  Bilateral Lower Extremity: WFL    Strength:  Bilateral Lower Extremity: Impaired - hip strength: grossly 4/5, distal LE: WFL    Balance:  Static Standing Balance: Stand By Assistance, Contact Guard Assistance  Dynamic Standing Balance: Contact Guard Assistance    Bed Mobility:  Rolling to Left: Stand By Assistance, with head of bed raised, with verbal cues , with increased time for completion   Supine to Sit: Stand By Assistance, with head of bed raised, with verbal cues     Transfers:  Sit to Stand: Stand By Assistance, [address], with increased time for completion, cues for hand placement, to/from chair with arms  Stand to [facility] [address], with increased time for completion, cues for hand placement, to/from chair with arms    Ambulation:  Contact Guard Assistance, with cues for safety, with verbal cues , with increased time for completion  Distance: from EOB to bedside chair  Surface: Level Tile  Device:Rolling Walker  Gait Deviations:   Forward Flexed Posture, Slow Farideh and wide RANDY, mildly unsteady, no LOB,

## 2020-04-22 NOTE — PROGRESS NOTES
40 mg Oral QAM AC    potassium chloride  40 mEq Oral BID    magnesium oxide  400 mg Oral Daily    insulin glargine  20 Units Subcutaneous BID    ferrous sulfate  325 mg Oral BID WC    amLODIPine  10 mg Oral Daily    atorvastatin  80 mg Oral Daily    gabapentin  300 mg Oral TID    insulin lispro  0-12 Units Subcutaneous TID WC    insulin lispro  0-6 Units Subcutaneous Nightly    lisinopril  20 mg Oral Daily    metoprolol tartrate  25 mg Oral Daily    sertraline  25 mg Oral Daily     Assessment:  63 yo M admitted 04/17/20 dyspnea with exertion, severe anemia, and scrotal edema. He has never had an EGD or colonoscopy. Initial Hgb 6.6, received 2 units PRBC total. Received 2 doses of IV Venofer. FOBT negative. COVID test negative. He has never had an EGD or colonoscopy. 1. Severe iron deficiency anemia- s/p 2 doses IV Venofer, s/p 2 units PRBC, FOBT negative, no active GI bleeding  2. Acute CHF exacerbation  3. NOGUEIRA, improving- COVID negative  4. Scrotal edema  5. Morbid obesity  6. HTN  7. HLD  8. GERD  9. DM  10. Depression & anxiety      Plan:    1. Monitor H & H, transfuse prn  2. Nursing to monitor stool output  3. Continue iron supplementation  4. PPI daily  5. Cardiac, carb count diet  6. Will need EGD & colonoscopy outpatient once COVID restrictions are lifted, will be high risk with BMI >50  7.  Supportive care per primary team  Will follow    Case reviewed and impression/plan reviewed in collaboration with Dr. Richi El  Electronically signed by REI Grimes CNP on 4/22/2020 at 2:42 PM    [facility]

## 2020-04-22 NOTE — ADT AUTH CERT
studies indicate iron deficiency anemia. FOBT negative. Greg Charli consulted for further diagnostic studies and plan to perform these as an outpatient.  Echocardiogram showed EF of 55-60%. The patient states that he has sustained cat scratches recently further evaluation for hemolytic anemia pending. LDH slightly elevated. Haptoglobin pending- on azithromycin.  S/p IV iron now on ferrous sulfate. Continue Furosemide 40 mg IV tid   2. Anasarca with acute diastolic congestive heart failure: has scrotal edema, upper and lower bilateral edema that is thought to have been precipitated by anemia. Hold off on scrotal US unless scrotal edema persists after diuresis. Continue IV diuresis today and recheck BMP @ 1400. Heart failure clinic referral. There is evidence for possible obstructive sleep apnea as patient spent 4 hours and 29 minutes with O2 sats <89% on night of 4/20. Pulmonology consulted   3. Dyspnea thought to be related to a combination of symptomatic anemia and acute congestive heart failure:  Likely to improve with continued diuresis and blood transfusions. Will continue to monitor.  Doubt pneumonia- procalcitonin unremarkable and patient denied cough or chest pain,  therefore Ceftriaxone and doxycycline discontinued. 4. Nocturnal hypoxia: Pulmonology consulted. Evidence for obstructive sleep apnea. Will need Home O2 at night on discharge. 2L of oxygen orderd at night   5. COVID-19 negative. 6. Abnormal thyroid function tests: TSH 4.820. Recommended recheck TSH and free T4 in 6 weeks   7. Essential HTN   8. HLD: on atorvastatin   9. DM Type 2 complicated by peripheral neuropathy: ISS on gabapentin   10. GERD: Protonix   11. Chronic depression and anxiety: Zoloft and Vistaril   12. History of tobacco dependence   13. Morbid obesity: BMI 56.4   14. Lower extremity excoriations related to cat scratches: On azithromycin           Anticipated Discharge in : 1 to 2 days.  Plan to transfer to [facility] today Gastroenterology   Assessment:   63 yo M admitted 04/17/20 dyspnea with exertion, severe anemia, and scrotal edema. He has never had an EGD or colonoscopy. Initial Hgb 6.6, received 2 units PRBC total. Received 2 doses of IV Venofer. FOBT negative. COVID test negative. He has never had an EGD or colonoscopy. 1. Severe iron deficiency anemia- s/p 2 doses IV Venofer, s/p 2 units PRBC, FOBT negative, no active GI bleeding   2. Acute CHF exacerbation   3. NOGUEIRA, improving- COVID negative   4. Scrotal edema   5. Morbid obesity   6. HTN   7. HLD   8. GERD   9. DM   10. Depression & anxiety        Plan:     1. Monitor H & H, transfuse prn   2. Nursing to monitor stool output   3. Continue iron supplementation   4. PPI daily   5. Cardiac, carb count diet   6. Will need EGD & colonoscopy outpatient once COVID restrictions are lifted, will be high risk with BMI >50   7. Supportive care per primary team   Will follow      [facility]   Assessment   -Snoring, frequent nocturnal awakenings and excessive daytime sleepiness- High suspicion for sleep apnea. -Hypersomnia ( Excessive daytime sleepiness)may be due to obstructive sleep apnea  Vs Inadequate sleep hygiene.   -Nocturnal hypoxia- most likely due to obstructive sleep apnea Vs CHF. -Inadequate sleep hygiene. -Morbid Obesity.   -Chronic diastolic CHF with HFpEF.       Plan   -Will start patient on CPAP with pressure of 12cm H20 during sleep and daytime naps. -Will schedule Franca Penn for nocturnal polysomnogram (Sleep study) with split night protocol at [address] [place] [address] sleep lab after discharge from the current hospitalization.  Patient to follow with Ms. Maria Isabel lassiter ( Dr. Noah Vazquez) sleep clinic at [address] [facility], [address] for Pulmonary disease in 6 to 8 weeks after the sleep study with CPAP down load  to go over the study reports and down load review.    -Need optimization of his CHF by primary service.   -He was educated to

## 2020-04-22 NOTE — PROGRESS NOTES
 TONSILLECTOMY       Meds    Current Medications    insulin lispro  10 Units Subcutaneous TID WC    furosemide  40 mg Intravenous Daily    pantoprazole  40 mg Oral QAM AC    potassium chloride  40 mEq Oral BID    magnesium oxide  400 mg Oral Daily    insulin glargine  20 Units Subcutaneous BID    ferrous sulfate  325 mg Oral BID WC    amLODIPine  10 mg Oral Daily    atorvastatin  80 mg Oral Daily    gabapentin  300 mg Oral TID    insulin lispro  0-12 Units Subcutaneous TID WC    insulin lispro  0-6 Units Subcutaneous Nightly    lisinopril  20 mg Oral Daily    metoprolol tartrate  25 mg Oral Daily    sertraline  25 mg Oral Daily     perflutren lipid microspheres, glucose, dextrose, glucagon (rDNA), dextrose  IV Drips/Infusions   dextrose       Home Medications  Medications Prior to Admission: insulin glargine (BASAGLAR KWIKPEN) 100 UNIT/ML injection pen, Inject 40 Units into the skin 2 times daily  insulin lispro (HUMALOG KWIKPEN) 100 UNIT/ML pen, Inject 20 Units into the skin 3 times daily (with meals)  alogliptin (NESINA) 25 MG TABS tablet, Take 25 mg by mouth daily  indomethacin (INDOCIN) 50 MG capsule, Take 50 mg by mouth 3 times daily as needed (for gout)   atorvastatin (LIPITOR) 80 MG tablet, Take 80 mg by mouth daily  metFORMIN (GLUCOPHAGE XR) 500 MG extended release tablet, Take 500 mg by mouth 2 times daily  metoprolol tartrate (LOPRESSOR) 25 MG tablet, Take 25 mg by mouth daily  hydrOXYzine (VISTARIL) 25 MG capsule, Take 25 mg by mouth nightly as needed (for difficulty sleeping)  sertraline (ZOLOFT) 25 MG tablet, Take 25 mg by mouth daily  FREESTYLE LITE strip, TEST as directed four times a day  gabapentin (NEURONTIN) 300 MG capsule, take 1 capsule by mouth three times a day  lansoprazole (PREVACID) 30 MG capsule, take 1 capsule by mouth once daily  amLODIPine (NORVASC) 10 MG tablet, Take 1 tablet by mouth daily. aspirin EC 81 MG EC tablet, Take 1 tablet by mouth daily.   Lancets MISC, 7.44 03/08/2018    PO2 94 03/08/2018    PCO2 26 03/08/2018    HCO3 17 03/08/2018    O2SAT 98 03/08/2018     Lab Results   Component Value Date    IFIO2 25 03/08/2018    MODE AC 03/08/2018    SETTIDVOL 550 03/08/2018    SETPEEP 5.0 03/08/2018     CBC  Recent Labs     04/20/20  0348 04/21/20  0336 04/22/20  0413   WBC 7.9 7.3 5.9   RBC 4.38* 4.53* 4.78   HGB 8.1* 8.4* 9.0*   HCT 30.8* 32.0* 34.7*   MCV 70.3* 70.6* 72.6*   MCH 18.5* 18.5* 18.8*   MCHC 26.3* 26.3* 25.9*    210 189   MPV 10.7  --  ----      BMP  Recent Labs     04/20/20  0348  04/21/20  0838 04/21/20  1452 04/22/20  0413      < > 137 136 139   K 4.0   < > 3.9 4.3 4.1      < > 99 98 101   CO2 21*   < > 24 26 25   BUN 12   < > 12 14 14   CREATININE 1.0   < > 1.1 1.3* 1.1   GLUCOSE 182*   < > 207* 255* 176*   MG 1.8  --   --   --  1.9   CALCIUM 8.5   < > 8.6 8.6 8.6    < > = values in this interval not displayed. LFT  No results for input(s): AST, ALT, ALB, BILITOT, ALKPHOS, LIPASE in the last 72 hours. Invalid input(s): AMYLASE  TROP  Lab Results   Component Value Date    TROPONINT < 0.010 04/17/2020    TROPONINT < 0.010 03/07/2018     BNP  No results for input(s): BNP in the last 72 hours. Lactic Acid  No results for input(s): LACTA in the last 72 hours. INR  No results for input(s): INR, PROTIME in the last 72 hours. PTT  No results for input(s): APTT in the last 72 hours. Glucose  Recent Labs     04/21/20  1705 04/21/20  2150 04/22/20  0822   POCGLU 234* 258* 160*     UA No results for input(s): SPECGRAV, PHUR, COLORU, CLARITYU, MUCUS, PROTEINU, BLOODU, RBCUA, WBCUA, BACTERIA, NITRU, GLUCOSEU, BILIRUBINUR, UROBILINOGEN, KETUA, LABCAST, LABCASTTY, AMORPHOS in the last 72 hours. Invalid input(s): CRYSTALS. PFTs   None in [facility]    Sleep studies   None in Epic    Cultures    Blood cultures X 1 set : Negative   Urine for legionella and streptococcal antigens were:Negative.     Echocardiogram     Right Ventricle   The right

## 2020-04-23 VITALS
OXYGEN SATURATION: 93 % | WEIGHT: 315 LBS | DIASTOLIC BLOOD PRESSURE: 55 MMHG | SYSTOLIC BLOOD PRESSURE: 123 MMHG | RESPIRATION RATE: 20 BRPM | TEMPERATURE: 98.3 F | HEART RATE: 77 BPM | BODY MASS INDEX: 45.1 KG/M2 | HEIGHT: 70 IN

## 2020-04-23 LAB
ANION GAP SERPL CALCULATED.3IONS-SCNC: 12 MEQ/L (ref 8–16)
ANISOCYTOSIS: PRESENT
BASOPHILS # BLD: 1.1 %
BASOPHILS ABSOLUTE: 0.1 THOU/MM3 (ref 0–0.1)
BLOOD CULTURE, ROUTINE: NORMAL
BUN BLDV-MCNC: 15 MG/DL (ref 7–22)
CALCIUM SERPL-MCNC: 8.4 MG/DL (ref 8.5–10.5)
CHLORIDE BLD-SCNC: 102 MEQ/L (ref 98–111)
CO2: 25 MEQ/L (ref 23–33)
CREAT SERPL-MCNC: 1 MG/DL (ref 0.4–1.2)
EOSINOPHIL # BLD: 6.5 %
EOSINOPHILS ABSOLUTE: 0.4 THOU/MM3 (ref 0–0.4)
ERYTHROCYTE [DISTWIDTH] IN BLOOD BY AUTOMATED COUNT: 27.6 % (ref 11.5–14.5)
ERYTHROCYTE [DISTWIDTH] IN BLOOD BY AUTOMATED COUNT: 67.5 FL (ref 35–45)
GFR SERPL CREATININE-BSD FRML MDRD: 75 ML/MIN/1.73M2
GLUCOSE BLD-MCNC: 119 MG/DL (ref 70–108)
GLUCOSE BLD-MCNC: 157 MG/DL (ref 70–108)
GLUCOSE BLD-MCNC: 173 MG/DL (ref 70–108)
GLUCOSE BLD-MCNC: 175 MG/DL (ref 70–108)
HAPTOGLOBIN: 209 MG/DL (ref 30–200)
HCT VFR BLD CALC: 35.3 % (ref 42–52)
HEMOGLOBIN: 9.1 GM/DL (ref 14–18)
HYPOCHROMIA: PRESENT
IMMATURE GRANS (ABS): 0.02 THOU/MM3 (ref 0–0.07)
IMMATURE GRANULOCYTES: 0.3 %
LYMPHOCYTES # BLD: 20.9 %
LYMPHOCYTES ABSOLUTE: 1.4 THOU/MM3 (ref 1–4.8)
MCH RBC QN AUTO: 18.8 PG (ref 26–33)
MCHC RBC AUTO-ENTMCNC: 25.8 GM/DL (ref 32.2–35.5)
MCV RBC AUTO: 73.1 FL (ref 80–94)
MONOCYTES # BLD: 13.7 %
MONOCYTES ABSOLUTE: 0.9 THOU/MM3 (ref 0.4–1.3)
NUCLEATED RED BLOOD CELLS: 0 /100 WBC
PLATELET # BLD: 188 THOU/MM3 (ref 130–400)
PLATELET ESTIMATE: ADEQUATE
POIKILOCYTES: ABNORMAL
POTASSIUM SERPL-SCNC: 4.5 MEQ/L (ref 3.5–5.2)
RBC # BLD: 4.83 MILL/MM3 (ref 4.7–6.1)
SCAN OF BLOOD SMEAR: NORMAL
SEG NEUTROPHILS: 57.5 %
SEGMENTED NEUTROPHILS ABSOLUTE COUNT: 3.7 THOU/MM3 (ref 1.8–7.7)
SODIUM BLD-SCNC: 139 MEQ/L (ref 135–145)
STOMATOCYTES: ABNORMAL
WBC # BLD: 6.5 THOU/MM3 (ref 4.8–10.8)

## 2020-04-23 PROCEDURE — 97116 GAIT TRAINING THERAPY: CPT

## 2020-04-23 PROCEDURE — 6370000000 HC RX 637 (ALT 250 FOR IP): Performed by: INTERNAL MEDICINE

## 2020-04-23 PROCEDURE — 6370000000 HC RX 637 (ALT 250 FOR IP): Performed by: FAMILY MEDICINE

## 2020-04-23 PROCEDURE — 94660 CPAP INITIATION&MGMT: CPT

## 2020-04-23 PROCEDURE — 99232 SBSQ HOSP IP/OBS MODERATE 35: CPT | Performed by: INTERNAL MEDICINE

## 2020-04-23 PROCEDURE — 97110 THERAPEUTIC EXERCISES: CPT

## 2020-04-23 PROCEDURE — 99239 HOSP IP/OBS DSCHRG MGMT >30: CPT | Performed by: NURSE PRACTITIONER

## 2020-04-23 PROCEDURE — 94761 N-INVAS EAR/PLS OXIMETRY MLT: CPT

## 2020-04-23 PROCEDURE — 6370000000 HC RX 637 (ALT 250 FOR IP): Performed by: NURSE PRACTITIONER

## 2020-04-23 PROCEDURE — APPSS30 APP SPLIT SHARED TIME 16-30 MINUTES: Performed by: NURSE PRACTITIONER

## 2020-04-23 PROCEDURE — 97530 THERAPEUTIC ACTIVITIES: CPT

## 2020-04-23 PROCEDURE — 80048 BASIC METABOLIC PNL TOTAL CA: CPT

## 2020-04-23 PROCEDURE — 36415 COLL VENOUS BLD VENIPUNCTURE: CPT

## 2020-04-23 PROCEDURE — 6360000002 HC RX W HCPCS: Performed by: INTERNAL MEDICINE

## 2020-04-23 PROCEDURE — 85025 COMPLETE CBC W/AUTO DIFF WBC: CPT

## 2020-04-23 PROCEDURE — 82948 REAGENT STRIP/BLOOD GLUCOSE: CPT

## 2020-04-23 RX ORDER — FUROSEMIDE 40 MG/1
40 TABLET ORAL DAILY
Status: DISCONTINUED | OUTPATIENT
Start: 2020-04-23 | End: 2020-04-23 | Stop reason: HOSPADM

## 2020-04-23 RX ORDER — POTASSIUM CHLORIDE 20 MEQ/1
20 TABLET, EXTENDED RELEASE ORAL DAILY
Qty: 60 TABLET | Refills: 3 | Status: SHIPPED | OUTPATIENT
Start: 2020-04-23

## 2020-04-23 RX ORDER — INSULIN GLARGINE 100 [IU]/ML
20 INJECTION, SOLUTION SUBCUTANEOUS 2 TIMES DAILY
Qty: 1 VIAL | Refills: 3 | Status: SHIPPED | OUTPATIENT
Start: 2020-04-23

## 2020-04-23 RX ORDER — FERROUS SULFATE 325(65) MG
325 TABLET ORAL 2 TIMES DAILY WITH MEALS
Qty: 30 TABLET | Refills: 3 | Status: SHIPPED | OUTPATIENT
Start: 2020-04-23

## 2020-04-23 RX ORDER — PANTOPRAZOLE SODIUM 40 MG/1
40 TABLET, DELAYED RELEASE ORAL
Qty: 30 TABLET | Refills: 0 | Status: SHIPPED | OUTPATIENT
Start: 2020-04-23 | End: 2020-05-21

## 2020-04-23 RX ORDER — FUROSEMIDE 40 MG/1
40 TABLET ORAL DAILY
Qty: 30 TABLET | Refills: 0 | Status: SHIPPED | OUTPATIENT
Start: 2020-04-23 | End: 2021-04-23

## 2020-04-23 RX ADMIN — INSULIN LISPRO 2 UNITS: 100 INJECTION, SOLUTION INTRAVENOUS; SUBCUTANEOUS at 10:04

## 2020-04-23 RX ADMIN — GABAPENTIN 300 MG: 300 CAPSULE ORAL at 09:55

## 2020-04-23 RX ADMIN — FERROUS SULFATE TAB 325 MG (65 MG ELEMENTAL FE) 325 MG: 325 (65 FE) TAB at 15:57

## 2020-04-23 RX ADMIN — INSULIN LISPRO 2 UNITS: 100 INJECTION, SOLUTION INTRAVENOUS; SUBCUTANEOUS at 13:17

## 2020-04-23 RX ADMIN — ATORVASTATIN CALCIUM 80 MG: 80 TABLET, FILM COATED ORAL at 09:56

## 2020-04-23 RX ADMIN — POTASSIUM CHLORIDE 40 MEQ: 1500 TABLET, EXTENDED RELEASE ORAL at 09:56

## 2020-04-23 RX ADMIN — SERTRALINE 25 MG: 50 TABLET, FILM COATED ORAL at 09:56

## 2020-04-23 RX ADMIN — LISINOPRIL 20 MG: 20 TABLET ORAL at 09:55

## 2020-04-23 RX ADMIN — AMLODIPINE BESYLATE 10 MG: 10 TABLET ORAL at 09:56

## 2020-04-23 RX ADMIN — INSULIN GLARGINE 20 UNITS: 100 INJECTION, SOLUTION SUBCUTANEOUS at 10:04

## 2020-04-23 RX ADMIN — PANTOPRAZOLE SODIUM 40 MG: 40 TABLET, DELAYED RELEASE ORAL at 09:56

## 2020-04-23 RX ADMIN — FERROUS SULFATE TAB 325 MG (65 MG ELEMENTAL FE) 325 MG: 325 (65 FE) TAB at 09:56

## 2020-04-23 RX ADMIN — METOPROLOL TARTRATE 25 MG: 25 TABLET ORAL at 09:55

## 2020-04-23 RX ADMIN — GABAPENTIN 300 MG: 300 CAPSULE ORAL at 13:16

## 2020-04-23 RX ADMIN — MAGNESIUM GLUCONATE 500 MG ORAL TABLET 400 MG: 500 TABLET ORAL at 09:55

## 2020-04-23 RX ADMIN — FUROSEMIDE 40 MG: 40 TABLET ORAL at 11:37

## 2020-04-23 ASSESSMENT — PAIN SCALES - GENERAL
PAINLEVEL_OUTOF10: 0
PAINLEVEL_OUTOF10: 0

## 2020-04-23 NOTE — PROGRESS NOTES
Discharge teaching and instructions for diagnosis/procedure of anemia completed with patient using teachback method. AVS reviewed. Printed prescriptions given to patient. Patient voiced understanding regarding prescriptions, follow up appointments, and care of self at home. Discharged in a wheelchair to  home with support per EMS transportation.

## 2020-04-23 NOTE — PROGRESS NOTES
CLINICAL PHARMACY NOTE: MEDS TO [address] Select Patient?: No  Total # of Prescriptions Filled: 7   The following medications were delivered to the patient:  Mag-Ox 400mg  Furosemide 40mg  Humalog 100u/ml  Lantuss 100u/ml  Potassium Chloride Aneta ER 20meq  Pantoprazole 40mg  Total # of Interventions Completed: 2  Time Spent (min): 30    Additional Documentation:

## 2020-04-23 NOTE — CARE COORDINATION
4/23/20, 10:32 AM EDT    Patient goals/plan/ treatment preferences discussed by  and . Patient goals/plan/ treatment preferences reviewed with patient/ family. Patient/ family verbalize understanding of discharge plan and are in agreement with goal/plan/treatment preferences. Understanding was demonstrated using the teach back method. AVS provided by RN at time of discharge, which includes all necessary medical information pertaining to the patients current course of illness, treatment, post-discharge goals of care, and treatment preferences. Patient discharge home today with [facility], current with RN, PT/OT, aides. MARLENY spoke with Roxy Urbina and provided referral.  RN updated.

## 2020-04-23 NOTE — DISCHARGE SUMMARY
of 4/20; appreciate pulmonology input; was started on CPAP 12 cm H20; needs outpt sleep study and follow up with Margarito Pardo (in [address])  13. Possible Obesity Hypoventilation Syndrome--no ABG's to assess C02; BMI at 51.2  14. Physical deconditioning--home PT/OT; encouraged lifestyle modifications    The patient was seen and examined on day of discharge and this discharge summary is in conjunction with any daily progress note from day of discharge. Hospital Course:   Carol Rodriguez is a 62 y.o. male admitted to [address] [place] [address] on 4/17/2020 for shortness of breath;  per Dr. Semaj Leung H&P from 4/17: \" 61 y/o M PMHx HTN, HLD, IDDM2 with associated neuropathy, GERD, gout, depression and former tobacco abuse -- presents to [facility] with a chief complaint of shortness of breath. History provided by the patient.     Patient endorses 1-month hx progressive shortness of breath with worsening BLE edema. He also admits to recent, new-onset bilateral scrotal edema without associated tenderness or urinary symptoms. No precipitating/alleviating factors identified. Denies fevers/chills, cough, chest pain, palpitations, nausea/emesis, abdominal pain, diarrhea, weakness or syncope. He also denies recent illness or sick contacts. States he was reluctant to come to the hospital when his symptoms first started due to [place] pandemic. Denies known history of CHF, but states he was diagnosed with 2 heart attacks many years ago in [place] (cannot recall name of hospital or physician). Of note, he currently sleeps upright in a hospital bed at home.     No additional questions / concerns identified at this time.     ED/early hospital course: afebrile, tachypnic (30s), but otherwise hemodynamically appropriate with SpO2 95% on RA.  Workup remarkable for: microcytic anemia (hgb 6.6 / MCV 67.2; iron 11, ferritin 10, TIBC 319, Retic Ct nml, B12/folate nml), WBC/Plt nml, , CO2: 19 (AG 15), Cr 1.0 / BUN 15 / eGFR 75; CMP otherwise cooperative. HEENT:  Normal cephalic, atraumatic without obvious deformity. Pupils equal, round, and reactive to light. Conjunctivae/corneas clear. Neck: Supple, with full range of motion. No jugular venous distention. Trachea midline. Respiratory:  Normal respiratory effort. Clear to auscultation, bilaterally without Rales/Wheezes/Rhonchi. Cardiovascular:  Regular rate and rhythm with normal S1/S2 without murmurs, rubs or gallops. Abdomen: Soft, non-tender, non-distended with normal bowel sounds. Musculoskeletal:  (+) edema bilateral lower legs  Full range of motion without deformity. Skin: Skin color, texture, turgor normal.    Neurologic:  Neurovascularly intact without any focal sensory/motor deficits. Cranial nerves: II-XII intact, grossly non-focal.  Psychiatric:  Alert and oriented, thought content appropriate  Capillary Refill: Brisk,< 3 seconds   Peripheral Pulses: +2 palpable, equal bilaterally     Labs: For convenience and continuity at follow-up the following most recent labs are provided:    CBC:    Lab Results   Component Value Date    WBC 6.5 04/23/2020    HGB 9.1 04/23/2020    HCT 35.3 04/23/2020     04/23/2020       Renal:    Lab Results   Component Value Date     04/23/2020    K 4.5 04/23/2020     04/23/2020    CO2 25 04/23/2020    BUN 15 04/23/2020    CREATININE 1.0 04/23/2020    CALCIUM 8.4 04/23/2020    PHOS 3.4 03/14/2018       Cardiac: No results for input(s): Jasper So in the last 72 hours. Significant Diagnostic Studies    Radiology:   XR CHEST PORTABLE   Final Result      No acute cardiopulmonary disease. Borderline cardiomegaly. **This report has been created using voice recognition software. It may contain minor errors which are inherent in voice recognition technology. **      Final report electronically signed by Dr. Dilan London on 4/20/2020 2:10 AM      XR CHEST PORTABLE   Final Result      1.  Bibasilar opacities which may represent

## 2020-04-23 NOTE — PROGRESS NOTES
Gastroenterology Progress Note:     Patient Name:  William Meadows   MRN: [id number]  [id number]  YOB: 1955  Admit Date: 4/17/2020  1:47 PM  Primary Care Physician: REI Castillo - CNP   [id number]     Patient seen and examined. 24 hours events and chart reviewed. Subjective: Patient sitting up in the chair. Denies abdominal pain, nausea, and vomiting. No blood in his stools. Hgb 9.1    Objective:  BP (!) 144/59   Pulse 95   Temp 98.1 °F (36.7 °C) (Oral)   Resp 22   Ht 5' 10\" (1.778 m)   Wt (!) 356 lb 0.7 oz (161.5 kg)   SpO2 95%   BMI 51.09 kg/m²     Physical Exam:    General:  Nourished in no distress  HEENT: Atraumatic, normocephalic. Moist oral mucous membranes. Neck: Supple without adenopathy, JVD, thyromegaly or masses. Trachea midline. CV: Heart RRR, no murmurs, rubs, gallops. Resp: Even, easy without cough or accessory use. Lungs clear to ascultation bilaterally. Abd: Round, soft, obese, nontender. No hepatosplenomegaly or mass present. Active bowel sounds heard. No distention noted. Ext:  Without cyanosis, clubbing. 1+ edema. Skin: Pink, warm, dry  Neuro:  Alert, oriented x 3 with no obvious deficits.        Rectal: deferred    Labs:   CBC:   Lab Results   Component Value Date    WBC 6.5 04/23/2020    HGB 9.1 04/23/2020    HCT 35.3 04/23/2020    MCV 73.1 04/23/2020     04/23/2020     BMP:   Lab Results   Component Value Date     04/23/2020    K 4.5 04/23/2020     04/23/2020    CO2 25 04/23/2020    PHOS 3.4 03/14/2018    BUN 15 04/23/2020    CREATININE 1.0 04/23/2020    CALCIUM 8.4 04/23/2020     Lipids:   Lab Results   Component Value Date    ALKPHOS 62 04/17/2020    ALT 6 04/17/2020    AST 14 04/17/2020    BILITOT 0.5 04/17/2020    BILIDIR <0.2 03/07/2018    LABALBU 3.8 04/17/2020    AMYLASE 28 03/07/2018    LIPASE 46.8 03/07/2018     Current Meds:  Scheduled Meds:   insulin lispro  10 Units Subcutaneous TID WC    furosemide  40 mg Intravenous

## 2020-04-23 NOTE — ADT AUTH CERT
20 Units Subcutaneous BID   · ferrous sulfate 325 mg Oral BID WC   · amLODIPine 10 mg Oral Daily   · atorvastatin 80 mg Oral Daily   · gabapentin 300 mg Oral TID   · insulin lispro 0-12 Units Subcutaneous TID WC   · insulin lispro 0-6 Units Subcutaneous Nightly   · lisinopril 20 mg Oral Daily   · metoprolol tartrate 25 mg Oral Daily   · sertraline 25 mg Oral Daily          Assessment:   61 yo M admitted 04/17/20 dyspnea with exertion, severe anemia, and scrotal edema. He has never had an EGD or colonoscopy. Initial Hgb 6.6, received 2 units PRBC total. Received 2 doses of IV Venofer. FOBT negative. COVID test negative. He has never had an EGD or colonoscopy. 1. Severe iron deficiency anemia- s/p 2 doses IV Venofer, s/p 2 units PRBC, FOBT negative, no active GI bleeding   2. Acute CHF exacerbation   3. NOGUEIRA, improving- COVID negative   4. Scrotal edema   5. Morbid obesity   6. HTN   7. HLD   8. GERD   9. DM   10. Depression & anxiety          Plan:     1. Monitor H & H, transfuse prn   2. Nursing to monitor stool output   3. Continue iron supplementation   4. PPI daily   5. Okay to resume aspirin    6. Cardiac, carb count diet   7. Will need EGD & colonoscopy outpatient once COVID restrictions are lifted, will be high risk with BMI >50   8. Supportive care per primary team   9. Okay to discharge from GI standpoint when cleared by primary team   10.  Will need 3 week follow-up with Ayo MINAYA-NITA   GI signing off       Sodium 139 meq/L      Potassium 4.5 meq/L      Chloride 102 meq/L      CO2 25 meq/L      Glucose 173 mg/dL      BUN 15 mg/dL      CREATININE 1.0 mg/dL      Calcium 8.4 mg/dL       WBC 6.5 thou/mm3      RBC 4.83 mill/mm3      Hemoglobin 9.1 gm/dl      Hematocrit 35.3 %      MCV 73.1 fL      MCH 18.8 pg      MCHC 25.8 gm/dl      RDW-CV 27.6 %      RDW-SD 67.5 fL      Platelets 313 thou/mm3      Seg Neutrophils 57.5 %      Lymphocytes 20.9 %      Monocytes 13.7 %      Eosinophils 6.5 %      Respiratory:  Normal respiratory effort. Clear to auscultation, bilaterally without Rales/Wheezes/Rhonchi. Cardiovascular: Regular rate and rhythm with normal S1/S2 without murmurs, rubs or gallops. Abdomen: Soft, non-tender, non-distended with normal bowel sounds. Obese   Musculoskeletal: passive and active ROM x 4 extremities. (+) LE edema   Skin: Skin color, texture, turgor normal.     Neurologic:  Neurovascularly intact without any focal sensory/motor deficits. Cranial nerves: II-XII intact, grossly non-focal.   Psychiatric: Alert and oriented, thought content appropriate   Capillary Refill: Brisk,< 3 seconds    Peripheral Pulses: +2 palpable, equal bilaterally         Assessment/Plan:       1. Dyspnea--likely 2nd to #2, #3   2. Symptomatic anemia/ iron deficiency microcytic anemia--has received 2 units PRBC's; Vitamin B12, folate unremarkable; iron studies indicate iron deficiency anemia; FOB (-); appreciate GI input; plan to perform EGD & colonoscopy outpatient once COVID restrictions are lifted; S/P IV iron infusion and now on ferrous sulfate; stable; was taking Indocin at home along with ASA   3. Acute on chronic diastolic HF with anasarca--no accurate I/O's documented; weight is down 36#; HF clinic referral; on Lasix 40 mg daily; on BB, ACE-I; echo wth EF 55-60%    4. Essential HTN, uncontrolled--BB, ACE-I, CCB; monitor   5. HLP--on atorvastatin   6. DM-2, controlled with peripheral neuropathy--AIC at 6.1 on 4/17/2020; SSI #2, Lantus; on gabapentin; at home takes  Humalog 20 units TID, Nesina 25 mg daily, Glucophage 500 mg BID; resume pre meal bolus as sugars 180-258 past 24 hours   7. GERD--Protonix   8. Chronic depression and anxiety--Zoloft and Vistaril   9. History of tobacco dependence   10. Morbid obesity with BMI 51.2   11. Lower extremity excoriations related to cat scratches--Zithromax 4/18~today will be dose #5   12.  Probable BALTA--evidence for possible obstructive sleep apnea as patient Assessment   -Snoring, frequent nocturnal awakenings and excessive daytime sleepiness- High suspicion for sleep apnea. -Hypersomnia ( Excessive daytime sleepiness)may be due to obstructive sleep apnea  Vs Inadequate sleep hygiene.   -Nocturnal hypoxia- most likely due to obstructive sleep apnea Vs CHF. -Inadequate sleep hygiene. -Morbid Obesity.   -Chronic diastolic CHF with HFpEF. -Full Code       Plan   -CPAP with pressure of 12cm H20 during sleep and daytime naps. -Will schedule Faisal Fernandez for nocturnal polysomnogram (Sleep study) with split night protocol at [facility] after discharge from the current hospitalization. Patient to follow with Ms. Santosteredustin Campos ( Dr. Domonique Anderson) sleep clinic at [address], [address] for Pulmonary disease in 6 to 8 weeks after the sleep study with CPAP down load  to go over the study reports and down load review. - testing ordered in Epic    -Sleep questions completed    -Need optimization of his CHF by primary service.   -He was educated to practice good sleep hygiene practices.    -Faisal Fernandez advised to make earlier appointment with my clinic if he develops any worsening of sleep symptoms.   -Faisal Feranndez advised to not to drive any motor vehicles or operate heavy equipment until his sleep symptoms are under good control. Faisal Fernandez verbalizes understanding.   -He was advised to loose weight by controlling diet and doing exercise once cleared by his Cardiologist    - Patient educated about my impression and plan. Patient verbalizes understanding.   -Stable from pulmonary standpoint        Gastroenterology Progress Note:   Assessment:   63 yo M admitted 04/17/20 dyspnea with exertion, severe anemia, and scrotal edema. He has never had an EGD or colonoscopy. Initial Hgb 6.6, received 2 units PRBC total. Received 2 doses of IV Venofer. FOBT negative. COVID test negative. He has never had an EGD or colonoscopy.     1. Severe iron deficiency anemia-

## 2020-04-23 NOTE — PROGRESS NOTES
Final report electronically signed by Dr. Blas King on 4/20/2020 2:10 AM      XR CHEST PORTABLE   Final Result      1. Bibasilar opacities which may represent infiltrate or atelectasis. 2. Cardiomegaly. **This report has been created using voice recognition software. It may contain minor errors which are inherent in voice recognition technology. **      Final report electronically signed by Dr Dayo Chavis on 4/17/2020 2:48 PM          DVT prophylaxis: [] Lovenox                                 [x] SCDs                                 [] SQ Heparin                                 [] Encourage ambulation           [] Already on Anticoagulation     Code Status: Prior    Tele:   [x] yes SR HR 70             [] no    Active Hospital Problems    Diagnosis Date Noted    Acute diastolic (congestive) heart failure (HCC) [I50.31]     Nocturnal hypoxia [G47.34]     Anasarca [R60.1]     Abnormal thyroid function test [R94.6]     Type 2 diabetes mellitus with other specified complication ([facility] [address] [W85.04]     Anxiety and depression [F41.9, F32.9]     Family history of tobacco use [Z81.2]     Morbid obesity ([facility] [address] [E66.01]     Excoriation of lower leg, sequela [S80.819S]     Iron deficiency anemia [D50.9]     Symptomatic anemia [D64.9] 04/17/2020    Microcytic anemia [D50.9] 04/17/2020       Electronically signed by REI Westfall CNP on 4/23/2020 at 6:35 AM

## 2020-04-23 NOTE — PROGRESS NOTES
Climbing T-Scale Score : 38.96    ASSESSMENT:  Assessment: Patient progressing toward established goals. Activity Tolerance:  Patient tolerance of  treatment: fair. bilat LE grossly weak and fatigues quickly      Equipment Recommendations:Equipment Needed: No  Discharge Recommendations: Continue to assess pending progress    Plan: Times per week: 5xGM  Current Treatment Recommendations: Strengthening, Transfer Training, Endurance Training, Neuromuscular Re-education, Patient/Caregiver Education & Training, Functional Mobility Training, Stair training, Safety Education & Training, Balance Training, Gait Training, Home Exercise Program, Equipment Evaluation, Education, & procurement    Patient Education  Patient Education: [employer], Transfers, Gait, HEP    Goals:  Patient goals : go home with brother  Short term goals  Time Frame for Short term goals: by discharge  Short term goal 1: bed mobility with supervision A for ease of getting in/out of bed  Short term goal 2: sit <> stand with supervision A for ease of transfers   Short term goal 3: ambulate > 10ft with use of LRAD and stand-by assist to prepare for home mobility   Long term goals  Time Frame for Long term goals : N/A secondary to short ELOS     Following session, patient left in safe position with all fall risk precautions in place.

## 2020-04-23 NOTE — PROGRESS NOTES
[facility]    Patient - Ramón Reed   MRN -  [id number]   [facility] [address] # - [de-identified]   - 1955      Date of Admission -  2020  1:47 PM  Date of evaluation -  2020  Room - -36/036-A   Hospital Day - [address], APRN * Primary Care Physician - REI Moe - CNP     Problem List      Active Hospital Problems    Diagnosis Date Noted    Acute diastolic (congestive) heart failure (HCC) [I50.31]     Nocturnal hypoxia [G47.34]     Anasarca [R60.1]     Abnormal thyroid function test [R94.6]     Type 2 diabetes mellitus with other specified complication (HCC) [D55.16]     Anxiety and depression [F41.9, F32.9]     Family history of tobacco use [Z81.2]     Morbid obesity ([address] [E66.01]     Excoriation of lower leg, sequela [S80.819S]     Iron deficiency anemia [D50.9]     Symptomatic anemia [D64.9] 2020    Microcytic anemia [D50.9] 2020     Reason for Consult    For evaluation of sleep apnea. HPI   History Obtained From: Patient and electronic medical record. Ramón Reed is a 62 y.o. male was admitted under hospitalist service. Pulmonary and Sleep medicine was consulted for further evaluation of sleep apnea due to nocturnal hypoxia noted during a nocturna pulse ox study done last night I.e 20 to 20. He was found to have SpO2 <89%: 4hours 29minutes on room air. Past 24 Hours   -CPAP used overnight- feels great!!  -Stable on RA during the day   -Alert and oriented. -DC home today     -All systems reviewed.    PMHx   Past Medical History      Diagnosis Date    Depression     Gout     Hyperlipidemia     Hypertension     Neuropathy     Type II or unspecified type diabetes mellitus without mention of complication, not stated as uncontrolled       Past Surgical History        Procedure Laterality Date    BRONCHOSCOPY N/A 3/8/2018    BRONCHOSCOPY ALVEOLAR LAVAGE performed by times daily  [DISCONTINUED] insulin lispro (HUMALOG KWIKPEN) 100 UNIT/ML pen, Inject 20 Units into the skin 3 times daily (with meals)  lisinopril (PRINIVIL;ZESTRIL) 20 MG tablet, Take 20 mg by mouth daily  [DISCONTINUED] indomethacin (INDOCIN) 50 MG capsule, Take 50 mg by mouth 3 times daily as needed (for gout)   [DISCONTINUED] lansoprazole (PREVACID) 30 MG capsule, take 1 capsule by mouth once daily  Alcohol Swabs PADS,   Diet    DIET CARDIAC; Low Sodium (2 GM);  Daily Fluid Restriction: 2000 ml  Allergies    Aripiprazole and Citalopram hydrobromide  Social History     Social History     Socioeconomic History    Marital status: Single     Spouse name: Not on file    Number of children: 0    Years of education: ged    Highest education level: Not on file   Occupational History    Occupation: [employer]   Social Needs    Financial resource strain: Not on file    Food insecurity     Worry: Not on file     Inability: Not on file   [employer] needs     Medical: Not on file     Non-medical: Not on file   Tobacco Use    Smoking status: Former Smoker     Last attempt to quit: 11/15/1991     Years since quittin.4    Smokeless tobacco: Never Used   Substance and Sexual Activity    Alcohol use: Yes     Comment: one ddrink on new years and thats it   Larry Cui Drug use: No    Sexual activity: Not on file   Lifestyle    Physical activity     Days per week: Not on file     Minutes per session: Not on file    Stress: Not on file   Relationships    Social connections     Talks on phone: Not on file     Gets together: Not on file     Attends Yazidism service: Not on file     Active member of club or organization: Not on file     Attends meetings of clubs or organizations: Not on file     Relationship status: Not on file    Intimate partner violence     Fear of current or ex partner: Not on file     Emotionally abused: Not on file     Physically abused: Not on file     Forced sexual activity: Not on file   Other Topics normal systolic function and   wall thickness. Conclusions      Summary   Technically difficult examination. Normal left ventricle size and systolic function. Ejection fraction was   estimated at 55 to 60 %. There were no regional left ventricular wall   motion abnormalities and wall thickness was within normal limits. The left atrium is Mildly dilated. Signature      ----------------------------------------------------------------   Electronically signed by Isabelle Alba MD (Interpreting   physician) on 04/20/2020 at 04:46 PM   ----------------------------------------------------------------    Radiology    CXR  Apr 20, 2020   PROCEDURE: XR CHEST PORTABLE   No acute cardiopulmonary disease. Borderline cardiomegaly. CT Scans  (See actual reports for details)  PROCEDURE: CT CHEST WO CONTRAST   Low lung volumes. Left upper lobe consolidation possibly a combination of pneumonia and volume loss. Lingular and bilateral lower lobe atelectasis. Granulomatous disease. Possible small left pleural effusion. Satisfactory positions of the endotracheal and nasogastric tubes. Nocturnal pulse oximetry study results:   Nocturnal pulse oximetry study done on: 4/20/20 to 4/21/20  The study was done on room air . Time with SpO2 <89%: 4hours 29minutes. Assessment   -Snoring, frequent nocturnal awakenings and excessive daytime sleepiness- High suspicion for sleep apnea. -Hypersomnia ( Excessive daytime sleepiness)may be due to obstructive sleep apnea  Vs Inadequate sleep hygiene.  -Nocturnal hypoxia- most likely due to obstructive sleep apnea Vs CHF. -Inadequate sleep hygiene. -Morbid Obesity.  -Chronic diastolic CHF with HFpEF. -Full Code    Plan   -CPAP with pressure of 12cm H20 during sleep and daytime naps. - kim  -Will schedule Dineshjolynn Gayle for nocturnal polysomnogram (Sleep study) with split night protocol at [facility] sleep lab after discharge from the current hospitalization.  Patient to follow with Ms. Severo Francislola my ( Dr. Bart Guan) sleep clinic at [address], [address] for Pulmonary disease in 6 to 8 weeks after the sleep study with CPAP down load  to go over the study reports and down load review. - testing ordered in Epic   -Sleep questions completed   -Need optimization of his CHF by primary service.  -He was educated to practice good sleep hygiene practices.   -Autumn Nicholas advised to make earlier appointment with my clinic if he develops any worsening of sleep symptoms.  -Autumn Nicholas advised to not to drive any motor vehicles or operate heavy equipment until his sleep symptoms are under good control. Autumn Nicholas verbalizes understanding.  -He was advised to loose weight by controlling diet and doing exercise once cleared by his Cardiologist   - Patient educated about my impression and plan. Patient verbalizes understanding.  -Stable from pulmonary standpoint - OK for DC  -CPAP discontinued on DC- f/u as outlined above. Case discussed with primary RN and patient  Questions and concerns addressed. Meds and orders reviewed. Electronically signed by   REI Donnelly CNP on 4/23/2020 at 9:22 AM     Addendum by Dr. Sarah Levi MD:  I have seen and examined the patient independently. Face to face evaluation and examination was performed. The above evaluation and note has been reviewed. Labs and radiographs were reviewed. I Have discussed with ROMI Broussard CNP about this patient in detail. The above assessment and plan has been reviewed. Please see my modifications mentioned below. My modifications:  Using his CPAP well. Follow up as above.     Serenity Mayer MD 4/23/2020 5:52 PM

## 2020-04-24 ENCOUNTER — CARE COORDINATION (OUTPATIENT)
Dept: CASE MANAGEMENT | Age: 65
End: 2020-04-24

## 2020-04-24 NOTE — CARE COORDINATION
Patient contacted regarding recent discharge and COVID-19 risk   Care Transition Nurse/ Ambulatory Care Manager contacted the patient by telephone to perform post discharge assessment. Verified name and  with patient as identifiers. Patient has following risk factors of: acute respiratory failure. CTN/ACM reviewed discharge instructions, medical action plan and red flags related to discharge diagnosis. Reviewed and educated them on any new and changed medications related to discharge diagnosis. Advised obtaining a 90-day supply of all daily and as-needed medications. Education provided regarding infection prevention, and signs and symptoms of COVID-19 and when to seek medical attention with patient who verbalized understanding. Discussed exposure protocols and quarantine from [address] you at higher risk for severe illness  and given an opportunity for questions and concerns. The patient agrees to contact the COVID-19 hotline [phone] or PCP office for questions related to their healthcare. CTN/ACM provided contact information for future reference. From CDC: Are you at higher risk for severe illness?  Wash your hands often.  Avoid close contact (6 feet, which is about two arm lengths) with people who are sick.  Put distance between yourself and other people if COVID-19 is spreading in your community.  Clean and disinfect frequently touched surfaces.  Avoid all cruise travel and non-essential air travel.  Call your healthcare professional if you have concerns about COVID-19 and your underlying condition or if you are sick. For more information on steps you can take to protect yourself, see CDC's How to [place] for follow-up call in 7-14 days based on severity of symptoms and risk factors. Patient states he is doing well. Denies SOB or any issues. He has all his medications with no questions. Denies loop. He has no computer or phone.  He uses his

## 2020-04-30 ENCOUNTER — TELEPHONE (OUTPATIENT)
Dept: CARDIOLOGY CLINIC | Age: 65
End: 2020-04-30

## 2020-04-30 NOTE — TELEPHONE ENCOUNTER
Called to patient regarding missed appointment   No answer and unable to leave message   Emergency contact no longer in service

## 2020-05-01 ENCOUNTER — CARE COORDINATION (OUTPATIENT)
Dept: CASE MANAGEMENT | Age: 65
End: 2020-05-01

## 2020-05-21 ENCOUNTER — OFFICE VISIT (OUTPATIENT)
Dept: CARDIOLOGY CLINIC | Age: 65
End: 2020-05-21
Payer: COMMERCIAL

## 2020-05-21 VITALS
SYSTOLIC BLOOD PRESSURE: 164 MMHG | OXYGEN SATURATION: 96 % | BODY MASS INDEX: 45.1 KG/M2 | DIASTOLIC BLOOD PRESSURE: 80 MMHG | HEIGHT: 70 IN | WEIGHT: 315 LBS | HEART RATE: 70 BPM

## 2020-05-21 PROCEDURE — 3017F COLORECTAL CA SCREEN DOC REV: CPT | Performed by: NURSE PRACTITIONER

## 2020-05-21 PROCEDURE — G8427 DOCREV CUR MEDS BY ELIG CLIN: HCPCS | Performed by: NURSE PRACTITIONER

## 2020-05-21 PROCEDURE — 1111F DSCHRG MED/CURRENT MED MERGE: CPT | Performed by: NURSE PRACTITIONER

## 2020-05-21 PROCEDURE — G8417 CALC BMI ABV UP PARAM F/U: HCPCS | Performed by: NURSE PRACTITIONER

## 2020-05-21 PROCEDURE — 99214 OFFICE O/P EST MOD 30 MIN: CPT | Performed by: NURSE PRACTITIONER

## 2020-05-21 PROCEDURE — 4004F PT TOBACCO SCREEN RCVD TLK: CPT | Performed by: NURSE PRACTITIONER

## 2020-05-21 RX ORDER — LANSOPRAZOLE 15 MG/1
15 CAPSULE, DELAYED RELEASE ORAL DAILY
COMMUNITY
Start: 2020-05-08

## 2020-05-21 ASSESSMENT — ENCOUNTER SYMPTOMS
COUGH: 0
SHORTNESS OF BREATH: 1
ABDOMINAL DISTENTION: 0

## 2020-05-21 NOTE — PROGRESS NOTES
Grandmother     Parkinsonism Paternal Uncle      Social History     Tobacco Use    Smoking status: Former Smoker     Last attempt to quit: 11/15/1991     Years since quittin.5    Smokeless tobacco: Never Used   Substance Use Topics    Alcohol use: Yes     Comment: one ddrink on new years and thats it     Current Outpatient Medications   Medication Sig Dispense Refill    metoprolol tartrate (LOPRESSOR) 25 MG tablet Take 1 tablet by mouth 2 times daily 60 tablet 5    insulin glargine (LANTUS) 100 UNIT/ML injection vial Inject 20 Units into the skin 2 times daily 1 vial 3    insulin lispro (HUMALOG) 100 UNIT/ML injection vial Inject 10 Units into the skin 3 times daily (with meals) 1 vial 3    magnesium oxide (MAG-OX) 400 (241.3 Mg) MG TABS tablet Take 1 tablet by mouth daily 30 tablet 0    potassium chloride (KLOR-CON M) 20 MEQ extended release tablet Take 1 tablet by mouth daily 60 tablet 3    ferrous sulfate (IRON 325) 325 (65 Fe) MG tablet Take 1 tablet by mouth 2 times daily (with meals) 30 tablet 3    furosemide (LASIX) 40 MG tablet Take 1 tablet by mouth daily 30 tablet 0    alogliptin (NESINA) 25 MG TABS tablet Take 25 mg by mouth daily      lisinopril (PRINIVIL;ZESTRIL) 20 MG tablet Take 20 mg by mouth daily      metFORMIN (GLUCOPHAGE XR) 500 MG extended release tablet Take 500 mg by mouth 2 times daily      hydrOXYzine (VISTARIL) 25 MG capsule Take 25 mg by mouth nightly as needed (for difficulty sleeping)      gabapentin (NEURONTIN) 300 MG capsule take 1 capsule by mouth three times a day 270 capsule 1    amLODIPine (NORVASC) 10 MG tablet Take 1 tablet by mouth daily. 90 tablet 1    aspirin EC 81 MG EC tablet Take 1 tablet by mouth daily.  90 tablet 3    lansoprazole (PREVACID) 15 MG delayed release capsule Take 15 mg by mouth daily      sertraline (ZOLOFT) 50 MG tablet Take 50 mg by mouth daily      atorvastatin (LIPITOR) 80 MG tablet Take 80 mg by mouth daily      FREESTYLE LITE strip TEST as directed four times a day 100 each 11    Lancets MISC Apply 1 each topically 2 times daily. 200 each 1    Blood Glucose Monitoring Suppl (FREESTYLE FREEDOM LITE) W/DEVICE KIT by Does not apply route. Please dispense strips 100 kit 3    Alcohol Swabs PADS  200 each 11    Insulin Pen Needle (UNIFINE PENTIPS) 31G X 8 MM MISC by Does not apply route. 300 each 11     No current facility-administered medications for this visit. Allergies   Allergen Reactions    Aripiprazole Other (See Comments)     Pain in lower back    Citalopram Hydrobromide Other (See Comments)     Muscle aches and pain       SUBJECTIVE:   Review of Systems   Constitutional: Positive for fatigue. Negative for activity change and appetite change. Respiratory: Positive for shortness of breath (improved). Negative for cough. Cardiovascular: Negative for chest pain, palpitations and leg swelling. Gastrointestinal: Negative for abdominal distention. Musculoskeletal: Positive for gait problem. Neurological: Negative for weakness, light-headedness and headaches. Hematological: Negative for adenopathy. Psychiatric/Behavioral: Negative for sleep disturbance. OBJECTIVE:   Today's Vitals:  BP (!) 164/80 (Site: Left Upper Arm)   Pulse 70   Ht 5' 10\" (1.778 m)   Wt (!) 339 lb (153.8 kg)   SpO2 96%   BMI 48.64 kg/m²     Physical Exam  Vitals signs reviewed. Constitutional:       General: He is not in acute distress. Appearance: Normal appearance. He is well-developed. He is obese. He is not diaphoretic. HENT:      Head: Normocephalic and atraumatic. Eyes:      Conjunctiva/sclera: Conjunctivae normal.   Neck:      Musculoskeletal: Normal range of motion and neck supple. Comments: No JVD  Cardiovascular:      Rate and Rhythm: Normal rate and regular rhythm. Heart sounds: Normal heart sounds. No murmur. Pulmonary:      Effort: Pulmonary effort is normal. No respiratory distress.       Breath Results   Component Value Date    ALKPHOS 62 04/17/2020    ALT 6 04/17/2020    AST 14 04/17/2020    PROT 6.7 04/17/2020    BILITOT 0.5 04/17/2020    BILIDIR <0.2 03/07/2018    LABALBU 3.8 04/17/2020     Magnesium:    Lab Results   Component Value Date    MG 1.9 04/22/2020     PT/INR:  No results found for: PROTIME, INR  Lipids:    Lab Results   Component Value Date    TRIG 368 04/18/2014    HDL 37 04/18/2014    LDLCALC 27 04/18/2014       ASSESSMENT AND PLAN:   The patient's condition/symptoms are Stable: No clinical evidence of fluid overload today. Continue current medical regimen without changes at present time. Diagnosis Orders   1. CHF (congestive heart failure), NYHA class II/III, chronic, diastolic (HCC)  Basic Metabolic Panel    Brain Natriuretic Peptide   2. Essential hypertension     3. Morbid obesity (HCC)       Continue:  GDMT:   ACE/ARB/ARNI - Lisinopril 20/day   BB - Lopressor 25/day = INCREASE BID   Diuretic - Lasix 40/day   Hydralazine/Isos. - None   Aldosterone- None  Continue Current medications  Stable, appears fairly Euvolemic  Suppose get bloodwork for PCP  Add BMP, BNP  BP elevated - increase Lopressor   Call w/ BP readings in 1-2 weeks  Attempt to increase activity  Discussed diet at length - POOR diet. Handouts given. F/U w/ GI, anemia, and Pulomary/sleep medicine - needs sleep study   F/U w/ [place] in 3 weeks  F/U in clinic in 3 months or sooner if needed. Greater than 40 minutes has been spent on education of HF symptoms, management, medication, and plan of care; as well as review of chart: labs, ECHO, radiology reports, etc.   I personally spent more then 50% of the appt time face to face with the patient. New HF Pamphlet given and ZONE sheet reviewed, patient voices understanding. Questions answered.       · Daily weights  · Fluid restriction of 2 Liters per day  · Limit sodium in diet to around 0773-4853 mg/day  · Monitor BP  · Activity as tolerated     Patient was instructed

## 2020-05-27 ENCOUNTER — TELEPHONE (OUTPATIENT)
Dept: CARDIOLOGY CLINIC | Age: 65
End: 2020-05-27

## 2020-09-01 ENCOUNTER — TELEPHONE (OUTPATIENT)
Dept: CARDIOLOGY CLINIC | Age: 65
End: 2020-09-01

## 2020-09-01 NOTE — TELEPHONE ENCOUNTER
Patient no showed to appointment with Karlie Rodriguez 9/1  No answer and unable to leave message   Letter mailed

## 2020-09-29 ENCOUNTER — HOSPITAL ENCOUNTER (OUTPATIENT)
Age: 65
Setting detail: SPECIMEN
Discharge: HOME | End: 2020-09-29
Payer: COMMERCIAL

## 2020-09-29 LAB
ALBUMIN SERPL-MCNC: 3.7 G/DL (ref 3.5–5.2)
ALBUMIN/GLOBULIN RATIO: 1.2 (ref 1–2.5)
ALP BLD-CCNC: 83 U/L (ref 40–129)
ALT SERPL-CCNC: 19 U/L (ref 5–41)
ANION GAP SERPL CALCULATED.3IONS-SCNC: 13 MMOL/L (ref 9–17)
AST SERPL-CCNC: 16 U/L
BILIRUB SERPL-MCNC: 0.28 MG/DL (ref 0.3–1.2)
BUN BLDV-MCNC: 16 MG/DL (ref 8–23)
BUN/CREAT BLD: ABNORMAL (ref 9–20)
CALCIUM SERPL-MCNC: 9 MG/DL (ref 8.6–10.4)
CHLORIDE BLD-SCNC: 103 MMOL/L (ref 98–107)
CHOLESTEROL/HDL RATIO: 6.6
CHOLESTEROL: 246 MG/DL
CO2: 23 MMOL/L (ref 20–31)
CREAT SERPL-MCNC: 0.96 MG/DL (ref 0.7–1.2)
GFR AFRICAN AMERICAN: >60 ML/MIN
GFR NON-AFRICAN AMERICAN: >60 ML/MIN
GFR SERPL CREATININE-BSD FRML MDRD: ABNORMAL ML/MIN/{1.73_M2}
GFR SERPL CREATININE-BSD FRML MDRD: ABNORMAL ML/MIN/{1.73_M2}
GLUCOSE BLD-MCNC: 162 MG/DL (ref 70–99)
HCT VFR BLD CALC: 51.6 % (ref 40.7–50.3)
HDLC SERPL-MCNC: 37 MG/DL
HEMOGLOBIN: 16.1 G/DL (ref 13–17)
LDL CHOLESTEROL: 139 MG/DL (ref 0–130)
MCH RBC QN AUTO: 28.4 PG (ref 25.2–33.5)
MCHC RBC AUTO-ENTMCNC: 31.2 G/DL (ref 28.4–34.8)
MCV RBC AUTO: 91.2 FL (ref 82.6–102.9)
NRBC AUTOMATED: 0 PER 100 WBC
PDW BLD-RTO: 13.9 % (ref 11.8–14.4)
PLATELET # BLD: 207 K/UL (ref 138–453)
PMV BLD AUTO: 11.6 FL (ref 8.1–13.5)
POTASSIUM SERPL-SCNC: 4.3 MMOL/L (ref 3.7–5.3)
RBC # BLD: 5.66 M/UL (ref 4.21–5.77)
SODIUM BLD-SCNC: 139 MMOL/L (ref 135–144)
TOTAL PROTEIN: 6.9 G/DL (ref 6.4–8.3)
TRIGL SERPL-MCNC: 348 MG/DL
VLDLC SERPL CALC-MCNC: ABNORMAL MG/DL (ref 1–30)
WBC # BLD: 10.3 K/UL (ref 3.5–11.3)

## 2021-02-23 ENCOUNTER — APPOINTMENT (OUTPATIENT)
Dept: INTERVENTIONAL RADIOLOGY/VASCULAR | Age: 66
DRG: 052 | End: 2021-02-23
Payer: COMMERCIAL

## 2021-02-23 ENCOUNTER — HOSPITAL ENCOUNTER (INPATIENT)
Age: 66
LOS: 3 days | Discharge: HOME | DRG: 052 | End: 2021-02-26
Attending: EMERGENCY MEDICINE | Admitting: FAMILY MEDICINE
Payer: COMMERCIAL

## 2021-02-23 ENCOUNTER — APPOINTMENT (OUTPATIENT)
Dept: CT IMAGING | Age: 66
DRG: 052 | End: 2021-02-23
Payer: COMMERCIAL

## 2021-02-23 ENCOUNTER — APPOINTMENT (OUTPATIENT)
Dept: GENERAL RADIOLOGY | Age: 66
DRG: 052 | End: 2021-02-23
Payer: COMMERCIAL

## 2021-02-23 DIAGNOSIS — R41.82 ALTERED MENTAL STATUS, UNSPECIFIED ALTERED MENTAL STATUS TYPE: Primary | ICD-10-CM

## 2021-02-23 DIAGNOSIS — T58.91XA CARBOXYHEMOGLOBINEMIA, ACCIDENTAL OR UNINTENTIONAL, INITIAL ENCOUNTER: ICD-10-CM

## 2021-02-23 LAB
ALBUMIN SERPL-MCNC: 3.2 G/DL (ref 3.5–5.1)
ALLEN TEST: ABNORMAL
ALP BLD-CCNC: 82 U/L (ref 38–126)
ALT SERPL-CCNC: 12 U/L (ref 11–66)
AMMONIA: 35 UMOL/L (ref 11–60)
AMPHETAMINE+METHAMPHETAMINE URINE SCREEN: NEGATIVE
ANION GAP SERPL CALCULATED.3IONS-SCNC: 15 MEQ/L (ref 8–16)
AST SERPL-CCNC: 22 U/L (ref 5–40)
BACTERIA: ABNORMAL /HPF
BARBITURATE QUANTITATIVE URINE: NEGATIVE
BASE EXCESS (CALCULATED): -0.6 MMOL/L (ref -2.5–2.5)
BASOPHILS # BLD: 0.7 %
BASOPHILS ABSOLUTE: 0.1 THOU/MM3 (ref 0–0.1)
BENZODIAZEPINE QUANTITATIVE URINE: NEGATIVE
BILIRUB SERPL-MCNC: 0.5 MG/DL (ref 0.3–1.2)
BILIRUBIN DIRECT: < 0.2 MG/DL (ref 0–0.3)
BILIRUBIN URINE: NEGATIVE
BLOOD, URINE: ABNORMAL
BUN BLDV-MCNC: 14 MG/DL (ref 7–22)
CALCIUM SERPL-MCNC: 9 MG/DL (ref 8.5–10.5)
CANNABINOID QUANTITATIVE URINE: POSITIVE
CARBON MONOXIDE, BLOOD: 9.9 % CO SAT
CASTS 2: ABNORMAL /LPF
CASTS UA: ABNORMAL /LPF
CHARACTER, URINE: CLEAR
CHLORIDE BLD-SCNC: 100 MEQ/L (ref 98–111)
CO2: 19 MEQ/L (ref 23–33)
COCAINE METABOLITE QUANTITATIVE URINE: NEGATIVE
COLLECTED BY:: ABNORMAL
COLOR: YELLOW
CREAT SERPL-MCNC: 1 MG/DL (ref 0.4–1.2)
CRYSTALS, UA: ABNORMAL
DEVICE: ABNORMAL
EKG ATRIAL RATE: 119 BPM
EKG P AXIS: 57 DEGREES
EKG P-R INTERVAL: 176 MS
EKG Q-T INTERVAL: 316 MS
EKG QRS DURATION: 76 MS
EKG QTC CALCULATION (BAZETT): 444 MS
EKG R AXIS: -65 DEGREES
EKG T AXIS: 46 DEGREES
EKG VENTRICULAR RATE: 119 BPM
EOSINOPHIL # BLD: 0.3 %
EOSINOPHILS ABSOLUTE: 0 THOU/MM3 (ref 0–0.4)
EPITHELIAL CELLS, UA: ABNORMAL /HPF
ERYTHROCYTE [DISTWIDTH] IN BLOOD BY AUTOMATED COUNT: 13.2 % (ref 11.5–14.5)
ERYTHROCYTE [DISTWIDTH] IN BLOOD BY AUTOMATED COUNT: 46.7 FL (ref 35–45)
GFR SERPL CREATININE-BSD FRML MDRD: 75 ML/MIN/1.73M2
GLUCOSE BLD-MCNC: 186 MG/DL (ref 70–108)
GLUCOSE BLD-MCNC: 205 MG/DL (ref 70–108)
GLUCOSE BLD-MCNC: 233 MG/DL (ref 70–108)
GLUCOSE URINE: 500 MG/DL
GLUCOSE, CSF: 100 MG/DL (ref 40–80)
GLUCOSE, CSF: 99 MG/DL (ref 40–80)
GRAM STAIN RESULT: NORMAL
HCO3: 22 MMOL/L (ref 23–28)
HCT VFR BLD CALC: 53.8 % (ref 42–52)
HEMOGLOBIN: 17 GM/DL (ref 14–18)
IMMATURE GRANS (ABS): 0.03 THOU/MM3 (ref 0–0.07)
IMMATURE GRANULOCYTES: 0.3 %
INDIA INK: NORMAL
KETONES, URINE: 40
LEUKOCYTE ESTERASE, URINE: NEGATIVE
LIPASE: 10.6 U/L (ref 5.6–51.3)
LYMPHOCYTES # BLD: 6.3 %
LYMPHOCYTES ABSOLUTE: 0.7 THOU/MM3 (ref 1–4.8)
MCH RBC QN AUTO: 30.1 PG (ref 26–33)
MCHC RBC AUTO-ENTMCNC: 31.6 GM/DL (ref 32.2–35.5)
MCV RBC AUTO: 95.2 FL (ref 80–94)
MONOCYTES # BLD: 2.7 %
MONOCYTES ABSOLUTE: 0.3 THOU/MM3 (ref 0.4–1.3)
NITRITE, URINE: NEGATIVE
NUCLEATED RED BLOOD CELLS: 0 /100 WBC
O2 SATURATION: 94 %
OPIATES, URINE: NEGATIVE
OSMOLALITY CALCULATION: 276.2 MOSMOL/KG (ref 275–300)
OXYCODONE: NEGATIVE
PCO2: 30 MMHG (ref 35–45)
PH BLOOD GAS: 7.47 (ref 7.35–7.45)
PH UA: 7.5 (ref 5–9)
PHENCYCLIDINE QUANTITATIVE URINE: NEGATIVE
PLATELET # BLD: 157 THOU/MM3 (ref 130–400)
PLATELET ESTIMATE: ADEQUATE
PMV BLD AUTO: 12.4 FL (ref 9.4–12.4)
PO2: 66 MMHG (ref 71–104)
POTASSIUM REFLEX MAGNESIUM: 4.2 MEQ/L (ref 3.5–5.2)
PRO-BNP: 1325 PG/ML (ref 0–900)
PROCALCITONIN: 0.08 NG/ML (ref 0.01–0.09)
PROTEIN CSF: 67 MG/DL (ref 12–60)
PROTEIN CSF: 68 MG/DL (ref 12–60)
PROTEIN UA: >= 300
RBC # BLD: 5.65 MILL/MM3 (ref 4.7–6.1)
RBC URINE: ABNORMAL /HPF
REASON FOR REJECTION: NORMAL
REJECTED TEST: NORMAL
SCAN OF BLOOD SMEAR: NORMAL
SEG NEUTROPHILS: 89.7 %
SEGMENTED NEUTROPHILS ABSOLUTE COUNT: 10.7 THOU/MM3 (ref 1.8–7.7)
SODIUM BLD-SCNC: 134 MEQ/L (ref 135–145)
SOURCE, BLOOD GAS: ABNORMAL
SPECIFIC GRAVITY, URINE: 1.02 (ref 1–1.03)
TOTAL CK: 121 U/L (ref 55–170)
TOTAL PROTEIN: 7.1 G/DL (ref 6.1–8)
TROPONIN T: < 0.01 NG/ML
TSH SERPL DL<=0.05 MIU/L-ACNC: 2.11 UIU/ML (ref 0.4–4.2)
UROBILINOGEN, URINE: 0.2 EU/DL (ref 0–1)
WBC # BLD: 11.9 THOU/MM3 (ref 4.8–10.8)
WBC UA: ABNORMAL /HPF

## 2021-02-23 PROCEDURE — 82803 BLOOD GASES ANY COMBINATION: CPT

## 2021-02-23 PROCEDURE — 85025 COMPLETE CBC W/AUTO DIFF WBC: CPT

## 2021-02-23 PROCEDURE — 6370000000 HC RX 637 (ALT 250 FOR IP): Performed by: NURSE PRACTITIONER

## 2021-02-23 PROCEDURE — 71045 X-RAY EXAM CHEST 1 VIEW: CPT

## 2021-02-23 PROCEDURE — 2060000000 HC ICU INTERMEDIATE R&B

## 2021-02-23 PROCEDURE — 83880 ASSAY OF NATRIURETIC PEPTIDE: CPT

## 2021-02-23 PROCEDURE — 87075 CULTR BACTERIA EXCEPT BLOOD: CPT

## 2021-02-23 PROCEDURE — 89051 BODY FLUID CELL COUNT: CPT

## 2021-02-23 PROCEDURE — 62328 DX LMBR SPI PNXR W/FLUOR/CT: CPT | Performed by: RADIOLOGY

## 2021-02-23 PROCEDURE — 84145 PROCALCITONIN (PCT): CPT

## 2021-02-23 PROCEDURE — 99222 1ST HOSP IP/OBS MODERATE 55: CPT | Performed by: NURSE PRACTITIONER

## 2021-02-23 PROCEDURE — 94761 N-INVAS EAR/PLS OXIMETRY MLT: CPT

## 2021-02-23 PROCEDURE — 2580000003 HC RX 258: Performed by: STUDENT IN AN ORGANIZED HEALTH CARE EDUCATION/TRAINING PROGRAM

## 2021-02-23 PROCEDURE — 87205 SMEAR GRAM STAIN: CPT

## 2021-02-23 PROCEDURE — 36415 COLL VENOUS BLD VENIPUNCTURE: CPT

## 2021-02-23 PROCEDURE — 81001 URINALYSIS AUTO W/SCOPE: CPT

## 2021-02-23 PROCEDURE — 88112 CYTOPATH CELL ENHANCE TECH: CPT

## 2021-02-23 PROCEDURE — 2580000003 HC RX 258: Performed by: NURSE PRACTITIONER

## 2021-02-23 PROCEDURE — 99284 EMERGENCY DEPT VISIT MOD MDM: CPT

## 2021-02-23 PROCEDURE — 84157 ASSAY OF PROTEIN OTHER: CPT

## 2021-02-23 PROCEDURE — 80048 BASIC METABOLIC PNL TOTAL CA: CPT

## 2021-02-23 PROCEDURE — 82945 GLUCOSE OTHER FLUID: CPT

## 2021-02-23 PROCEDURE — 80076 HEPATIC FUNCTION PANEL: CPT

## 2021-02-23 PROCEDURE — 82140 ASSAY OF AMMONIA: CPT

## 2021-02-23 PROCEDURE — 36600 WITHDRAWAL OF ARTERIAL BLOOD: CPT

## 2021-02-23 PROCEDURE — 84484 ASSAY OF TROPONIN QUANT: CPT

## 2021-02-23 PROCEDURE — 87086 URINE CULTURE/COLONY COUNT: CPT

## 2021-02-23 PROCEDURE — 93005 ELECTROCARDIOGRAM TRACING: CPT | Performed by: STUDENT IN AN ORGANIZED HEALTH CARE EDUCATION/TRAINING PROGRAM

## 2021-02-23 PROCEDURE — 87529 HSV DNA AMP PROBE: CPT

## 2021-02-23 PROCEDURE — 99254 IP/OBS CNSLTJ NEW/EST MOD 60: CPT | Performed by: PSYCHIATRY & NEUROLOGY

## 2021-02-23 PROCEDURE — 82550 ASSAY OF CK (CPK): CPT

## 2021-02-23 PROCEDURE — 80307 DRUG TEST PRSMV CHEM ANLYZR: CPT

## 2021-02-23 PROCEDURE — 87327 CRYPTOCOCCUS NEOFORM AG IA: CPT

## 2021-02-23 PROCEDURE — 83690 ASSAY OF LIPASE: CPT

## 2021-02-23 PROCEDURE — 87496 CYTOMEG DNA AMP PROBE: CPT

## 2021-02-23 PROCEDURE — 70450 CT HEAD/BRAIN W/O DYE: CPT

## 2021-02-23 PROCEDURE — 2709999900 HC NON-CHARGEABLE SUPPLY

## 2021-02-23 PROCEDURE — 87798 DETECT AGENT NOS DNA AMP: CPT

## 2021-02-23 PROCEDURE — 82375 ASSAY CARBOXYHB QUANT: CPT

## 2021-02-23 PROCEDURE — 87040 BLOOD CULTURE FOR BACTERIA: CPT

## 2021-02-23 PROCEDURE — 93010 ELECTROCARDIOGRAM REPORT: CPT | Performed by: NUCLEAR MEDICINE

## 2021-02-23 PROCEDURE — 6360000002 HC RX W HCPCS: Performed by: NURSE PRACTITIONER

## 2021-02-23 PROCEDURE — 87210 SMEAR WET MOUNT SALINE/INK: CPT

## 2021-02-23 PROCEDURE — 009U3ZX DRAINAGE OF SPINAL CANAL, PERCUTANEOUS APPROACH, DIAGNOSTIC: ICD-10-PCS | Performed by: RADIOLOGY

## 2021-02-23 PROCEDURE — 82948 REAGENT STRIP/BLOOD GLUCOSE: CPT

## 2021-02-23 PROCEDURE — 84443 ASSAY THYROID STIM HORMONE: CPT

## 2021-02-23 RX ORDER — METOPROLOL TARTRATE 50 MG/1
25 TABLET, FILM COATED ORAL 2 TIMES DAILY
Status: DISCONTINUED | OUTPATIENT
Start: 2021-02-23 | End: 2021-02-26 | Stop reason: HOSPADM

## 2021-02-23 RX ORDER — POLYETHYLENE GLYCOL 3350 17 G/17G
17 POWDER, FOR SOLUTION ORAL DAILY PRN
Status: DISCONTINUED | OUTPATIENT
Start: 2021-02-23 | End: 2021-02-26 | Stop reason: HOSPADM

## 2021-02-23 RX ORDER — SODIUM CHLORIDE 9 MG/ML
INJECTION, SOLUTION INTRAVENOUS CONTINUOUS
Status: DISCONTINUED | OUTPATIENT
Start: 2021-02-23 | End: 2021-02-26 | Stop reason: HOSPADM

## 2021-02-23 RX ORDER — SODIUM CHLORIDE 0.9 % (FLUSH) 0.9 %
10 SYRINGE (ML) INJECTION PRN
Status: DISCONTINUED | OUTPATIENT
Start: 2021-02-23 | End: 2021-02-26 | Stop reason: HOSPADM

## 2021-02-23 RX ORDER — ASPIRIN 81 MG/1
81 TABLET ORAL DAILY
Status: DISCONTINUED | OUTPATIENT
Start: 2021-02-23 | End: 2021-02-26 | Stop reason: HOSPADM

## 2021-02-23 RX ORDER — AMLODIPINE BESYLATE 10 MG/1
10 TABLET ORAL DAILY
Status: DISCONTINUED | OUTPATIENT
Start: 2021-02-23 | End: 2021-02-26 | Stop reason: HOSPADM

## 2021-02-23 RX ORDER — LISINOPRIL 20 MG/1
20 TABLET ORAL DAILY
Status: DISCONTINUED | OUTPATIENT
Start: 2021-02-23 | End: 2021-02-26 | Stop reason: HOSPADM

## 2021-02-23 RX ORDER — PROMETHAZINE HYDROCHLORIDE 25 MG/1
12.5 TABLET ORAL EVERY 6 HOURS PRN
Status: DISCONTINUED | OUTPATIENT
Start: 2021-02-23 | End: 2021-02-26 | Stop reason: HOSPADM

## 2021-02-23 RX ORDER — ATORVASTATIN CALCIUM 80 MG/1
80 TABLET, FILM COATED ORAL NIGHTLY
Status: DISCONTINUED | OUTPATIENT
Start: 2021-02-23 | End: 2021-02-26 | Stop reason: HOSPADM

## 2021-02-23 RX ORDER — 0.9 % SODIUM CHLORIDE 0.9 %
500 INTRAVENOUS SOLUTION INTRAVENOUS ONCE
Status: COMPLETED | OUTPATIENT
Start: 2021-02-23 | End: 2021-02-23

## 2021-02-23 RX ORDER — ONDANSETRON 2 MG/ML
4 INJECTION INTRAMUSCULAR; INTRAVENOUS EVERY 6 HOURS PRN
Status: DISCONTINUED | OUTPATIENT
Start: 2021-02-23 | End: 2021-02-26 | Stop reason: HOSPADM

## 2021-02-23 RX ORDER — ACETAMINOPHEN 650 MG/1
650 SUPPOSITORY RECTAL EVERY 6 HOURS PRN
Status: DISCONTINUED | OUTPATIENT
Start: 2021-02-23 | End: 2021-02-26 | Stop reason: HOSPADM

## 2021-02-23 RX ORDER — ACETAMINOPHEN 325 MG/1
650 TABLET ORAL EVERY 6 HOURS PRN
Status: DISCONTINUED | OUTPATIENT
Start: 2021-02-23 | End: 2021-02-26 | Stop reason: HOSPADM

## 2021-02-23 RX ORDER — LIDOCAINE HYDROCHLORIDE AND EPINEPHRINE BITARTRATE 20; .01 MG/ML; MG/ML
20 INJECTION, SOLUTION SUBCUTANEOUS ONCE
Status: DISCONTINUED | OUTPATIENT
Start: 2021-02-23 | End: 2021-02-23 | Stop reason: HOSPADM

## 2021-02-23 RX ORDER — FERROUS SULFATE 325(65) MG
325 TABLET ORAL 2 TIMES DAILY WITH MEALS
Status: DISCONTINUED | OUTPATIENT
Start: 2021-02-23 | End: 2021-02-26 | Stop reason: HOSPADM

## 2021-02-23 RX ORDER — SODIUM CHLORIDE 0.9 % (FLUSH) 0.9 %
10 SYRINGE (ML) INJECTION EVERY 12 HOURS SCHEDULED
Status: DISCONTINUED | OUTPATIENT
Start: 2021-02-23 | End: 2021-02-26 | Stop reason: HOSPADM

## 2021-02-23 RX ORDER — PANTOPRAZOLE SODIUM 40 MG/1
40 TABLET, DELAYED RELEASE ORAL
Status: DISCONTINUED | OUTPATIENT
Start: 2021-02-24 | End: 2021-02-26 | Stop reason: HOSPADM

## 2021-02-23 RX ADMIN — ATORVASTATIN CALCIUM 80 MG: 80 TABLET, FILM COATED ORAL at 23:12

## 2021-02-23 RX ADMIN — SODIUM CHLORIDE: 9 INJECTION, SOLUTION INTRAVENOUS at 23:34

## 2021-02-23 RX ADMIN — SERTRALINE 50 MG: 50 TABLET, FILM COATED ORAL at 23:11

## 2021-02-23 RX ADMIN — ENOXAPARIN SODIUM 60 MG: 60 INJECTION SUBCUTANEOUS at 23:32

## 2021-02-23 RX ADMIN — INSULIN LISPRO 1 UNITS: 100 INJECTION, SOLUTION INTRAVENOUS; SUBCUTANEOUS at 23:32

## 2021-02-23 RX ADMIN — AMLODIPINE BESYLATE 10 MG: 10 TABLET ORAL at 23:12

## 2021-02-23 RX ADMIN — FERROUS SULFATE TAB 325 MG (65 MG ELEMENTAL FE) 325 MG: 325 (65 FE) TAB at 23:12

## 2021-02-23 RX ADMIN — METOPROLOL TARTRATE 25 MG: 50 TABLET, FILM COATED ORAL at 23:11

## 2021-02-23 RX ADMIN — SODIUM CHLORIDE 500 ML: 9 INJECTION, SOLUTION INTRAVENOUS at 16:30

## 2021-02-23 RX ADMIN — LISINOPRIL 20 MG: 20 TABLET ORAL at 23:11

## 2021-02-23 RX ADMIN — ASPIRIN 81 MG: 81 TABLET, COATED ORAL at 23:11

## 2021-02-23 NOTE — ED NOTES
LP is being started at this time by Dr. Yoel Adams with nurses and Dr. Juju Luna at bedside      Frank Mike RN  02/23/21 [id number]

## 2021-02-23 NOTE — PROGRESS NOTES
Pt admitted to   198 via from ED from ED. Complaints: altered mental status. IV normal saline infusing into the antecubital left, condition patent and no redness at a rate of 50 mls/ hour. IV site free of s/s of infection or infiltration. Vital signs obtained. Assessment and data collection initiated. Two nurse skin assessment performed by Silvia JOHANSEN and Jose RN. Oriented to room. Policies and procedures for [facility] explained. All questions answered with no further questions at this time. Fall prevention and safety brochure discussed with patient. Bed alarm on. Call light in reach. Oriented to room. Silvia Dennis RN 2/23/2021 6:31 PM     Explained patients right to have family, representative or physician notified of their admission. Patient has N/A for physician to be notified. Patient has N/A for family/representative to be notified. Patient would like family notified once per shift?  No.

## 2021-02-23 NOTE — ED NOTES
Pt transported to 4A by cart in stable condition. Called [facility] and informed them that the patient was on their way to the unit.       Hernan Hackett LPN  62/02/82 [id number]

## 2021-02-23 NOTE — H&P
History & Physical        Patient:  Dianna Brito  YOB: 1955    MRN: [id number]     Acct: [de-identified]    PCP: REI Mittal NP    Date of Admission: 2/23/2021    Date of Service: Pt seen/examined on 02/23/21  and Admitted to Inpatient with expected LOS greater than two midnights due to medical therapy. Chief Complaint:  AMS      ASSESSMENT / PLAN:    1. Acute encephalopathy. Etiology unclear. CVA vs Seizure vs Meningitis. Admit to 4A. CT of the head non acute. LP at beside in ED unsuccessful and will be obtained by IR. Follow labs. Continue empiric ATB started in the ED. MRI and EEG to be completed. Neuro checks every 4 hours. PT/OT/SLP to see. Seen by neurology. Check UDS. Hold Home Gabapentin and Vistaril. NPO until able to pass a bedside swallow evaluation by nursing. 2. Leukocytosis. Check PCT. Obtain UA with reflex culture. CXR negative. 3. Type 2 diabetes. Hold home orals and basal bolus regimen. Start scale every 6 hours while NPO. 4. Primary hypertension. Home regimen restarted. 5. Chronic diastolic failure. No overt signs of decompensation. Hold Lasix. Daily weights. Strict intake and output. BB and ACE resumed. 6. HLP. Resume high intensity statin. Check fasting lipid panel. 7. GERD. PPI. 8. Depression and anxiety. Zoloft resumed. 9. Morbid obesity. BMI 48.78. History Of Present Illness:      60 y.o. male who presented to [address] [place] [address] with AMS. Brother called EMS today. Please note this HPI is difficult to obtain. Patient is unable to provide information, no family at bedside. Information was gathered via chart review and report. Last known well is conflicting ED resident notes Deborah Vitale at 0200 today, Neurology reports LKW was 2 days ago. On presentation to the ED he is lethargic but arouses. Does not follow commands. Tremor noted in the extremities, tongue and jaw. He withdrawals to pain.  Does not answer interview questions and unable to obtain ROS. Gaze preference to the left. Right side appears more weak than left. In the ED CT was obtained and non acute. Seen by neurology. LP was attempted in ED and not successful. He was taken to IR for LP. He has been given Acyclovir, Rocephin and Vancomycin. Past Medical History:          Diagnosis Date    Depression     Gout     Hyperlipidemia     Hypertension     Neuropathy     Type II or unspecified type diabetes mellitus without mention of complication, not stated as uncontrolled        Past Surgical History:          Procedure Laterality Date    BRONCHOSCOPY N/A 3/8/2018    BRONCHOSCOPY ALVEOLAR LAVAGE performed by Jorge L Sorto MD at [facility]    TONSILLECTOMY         Medications Prior to Admission:      Prior to Admission medications    Medication Sig Start Date End Date Taking? Authorizing Provider   Misc.  Devices (DIGITAL GLASS SCALE) MISC 1 Device by Does not apply route daily 5/28/20   REI Ayon CNP   lansoprazole (PREVACID) 15 MG delayed release capsule Take 15 mg by mouth daily 5/8/20   Historical Provider, MD   sertraline (ZOLOFT) 50 MG tablet Take 50 mg by mouth daily 5/8/20   Historical Provider, MD   metoprolol tartrate (LOPRESSOR) 25 MG tablet Take 1 tablet by mouth 2 times daily 5/21/20   REI Ayon CNP   insulin glargine (LANTUS) 100 UNIT/ML injection vial Inject 20 Units into the skin 2 times daily 4/23/20   REI Sarabia CNP   insulin lispro (HUMALOG) 100 UNIT/ML injection vial Inject 10 Units into the skin 3 times daily (with meals) 4/23/20   REI Sarabia CNP   magnesium oxide (MAG-OX) 400 (241.3 Mg) MG TABS tablet Take 1 tablet by mouth daily 4/23/20   REI Sarabia CNP   potassium chloride (KLOR-CON M) 20 MEQ extended release tablet Take 1 tablet by mouth daily 4/23/20   REI Sarabia CNP   ferrous sulfate (IRON 325) 325 (65 Fe) MG tablet Take 1 tablet by mouth 2 times daily (with meals) 4/23/20 REI Sarabia CNP   furosemide (LASIX) 40 MG tablet Take 1 tablet by mouth daily 4/23/20 4/23/21  REI Thao CNP   alogliptin (NESINA) 25 MG TABS tablet Take 25 mg by mouth daily    Historical Provider, MD   atorvastatin (LIPITOR) 80 MG tablet Take 80 mg by mouth daily    Historical Provider, MD   lisinopril (PRINIVIL;ZESTRIL) 20 MG tablet Take 20 mg by mouth daily    Historical Provider, MD   metFORMIN (GLUCOPHAGE XR) 500 MG extended release tablet Take 500 mg by mouth 2 times daily    Historical Provider, MD   hydrOXYzine (VISTARIL) 25 MG capsule Take 25 mg by mouth nightly as needed (for difficulty sleeping)    Historical Provider, MD   FREESTYLE LITE strip TEST as directed four times a day 10/26/14   REI Pereira CNP   gabapentin (NEURONTIN) 300 MG capsule take 1 capsule by mouth three times a day 10/21/14   REI Pereira CNP   amLODIPine (NORVASC) 10 MG tablet Take 1 tablet by mouth daily. 7/10/14   REI Pereira CNP   aspirin EC 81 MG EC tablet Take 1 tablet by mouth daily. 7/10/14   REI Pereira CNP   Lancets MISC Apply 1 each topically 2 times daily. 4/10/14   REI Pereira CNP   Blood Glucose Monitoring Suppl (FREESTYLE FREEDOM LITE) W/DEVICE KIT by Does not apply route. Please dispense strips 6/27/13   REI Pereira CNP   Alcohol Swabs PADS  3/7/12   Nitza Viveros MD   Insulin Pen Needle (UNIFINE PENTIPS) 31G X 8 MM MISC by Does not apply route. 3/7/12   Nitza Viveros MD       Allergies:  Aripiprazole and Citalopram hydrobromide    Social History:      The patient currently lives home    TOBACCO:   reports that he quit smoking about 29 years ago. He has never used smokeless tobacco.  ETOH:   reports current alcohol use.       Family History:      Positive as follows:        Problem Relation Age of Onset    Heart Attack Mother     Diabetes Mother     Lung Cancer Father     Cancer Maternal Uncle     Cancer Paternal Grandmother     Diabetes Paternal Grandmother     Parkinsonism Paternal Uncle        Diet:  NPO    REVIEW OF SYSTEMS:   Pertinent positives as noted in the HPI. All other systems reviewed and negative. PHYSICAL EXAM:    BP (!) 154/128   Pulse 111   Temp 98.5 °F (36.9 °C)   Resp 28   Ht 5' 10\" (1.778 m)   Wt (!) 340 lb (154.2 kg)   SpO2 95%   BMI 48.78 kg/m²     General appearance:  Ill appearing. appears stated age. HEENT:  Normal cephalic, atraumatic without obvious deformity. Pupils equal, round, and reactive to light. Extra ocular muscles intact. Conjunctivae/corneas clear. Neck: Supple, with full range of motion. No jugular venous distention. Trachea midline. Respiratory:  Normal respiratory effort. diminished to auscultation, bilaterally without Rales/Wheezes/Rhonchi. Cardiovascular:  irregular rate and rhythm with normal S1/S2 without murmurs, rubs or gallops. Abdomen: Soft, obese, non-tender, non-distended with normal bowel sounds. Musculoskeletal:  No clubbing, cyanosis or edema bilaterally. Full range of motion without deformity. Skin: Skin color, texture, turgor normal.  + rash legs, likely due to bed bugs. Neurologic:  Slight Gaze to left. Right side weaker than left when moving sporadically. Will not follow commands. Psychiatric:  Lethargic arouses easily. thought content inappropriate, abnormal insight  Capillary Refill: Brisk,< 3 seconds   Peripheral Pulses: +2 palpable, equal bilaterally       Labs:     Recent Labs     02/23/21  1351   WBC 11.9*   HGB 17.0   HCT 53.8*        Recent Labs     02/23/21  1351   *   K 4.2      CO2 19*   BUN 14   CREATININE 1.0   CALCIUM 9.0     Recent Labs     02/23/21  1351   AST 22   ALT 12   BILIDIR <0.2   BILITOT 0.5   ALKPHOS 82     No results for input(s): INR in the last 72 hours.   Recent Labs     02/23/21  1500   CKTOTAL 121       Urinalysis:      Lab Results   Component Value Date    NITRU NEGATIVE 03/09/2018    WBCUA 0-2 03/09/2018    BACTERIA NONE 03/09/2018    RBCUA 15-25 03/09/2018    BLOODU MODERATE 03/09/2018    GLUCOSEU >= 1000 03/09/2018       Intake & Output:  No intake/output data recorded. No intake/output data recorded. Radiology:     EKG:  I have reviewed the EKG with the following interpretation: ST    CT HEAD WO CONTRAST   Final Result    No acute intracranial process. **This report has been created using voice recognition software. It may contain minor errors which are inherent in voice recognition technology. **      Final report electronically signed by Dr. Renea Snell on 2/23/2021 2:54 PM      XR CHEST PORTABLE   Final Result   Very low lung volumes. No acute cardiopulmonary process otherwise            **This report has been created using voice recognition software. It may contain minor errors which are inherent in voice recognition technology. **      Final report electronically signed by Dr. Carolina Arnold on 2/23/2021 1:45 PM      MRI BRAIN W CONTRAST    (Results Pending)   IR LUMBAR PUNCTURE FOR DIAGNOSIS    (Results Pending)        DVT prophylaxis: Lovenox    Code Status: Full      SLP/PT/OT Eval Status: ordered    Disposition:Home vs IPR vs ECF    Active Hospital Problems    Diagnosis Date Noted    AMS (altered mental status) [R41.82] 02/23/2021       Thank you Zarina REI Johnson NP for the opportunity to be involved in this patient's care.     Electronically signed by REI Watson CNP on 2/23/2021 at 4:36 PM

## 2021-02-23 NOTE — PROGRESS NOTES
Pharmacy Note  Vancomycin Consult    Gennaro Tran is a 60 y.o. male started on Vancomycin for r/o meningitis; consult received from Dr. Priscilla Hawley to manage therapy. Also receiving the following antibiotics: ceftriaxone, acyclovir. Patient Active Problem List   Diagnosis    Gout    Diabetes mellitus type 1, uncontrolled (HCC)    Hypertension    Arthritis    Cellulitis of toe, left    Pyogenic arthritis of foot ([facility]    Hyperlipidemia    GERD (gastroesophageal reflux disease)    Sepsis ([facility]    Community acquired pneumonia    Diabetic ketoacidosis without coma associated with type 2 diabetes mellitus ([facility]    Diabetic ketoacidosis without coma associated with type 1 diabetes mellitus ([facility]    Acute respiratory failure with hypoxemia (HCC)    Symptomatic anemia    Microcytic anemia    Anasarca    Abnormal thyroid function test    Type 2 diabetes mellitus with other specified complication (HCC)    Anxiety and depression    Family history of tobacco use    Morbid obesity ([facility]    Excoriation of lower leg, sequela    Iron deficiency anemia    Acute diastolic (congestive) heart failure (HCC)    Nocturnal hypoxia    AMS (altered mental status)     Allergies:  Aripiprazole and Citalopram hydrobromide     Temp max: 98.5    Recent Labs     02/23/21  1351   BUN 14   CREATININE 1.0   WBC 11.9*     No intake or output data in the 24 hours ending 02/23/21 1823      Culture Date Source Results   2/23/21 Spinal fluid    2/23/21 CSF-HSV PCR               Ht Readings from Last 1 Encounters:   02/23/21 5' 10\" (1.778 m)        Wt Readings from Last 1 Encounters:   02/23/21 (!) 370 lb (167.8 kg)       Body mass index is 53.09 kg/m². Estimated Creatinine Clearance: 117 mL/min (based on SCr of 1 mg/dL). Goal Trough Level: 15-21 mcg/mL    Assessment/Plan:  Will initiate Vancomycin 1500 mg IV every 12 hours. Timing of trough level will be determined based on culture results, renal function, and clinical response.      Thank you

## 2021-02-23 NOTE — PROGRESS NOTES
1628 This procedure has been fully reviewed with the patient's brother via telephone and written informed consent has been obtained, witnessed by Dr Bre Garcia and myself. 1635 Patient received in IR for lumbar puncture. 1654 Procedure started [facility] Dr. Bre Garcia. [facility] 1712 1.5ml cerebral spinal fluid received in test tube 1  1716 2ml CSF fluid in tube #2  1721 2ml of CSF in tube #3  1726 2ml of CSF in tube #4  1727 Procedure completed; patient tolerated well. Band aid to lower back; no bleeding noted. Bandaid at the site. 1730 Patient on cart; comfort ensured. [phone] [id number] Patient taken to 4a  via cart.

## 2021-02-23 NOTE — ED PROVIDER NOTES
[facility]          Pt Name: Rockie Shone  MRN: [id number]  [place] 1955  Date of evaluation: 2/23/2021  Treating Resident Physician: Corey Celestin DO  Supervising Physician: [address] [place] [address]       Chief Complaint   Patient presents with    Shortness of Breath     History obtained from the patient. HISTORY OF PRESENT ILLNESS    HPI  Rockie Shone is a 60 y.o. male who presents to the emergency department for evaluation of shortness of breath and altered mental status. Blood glucose in the 200 per squad with saturations in the mid 90s. Patient not communicating well at time of evaluation, unsure of baseline. The patient has no other acute complaints at this time.       REVIEW OF SYSTEMS   Review of Systems   Unable to perform ROS: Mental status change         PAST MEDICAL AND SURGICAL HISTORY     Past Medical History:   Diagnosis Date    Depression     Gout     Hyperlipidemia     Hypertension     Neuropathy     Type II or unspecified type diabetes mellitus without mention of complication, not stated as uncontrolled      Past Surgical History:   Procedure Laterality Date    BRONCHOSCOPY N/A 3/8/2018    BRONCHOSCOPY ALVEOLAR LAVAGE performed by Meron Michelle MD at [facility]   [facility]     Current Facility-Administered Medications:     0.9 % sodium chloride bolus, 500 mL, Intravenous, Once, Corey Celestin DO, Last Rate: 250 mL/hr at 02/23/21 1630, 500 mL at 02/23/21 1630    vancomycin 1000 mg IVPB in 250 mL D5W addavial, 1,000 mg, Intravenous, Q12H, Miki Maddox MD    acyclovir (ZOVIRAX) 10 mg/kg in dextrose 5 % 250 mL IVPB, 10 mg/kg (Ideal), Intravenous, Q8H, Miki Maddox MD    lidocaine-EPINEPHrine 2 percent-1:519640 injection 20 mL, 20 mL, Intradermal, Once, Veronica Peng DO    cefTRIAXone (ROCEPHIN) 2000 mg IVPB in D5W 50ml minibag, 2,000 mg, Intravenous, Q12H, Feng Butler, DO    Current Outpatient Medications:     Misc. Devices (DIGITAL GLASS SCALE) MISC, 1 Device by Does not apply route daily, Disp: 1 each, Rfl: 0    lansoprazole (PREVACID) 15 MG delayed release capsule, Take 15 mg by mouth daily, Disp: , Rfl:     sertraline (ZOLOFT) 50 MG tablet, Take 50 mg by mouth daily, Disp: , Rfl:     metoprolol tartrate (LOPRESSOR) 25 MG tablet, Take 1 tablet by mouth 2 times daily, Disp: 60 tablet, Rfl: 5    insulin glargine (LANTUS) 100 UNIT/ML injection vial, Inject 20 Units into the skin 2 times daily, Disp: 1 vial, Rfl: 3    insulin lispro (HUMALOG) 100 UNIT/ML injection vial, Inject 10 Units into the skin 3 times daily (with meals), Disp: 1 vial, Rfl: 3    magnesium oxide (MAG-OX) 400 (241.3 Mg) MG TABS tablet, Take 1 tablet by mouth daily, Disp: 30 tablet, Rfl: 0    potassium chloride (KLOR-CON M) 20 MEQ extended release tablet, Take 1 tablet by mouth daily, Disp: 60 tablet, Rfl: 3    ferrous sulfate (IRON 325) 325 (65 Fe) MG tablet, Take 1 tablet by mouth 2 times daily (with meals), Disp: 30 tablet, Rfl: 3    furosemide (LASIX) 40 MG tablet, Take 1 tablet by mouth daily, Disp: 30 tablet, Rfl: 0    alogliptin (NESINA) 25 MG TABS tablet, Take 25 mg by mouth daily, Disp: , Rfl:     atorvastatin (LIPITOR) 80 MG tablet, Take 80 mg by mouth daily, Disp: , Rfl:     lisinopril (PRINIVIL;ZESTRIL) 20 MG tablet, Take 20 mg by mouth daily, Disp: , Rfl:     metFORMIN (GLUCOPHAGE XR) 500 MG extended release tablet, Take 500 mg by mouth 2 times daily, Disp: , Rfl:     hydrOXYzine (VISTARIL) 25 MG capsule, Take 25 mg by mouth nightly as needed (for difficulty sleeping), Disp: , Rfl:     FREESTYLE LITE strip, TEST as directed four times a day, Disp: 100 each, Rfl: 11    gabapentin (NEURONTIN) 300 MG capsule, take 1 capsule by mouth three times a day, Disp: 270 capsule, Rfl: 1    amLODIPine (NORVASC) 10 MG tablet, Take 1 tablet by mouth daily. , Disp: 90 HENT:      Head: Normocephalic and atraumatic. Mouth/Throat:      Mouth: Mucous membranes are moist.   Eyes:      Extraocular Movements: Extraocular movements intact. Pupils: Pupils are equal, round, and reactive to light. Cardiovascular:      Rate and Rhythm: Regular rhythm. Tachycardia present. Heart sounds: No murmur. No friction rub. No gallop. Pulmonary:      Effort: Tachypnea present. No accessory muscle usage or respiratory distress. Breath sounds: No stridor. Decreased breath sounds (Diffuse) present. Abdominal:      General: Bowel sounds are normal.      Tenderness: There is no abdominal tenderness. Skin:     General: Skin is warm and dry. Findings: Rash (Consistent with bedbugs.) present. Neurological:      General: No focal deficit present. Mental Status: He is disoriented and confused. GCS: GCS eye subscore is 4. GCS verbal subscore is 4. GCS motor subscore is 5. Comments: Patient does not follow commands well. Rhythmic tremulousness of the tongue and left lower extremity. MEDICAL DECISION MAKING   Initial Assessment:   1. Morbidly obese male. Chronically ill. Poor historian. Unsure of patient's baseline mentation. Recent no-show for doctor's appointment. Spoke with Dr. Sonja Irving, code stroke. Last known well 0200 this morning. Point-of-care glucose 200. Plan:   Labs without significant abnormality, white count of 12. Clear chest x-ray. No CK elevation, seizure unlikely. Normal head CT. Carboxyhemoglobin 9.9, patient placed on nasal cannula oxygen. Consulted neurology who performed bedside evaluation. Rule out infectious etiology of altered mental status with IR guided lumbar puncture after inability to place bedside. MRI brain with contrast.  ABG with respiratory alkalosis. Mild BNP elevation of 1300. No improvement during the course of patient's ED stay. He is to be admitted for further work-up evaluation.   Hospitalist  **This report has been created using voice recognition software. It may contain minor errors which are inherent in voice recognition technology. **      Final report electronically signed by Dr. Leyda Wilcox on 2/23/2021 2:54 PM      XR CHEST PORTABLE   Final Result   Very low lung volumes. No acute cardiopulmonary process otherwise            **This report has been created using voice recognition software. It may contain minor errors which are inherent in voice recognition technology. **      Final report electronically signed by Dr. Clarence Grande on 2/23/2021 1:45 PM      MRI BRAIN W CONTRAST    (Results Pending)   IR LUMBAR PUNCTURE FOR DIAGNOSIS    (Results Pending)       ED Medications administered this visit:   Medications   0.9 % sodium chloride bolus (500 mLs Intravenous New Bag 2/23/21 1630)   vancomycin 1000 mg IVPB in 250 mL D5W addavial (has no administration in time range)   acyclovir (ZOVIRAX) 10 mg/kg in dextrose 5 % 250 mL IVPB (has no administration in time range)   lidocaine-EPINEPHrine 2 percent-1:279944 injection 20 mL (has no administration in time range)   cefTRIAXone (ROCEPHIN) 2000 mg IVPB in D5W 50ml minibag (has no administration in time range)         FINAL DISPOSITION     Final diagnoses: Altered mental status, unspecified altered mental status type   Carboxyhemoglobinemia, accidental or unintentional, initial encounter     Condition: condition: stable  Dispo: Admit to telemetry      This transcription was electronically signed. Parts of this transcriptions may have been dictated by use of voice recognition software and electronically transcribed, and parts may have been transcribed with the assistance of an ED scribe. The transcription may contain errors not detected in proofreading. Please refer to my supervising physician's documentation if my documentation differs.     Electronically Signed: Cory Esteves, 02/23/21, 4:50 PM       Cory Esteves DO  Resident  02/23/21 HEIKE Keith

## 2021-02-23 NOTE — CONSULTS
NEUROLOGY CONSULT NOTE      Requesting Physician: Delaney Mackenzie DO    Reason for Consult:  Evaluate for altered mental status    History of Present Illness:  Anni Salazar is a 60 y.o. male admitted to [facility] on 2021. Patient has history of depression, gout, dyslipidemia and hypertension. He also has diabetes and related neuropathy. Last known normal was 2 days back. Patient was brought in today with altered mental status and unable to follow commands. He is arousable but lethargic. He has some tremulousness of his bilateral extremities and jaw. Patient has movements in bilateral extremities but the right upper and lower extremity appears to be less active compared to the left. Patient is a poor historian given his altered mental status. Unable to reach family. Cannot review systems because of altered mental status    Past Medical History:        Diagnosis Date    Depression     Gout     Hyperlipidemia     Hypertension     Neuropathy     Type II or unspecified type diabetes mellitus without mention of complication, not stated as uncontrolled            Procedure Laterality Date    BRONCHOSCOPY N/A 3/8/2018    BRONCHOSCOPY ALVEOLAR LAVAGE performed by See Parker MD at [facility]    TONSILLECTOMY         Allergies:     Allergies   Allergen Reactions    Aripiprazole Other (See Comments)     Pain in lower back    Citalopram Hydrobromide Other (See Comments)     Muscle aches and pain        Current Medications:       0.9 % sodium chloride bolus, Once         Social History:  Social History     Tobacco Use   Smoking Status Former Smoker    Quit date: 11/15/1991    Years since quittin.2   Smokeless Tobacco Never Used     Social History     Substance and Sexual Activity   Alcohol Use Yes    Comment: one ddrink on new years and thats it     Social History     Substance and Sexual Activity   Drug Use No     Single    Family History:       Problem Relation Age of Onset    Heart Attack Mother     Diabetes Mother     Lung Cancer Father     Cancer Maternal Uncle     Cancer Paternal Grandmother     Diabetes Paternal Grandmother     Parkinsonism Paternal Uncle        Review of Systems:  All systems reviewed and are all negative except what is mentioned in history of present illness. Physical Exam:  BP (!) 189/82   Pulse 97   Temp 98.5 °F (36.9 °C)   Resp 28   SpO2 98%  I There is no height or weight on file to calculate BMI. I   Wt Readings from Last 1 Encounters:   05/21/20 (!) 339 lb (153.8 kg)           General:  pleasant in no acute distress. HEENT: No pallor or icterus. Neck supple. No Carotid bruits. Heart: S1 and S2 normal with regular rhythm. No murmur. GENERAL NEUROLOGIC EXAM     Mental Status: Lethargic, disoriented occasionally  follow commands. Language, memory, orientation, attention and concentration cannot be assessed as patient does not participate. Cranial nerves:  Fundi were normal bilaterally. Visual fields cannot be performed. Pupils are equal regular and reactive to light. Mild left gaze deviation. Rest of the cranial nerves difficult examine as patient does not participate. Motor: Brisk movements in the left upper and lower extremity. Slightly reduced movements on the right upper and lower extremity. Sensation: Withdraws briskly to pain on the left but reduced on the right    Reflexes: 1+ bilaterally symmetrical with down going toes.     Coordination and gait cannot be performed as patient does not participate        Labs:    CBC:   Recent Labs     02/23/21  1351   WBC 11.9*   HGB 17.0      MCV 95.2*   MCH 30.1   MCHC 31.6*     CMP:  Recent Labs     02/23/21  1351   *   K 4.2      CO2 19*   BUN 14   CREATININE 1.0   LABGLOM 75*   GLUCOSE 233*   CALCIUM 9.0     Liver:   Recent Labs     02/23/21  1351   AST 22   ALT 12   ALKPHOS 82   PROT 7.1   LABALBU 3.2*   BILITOT 0.5     Stroke Labs: Recent Labs     02/23/21  1351   TSH 2.110       Imaging:  No results found for this or any previous visit. No results found for this or any previous visit. No results found for this or any previous visit. No results found for this or any previous visit. No results found for this or any previous visit. No results found for this or any previous visit. No results found for this or any previous visit. Results for orders placed during the hospital encounter of 02/23/21   CT HEAD WO CONTRAST    Narrative PROCEDURE: CT HEAD WO CONTRAST    CLINICAL INFORMATION: ams. COMPARISON: Head CT 3/7/2018. TECHNIQUE: Noncontrast 5 mm axial images were obtained through the brain. Sagittal and coronal reconstructions were obtained. All CT scans at this facility use dose modulation, iterative reconstruction, and/or weight-based dosing when appropriate to reduce radiation dose to as low as reasonably achievable. FINDINGS:    No hydrocephalus, midline shift, extra-axial fluid collection or intracranial hemorrhage. Sequelae of mild chronic microvascular ischemic disease. Incidental note of intracranial vascular calcification. Mastoids and middle ears are clear. Likely cerumen within the right external auditory canal. Mucous retention cyst of the right maxillary sinus. Status post left lens replacement. The orbits are intact. Calvarium and skull base are intact. 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 Bilateral degenerative changes of the temporomandibular joints. Impression  No acute intracranial process. **This report has been created using voice recognition software. It may contain minor errors which are inherent in voice recognition technology. **    Final report electronically signed by Dr. Efren Zuleta on 2/23/2021 2:54 PM       We reviewed the patient records and available information in the EHR     Active Problems:    * No active hospital problems.  *  Resolved Problems:

## 2021-02-24 LAB
CHARACTER, CSF: CLEAR
CHOLESTEROL, TOTAL: 167 MG/DL (ref 100–199)
COLOR CSF: COLORLESS
CRYPTOCOCCUS NEOFORMANS/GATTI CSF FILM ARR.: NOT DETECTED
CYTOMEGALOVIRUS (CMV) CSF FILM ARRAY: NOT DETECTED
ENTEROVIRUS DETECTION PCR: NOT DETECTED
ESCHERICHIA COLI K1 CSF FILM ARRAY: NOT DETECTED
GLUCOSE BLD-MCNC: 151 MG/DL (ref 70–108)
GLUCOSE BLD-MCNC: 198 MG/DL (ref 70–108)
GLUCOSE BLD-MCNC: 211 MG/DL (ref 70–108)
GLUCOSE BLD-MCNC: 248 MG/DL (ref 70–108)
GLUCOSE BLD-MCNC: 259 MG/DL (ref 70–108)
HAEMOPHILUS INFLUENZA CSF FILM ARRAY: NOT DETECTED
HDLC SERPL-MCNC: 38 MG/DL
HHV-6 (HERPESVIRUS 6) CSF FILM ARRAY: NOT DETECTED
HSV-1 CSF FILM ARRAY: NOT DETECTED
HSV-2 CSF FILM ARRAY: NOT DETECTED
LDL CHOLESTEROL CALCULATED: 89 MG/DL
LISTERIA MONOCYTOGENES CSF FILM ARRAY: NOT DETECTED
LYMPHS CSF: 27 % (ref 0–90)
METAYELO CSF: 3 %
MONOCYTE, CSF: 8 % (ref 0–45)
NEISSERIA MENIGITIDIS CSF FILM ARRAY: NOT DETECTED
ORGANISM: ABNORMAL
PARECHOVIRUS CSF FILM ARRAY: NOT DETECTED
PATHOLOGIST REVIEW: ABNORMAL
RBC CSF: 19 /CUMM
SARS-COV-2, NAAT: NOT DETECTED
SEGS, CSF: 62 % (ref 0–6)
STREPTOCOCCUS AGALACTIAE CSF FILM ARRAY: NOT DETECTED
STREPTOCOCCUS PNEUMONIAE CSF FILM ARRAY: NOT DETECTED
TOTAL NUCLEATED CELLS CSF: 8 /CUMM (ref 0–5)
TRIGL SERPL-MCNC: 201 MG/DL (ref 0–199)
TUBE VOLUME RECEIVED CSF: 2 ML
URINE CULTURE REFLEX: ABNORMAL
VARICELLA-ZOSTER, PCR: NOT DETECTED

## 2021-02-24 PROCEDURE — 2580000003 HC RX 258: Performed by: PSYCHIATRY & NEUROLOGY

## 2021-02-24 PROCEDURE — 95816 EEG AWAKE AND DROWSY: CPT

## 2021-02-24 PROCEDURE — 6360000002 HC RX W HCPCS: Performed by: NURSE PRACTITIONER

## 2021-02-24 PROCEDURE — 87635 SARS-COV-2 COVID-19 AMP PRB: CPT

## 2021-02-24 PROCEDURE — 6370000000 HC RX 637 (ALT 250 FOR IP): Performed by: PSYCHIATRY & NEUROLOGY

## 2021-02-24 PROCEDURE — 6370000000 HC RX 637 (ALT 250 FOR IP): Performed by: NURSE PRACTITIONER

## 2021-02-24 PROCEDURE — 99233 SBSQ HOSP IP/OBS HIGH 50: CPT | Performed by: PSYCHIATRY & NEUROLOGY

## 2021-02-24 PROCEDURE — 80061 LIPID PANEL: CPT

## 2021-02-24 PROCEDURE — 82948 REAGENT STRIP/BLOOD GLUCOSE: CPT

## 2021-02-24 PROCEDURE — 99232 SBSQ HOSP IP/OBS MODERATE 35: CPT | Performed by: NURSE PRACTITIONER

## 2021-02-24 PROCEDURE — 95816 EEG AWAKE AND DROWSY: CPT | Performed by: PSYCHIATRY & NEUROLOGY

## 2021-02-24 PROCEDURE — 2060000000 HC ICU INTERMEDIATE R&B

## 2021-02-24 PROCEDURE — 2580000003 HC RX 258: Performed by: NURSE PRACTITIONER

## 2021-02-24 PROCEDURE — 36415 COLL VENOUS BLD VENIPUNCTURE: CPT

## 2021-02-24 PROCEDURE — 6360000002 HC RX W HCPCS: Performed by: PSYCHIATRY & NEUROLOGY

## 2021-02-24 RX ORDER — DEXTROSE MONOHYDRATE 25 G/50ML
12.5 INJECTION, SOLUTION INTRAVENOUS PRN
Status: DISCONTINUED | OUTPATIENT
Start: 2021-02-24 | End: 2021-02-26 | Stop reason: HOSPADM

## 2021-02-24 RX ORDER — LEVETIRACETAM 500 MG/1
500 TABLET ORAL 2 TIMES DAILY
Status: DISCONTINUED | OUTPATIENT
Start: 2021-02-24 | End: 2021-02-26 | Stop reason: HOSPADM

## 2021-02-24 RX ORDER — HYDRALAZINE HYDROCHLORIDE 25 MG/1
25 TABLET, FILM COATED ORAL EVERY 8 HOURS SCHEDULED
Status: DISCONTINUED | OUTPATIENT
Start: 2021-02-24 | End: 2021-02-26 | Stop reason: HOSPADM

## 2021-02-24 RX ORDER — DEXTROSE MONOHYDRATE 50 MG/ML
100 INJECTION, SOLUTION INTRAVENOUS PRN
Status: DISCONTINUED | OUTPATIENT
Start: 2021-02-24 | End: 2021-02-26 | Stop reason: HOSPADM

## 2021-02-24 RX ORDER — NICOTINE POLACRILEX 4 MG
15 LOZENGE BUCCAL PRN
Status: DISCONTINUED | OUTPATIENT
Start: 2021-02-24 | End: 2021-02-26 | Stop reason: HOSPADM

## 2021-02-24 RX ADMIN — LEVETIRACETAM 500 MG: 500 TABLET, FILM COATED ORAL at 13:11

## 2021-02-24 RX ADMIN — METOPROLOL TARTRATE 25 MG: 50 TABLET, FILM COATED ORAL at 20:25

## 2021-02-24 RX ADMIN — ATORVASTATIN CALCIUM 80 MG: 80 TABLET, FILM COATED ORAL at 20:26

## 2021-02-24 RX ADMIN — INSULIN LISPRO 1 UNITS: 100 INJECTION, SOLUTION INTRAVENOUS; SUBCUTANEOUS at 02:29

## 2021-02-24 RX ADMIN — ENOXAPARIN SODIUM 60 MG: 60 INJECTION SUBCUTANEOUS at 20:26

## 2021-02-24 RX ADMIN — ASPIRIN 81 MG: 81 TABLET, COATED ORAL at 10:04

## 2021-02-24 RX ADMIN — LISINOPRIL 20 MG: 20 TABLET ORAL at 10:03

## 2021-02-24 RX ADMIN — LEVETIRACETAM 500 MG: 500 TABLET, FILM COATED ORAL at 20:26

## 2021-02-24 RX ADMIN — VANCOMYCIN HYDROCHLORIDE 1500 MG: 5 INJECTION, POWDER, LYOPHILIZED, FOR SOLUTION INTRAVENOUS at 04:47

## 2021-02-24 RX ADMIN — ACYCLOVIR SODIUM 750 MG: 50 INJECTION, SOLUTION INTRAVENOUS at 02:20

## 2021-02-24 RX ADMIN — FERROUS SULFATE TAB 325 MG (65 MG ELEMENTAL FE) 325 MG: 325 (65 FE) TAB at 10:04

## 2021-02-24 RX ADMIN — INSULIN LISPRO 1 UNITS: 100 INJECTION, SOLUTION INTRAVENOUS; SUBCUTANEOUS at 07:59

## 2021-02-24 RX ADMIN — Medication: at 11:10

## 2021-02-24 RX ADMIN — ENOXAPARIN SODIUM 60 MG: 60 INJECTION SUBCUTANEOUS at 10:04

## 2021-02-24 RX ADMIN — FERROUS SULFATE TAB 325 MG (65 MG ELEMENTAL FE) 325 MG: 325 (65 FE) TAB at 16:26

## 2021-02-24 RX ADMIN — ACYCLOVIR SODIUM 750 MG: 50 INJECTION, SOLUTION INTRAVENOUS at 10:04

## 2021-02-24 RX ADMIN — PANTOPRAZOLE SODIUM 40 MG: 40 TABLET, DELAYED RELEASE ORAL at 06:55

## 2021-02-24 RX ADMIN — METOPROLOL TARTRATE 25 MG: 50 TABLET, FILM COATED ORAL at 10:04

## 2021-02-24 RX ADMIN — HYDRALAZINE HYDROCHLORIDE 25 MG: 25 TABLET, FILM COATED ORAL at 21:36

## 2021-02-24 RX ADMIN — SODIUM CHLORIDE, PRESERVATIVE FREE 10 ML: 5 INJECTION INTRAVENOUS at 10:04

## 2021-02-24 RX ADMIN — HYDRALAZINE HYDROCHLORIDE 25 MG: 25 TABLET, FILM COATED ORAL at 16:26

## 2021-02-24 RX ADMIN — CEFTRIAXONE SODIUM 2000 MG: 2 INJECTION, POWDER, FOR SOLUTION INTRAMUSCULAR; INTRAVENOUS at 04:10

## 2021-02-24 RX ADMIN — SERTRALINE 50 MG: 50 TABLET, FILM COATED ORAL at 10:04

## 2021-02-24 RX ADMIN — SODIUM CHLORIDE, PRESERVATIVE FREE 10 ML: 5 INJECTION INTRAVENOUS at 02:00

## 2021-02-24 RX ADMIN — INSULIN LISPRO 3 UNITS: 100 INJECTION, SOLUTION INTRAVENOUS; SUBCUTANEOUS at 13:12

## 2021-02-24 RX ADMIN — AMLODIPINE BESYLATE 10 MG: 10 TABLET ORAL at 10:04

## 2021-02-24 ASSESSMENT — PAIN SCALES - GENERAL
PAINLEVEL_OUTOF10: 0
PAINLEVEL_OUTOF10: 0

## 2021-02-24 NOTE — CONSULTS
CONSULTATION NOTE :ID       Patient - Anais Beauchamp,  Age - 60 y.o.    - 1955      Room Number - 4A-04/004-A   N -  [id number]   Acct # - [de-identified]  Date of Admission -  2021  1:06 PM  Patient's PCP: REI Thompson NP     Requesting Physician: REI Eden - CNP    REASON FOR CONSULTATION   Rule out meningitis. CHIEF COMPLAINT   In mental state    HISTORY OF PRESENT ILLNESS       This is a very pleasant 60 y.o. male who was admitted to the hospital with a chief complaints of injury mental state. Is morbidly obese male patient with past medical history deep pressure and gout hypertension and diabetes presented with change in mental state. He was found to be confused and did not follow commands very lethargic on arousal.  He was admitted to the hospital there was concern for meningitis he was placed on IV antibiotics and he had lumbar puncture. His white cell was 8. The meningitis encephalitis panel was negative. Since admission patient has been back to his baseline. He denies any fever or chills he does not have any headache photophobia or neck pain. He admits of using marijuana he denies alcohol use he has history of obstructive sleep apnea. He lives with his family twin brother, another brother with his girlfriend.   PAST MEDICAL  HISTORY       Past Medical History:   Diagnosis Date    Depression     Gout     Hyperlipidemia     Hypertension     Neuropathy     Pneumonia     Type II or unspecified type diabetes mellitus without mention of complication, not stated as uncontrolled        PAST SURGICAL HISTORY     Past Surgical History:   Procedure Laterality Date    BRONCHOSCOPY N/A 3/8/2018    BRONCHOSCOPY ALVEOLAR LAVAGE performed by Raul Benson MD at  [facility]    TONSILLECTOMY           MEDICATIONS:       Scheduled Meds:   levETIRAcetam  500 mg Oral BID    vancomycin  1,500 mg Intravenous Q12H    cefTRIAXone (ROCEPHIN) IV 2,000 mg Intravenous Q12H    insulin lispro  0-6 Units Subcutaneous Q6H    amLODIPine  10 mg Oral Daily    aspirin EC  81 mg Oral Daily    atorvastatin  80 mg Oral Nightly    ferrous sulfate  325 mg Oral BID WC    pantoprazole  40 mg Oral QAM AC    lisinopril  20 mg Oral Daily    metoprolol tartrate  25 mg Oral BID    sertraline  50 mg Oral Daily    sodium chloride flush  10 mL Intravenous 2 times per day    enoxaparin  60 mg Subcutaneous Q12H    vancomycin (VANCOCIN) intermittent dosing (placeholder)   Other RX Placeholder     Continuous Infusions:   sodium chloride 50 mL/hr at 02/23/21 2334     PRN Meds:sodium chloride flush, promethazine **OR** ondansetron, polyethylene glycol, acetaminophen **OR** acetaminophen  Allergies:   ALLERGIES:    Aripiprazole, Aripiprazole, and Citalopram hydrobromide        SOCIAL HISTORY:     TOBACCO:   reports that he quit smoking about 29 years ago. He has never used smokeless tobacco.     ETOH:   reports current alcohol use. Patient currently lives with family       FAMILY HISTORY:         Problem Relation Age of Onset    Heart Attack Mother     Diabetes Mother     Lung Cancer Father     Cancer Maternal Uncle     Cancer Paternal Grandmother     Diabetes Paternal Grandmother     Parkinsonism Paternal Uncle        REVIEW OF SYSTEMS:     Constitutional: no fever, no night sweats, no fatigue, no weight loss. Head: no head ache , no head injury, no migranes. Eye: no eye discharge, blurring of vision, no double vision,no eye pain. Ears: no hearing difficulty, no tinnitus  Mouth/throat: no ulceration, dental caries , dysphagia, no hoarseness and voice change  Respiratory: no cough no chest pain,no shortness of breath,no wheezing  CVS: no palpitation, no chest pain,   GI: no abdominal pain, no nausea , no vomiting, no constipation,no diarrhea.   RENETTA: no dysuria, frequency and urgency, no hematuria, no kidney stones  Musculoskeletal: no joint pain, swelling , stiffness,  Endocrine: no polyuria, polydipsia, no cold or heat intolerance  Hematology: no anemia, no easy brusing or bleeding, no hx of clotting disorder  Dermatology: no skin rash, no skin lesions, no pruritis,  Neurological:no headaches,no dizziness, no seizure, no numbness. Psychiatry: no depression, no anxiety,no panic attacks, no suicide ideation    PHYSICAL EXAM:     BP (!) 172/78   Pulse 72   Temp 98.7 °F (37.1 °C) (Oral)   Resp 20   Ht 5' 10\" (1.778 m)   Wt (!) 372 lb 1.6 oz (168.8 kg)   SpO2 95%   BMI 53.39 kg/m²   General apperance:  Awake, alert, not in distress. Obese  HEENT: pink conjunctiva, unicteric sclera, moist oral mucosa. Chest: Bilateral air entry  Cardiovascular:  RRR ,S1S2, no murmur or gallop. Abdomen:  Soft, non tender to palpation. Extremities: No skin rash  Skin:  Warm and dry. CNS: Oriented to person place and time. LABS:     CBC:   Recent Labs     02/23/21  1351   WBC 11.9*   HGB 17.0        BMP:    Recent Labs     02/23/21  1351   *   K 4.2      CO2 19*   BUN 14   CREATININE 1.0   GLUCOSE 233*     Calcium:  Recent Labs     02/23/21  1351   CALCIUM 9.0     Ionized Calcium:No results for input(s): IONCA in the last 72 hours. Magnesium:No results for input(s): MG in the last 72 hours. Phosphorus:No results for input(s): PHOS in the last 72 hours. BNP:No results for input(s): BNP in the last 72 hours. Glucose:  Recent Labs     02/24/21  0228 02/24/21  0758 02/24/21  1204   POCGLU 151* 198* 259*     HgbA1C: No results for input(s): LABA1C in the last 72 hours. INR: No results for input(s): INR in the last 72 hours. Hepatic:   Recent Labs     02/23/21  1351   ALKPHOS 82   ALT 12   AST 22   PROT 7.1   BILITOT 0.5   BILIDIR <0.2   LABALBU 3.2*     Amylase and Lipase:No results for input(s): LACTA, AMYLASE in the last 72 hours. Lactic Acid: No results for input(s): LACTA in the last 72 hours.   Troponin:   Recent Labs     02/23/21  1500   CKTOTAL 121     BNP: No results for input(s): BNP in the last 72 hours. Lipids:   Recent Labs     02/24/21  0345   CHOL 167   TRIG 201*   HDL 38   LDLCALC 89     ABGs: No results found for: PHART, PO2ART, CXB4MTI, AHI7NEQ, BEART    Cultures:      CXR:       UA:   Recent Labs     02/23/21  1627   PHUR 7.5   COLORU YELLOW   PROTEINU >= 300*   BLOODU MODERATE*   RBCUA 3-5   WBCUA 10-15   BACTERIA NONE   NITRU NEGATIVE   GLUCOSEU 500*   BILIRUBINUR NEGATIVE   UROBILINOGEN 0.2   KETUA 40*         IMAGING:    Micro:   Lab Results   Component Value Date    BC No growth-preliminary  02/23/2021       Problem list of patient      Patient Active Problem List   Diagnosis Code    Gout M10.9    Diabetes mellitus type 1, uncontrolled ([facility] [address] E10.65    Hypertension I10    Arthritis M19.90    Cellulitis of toe, left L03.032    Pyogenic arthritis of foot ([facility]) M00.9    Hyperlipidemia E78.5    GERD (gastroesophageal reflux disease) K21.9    Sepsis ([facility]) A41.9    Community acquired pneumonia J18.9    Diabetic ketoacidosis without coma associated with type 2 diabetes mellitus ([facility]) E11.10    Diabetic ketoacidosis without coma associated with type 1 diabetes mellitus ([facility]) E10.10    Acute respiratory failure with hypoxemia ([facility]) J96.01    Symptomatic anemia D64.9    Microcytic anemia D50.9    Anasarca R60.1    Abnormal thyroid function test R94.6    Type 2 diabetes mellitus with other specified complication ([facility]) F30.67    Anxiety and depression F41.9, F32.9    Family history of tobacco use Z81.2    Morbid obesity ([facility]) E66.01    Excoriation of lower leg, sequela S80.819S    Iron deficiency anemia D50.9    Acute diastolic (congestive) heart failure ([facility]) I50.31    Nocturnal hypoxia G47.34    AMS (altered mental status) R41.82    Encephalopathy G93.40           Impression and Recommendation:   Encephalopathy: Resolved  CSF findings not consistent with bacterial infection. His meningitis panel is negative.   We will stop antibiotic  Diabetes with neuropathy. Discussed with hospitalist.    Thank you REI Sen - CNP for allowing me to participate in this patient's care.     Aysha Rees MD,FACP 2/24/2021 2:36 PM

## 2021-02-24 NOTE — CARE COORDINATION
DISCHARGE/PLANNING EVALUATION  2/24/21, 2:12 PM EST    Reason for Referral: Possible ECF at discharge per patient. Mental Status: Alert and oriented to person, place and time. Decision Making: Independent with decision making. Family/Social/Home Environment: Lives at home with two brothers Ruby Esparza and Betty Lott. Neither of them is in good health. His nephew Estrella Spaulding also lives with them. Patient reports that Estrella Spaulding is \"slow\". Current Services including food security, transportation and housekeeping: No current services. Chemo Flores does the shopping and helps with cooking and cleaning. He gets transportation to his appointments through [employer]. Current Equipment:rollator walker, bedside commode, shower bench, the brothers also share a wheelchair. Payment Source:[employer]. Concerns or Barriers to Discharge: Patient's support system is not very good. Post acute provider list with quality measures, geographic area and applicable managed care information provided. Questions regarding selection process answered: Will give ECF list when patient is more ready to discuss. Teach Back Method used with Estrella Spaulding regarding care plan and discharge planning. Patient verbalized understanding of the plan of care and contribute to goal setting. Patient goals, treatment preferences and discharge plan: Estrella Spaulding is unsure of discharge plans at this time. SW suggested SNF for rehab at discharge and he was very hesitant. He is agreeable to discuss after he is seen by PT and OT and they make their recommendations. MARLENY will continue to follow. Electronically signed by CEASAR Recinos on 2/24/2021 at 2:12 PM

## 2021-02-24 NOTE — PROGRESS NOTES
Hospitalist Progress Note      Patient:  Zain Hernández    Unit/Bed:4A-04/004-A  YOB: 1955  MRN: [id number]   Acct: [de-identified]   PCP: REI Herrmann NP  Date of Admission: 2/23/2021    Assessment/Plan:    1. Acute metabolic encephalopathy: Etiology unclear. CVA vs Seizure, Meningitis less likely. CT of the head non acute. LP demonstrated CSF positive for 8 total nucleated cells, predominantly neutrophils. ID evaluated, low index of suspicion for meningitis at this point, antibiotics were discontinued, appreciate assistance. MRI and EEG to be completed. Neuro checks every 4 hours. PT/OT/SLP to see. Neurology evaluated. UDS positive for cannabinoids. Hold Home Gabapentin and Vistaril. Plan for MRI in the morning. 2. Leukocytosis: Procal negative. UA negative for infection. CXR negative. 3. Type 2 diabetes mellitus: Hold home orals and basal bolus regimen. Hemoglobin A1c 6.1 on 4/17/2020, repeat pending. Continue Accu-Cheks before meals and at bedtime with sliding scale insulin coverage. Hypoglycemia protocol in place, maintain carb controlled diet. 4. Primary hypertension: History, stable. Currently normotensive. Continue home regimen. 5. Head lice: Nix shampoo ordered, re-evaluate tomorrow morning. 6. Chronic diastolic failure: No overt signs of decompensation. Hold Lasix. Continue daily weights and maintain strict intake and output. Continue BB and ACE. 7. Hyperlipidemia: Continue high intensity statin. Fasting lipid panel stable, triglycerides high-normal.   8. GERD: Continue PPI. 9. Depression and anxiety: Continue Zoloft. 10. Morbid obesity: BMI 53.39    Chief Complaint: AMS    Initial H and P:-    **Per Chart Review:  \"59 y.o. male who [facility]  with AMS. Brother called EMS today. Please note this HPI is difficult to obtain. Patient is unable to provide information, no family at bedside. Information was gathered via chart review and report. Last known well is conflicting ED resident notes Nat Gaby at 0200 today, Neurology reports LKW was 2 days ago. On presentation to the ED he is lethargic but arouses. Does not follow commands. Tremor noted in the extremities, tongue and jaw. He withdrawals to pain. Does not answer interview questions and unable to obtain ROS. Gaze preference to the left. Right side appears more weak than left. In the ED CT was obtained and non acute. Seen by neurology. LP was attempted in ED and not successful. He was taken to IR for LP. He has been given Acyclovir, Rocephin and Vancomycin. \"    Subjective (past 24 hours):   Patient resting in bed at the time of the interview. Currently denies any physical complaints and denied any issues overnight and into the morning. He was updated on the current plan of care, verbalizes understanding, and had no other needs or questions at this time. Past medical history, family history, social history and allergies reviewed again and is unchanged since admission. ROS (14 point review of systems completed. Pertinent positives noted. Otherwise ROS is negative) :  GENERAL: No fever,chills, or night sweats. SKIN: No lesions or rashes. HEAD: No headaches or recent injury. EYES: No acute changes in vision, no diplopia or blurred vision. EARS: No hearing loss, no tinnitus. NOSE/THROAT: No rhinorrhea or pharyngitis, no nasal drainage. NECK: No lumps or unusual neck stiffness. PULMONARY: Respirations easy and non-labored, no acute distress. CARDIAC: No chest pain, pressure, Negative for lower leg edema. GI: Abdomen is soft and non-tender, non-distended. PERIPHERAL VASCULAR: No intermittent claudication or unusual leg cramps. MUSCULOSKELETAL: Occasional arthralgias, myalgias. NEUROLOGICAL: Denies any headache, near syncope, seizures or syncope. HEMATOLOGIC:  No unusual bruising or bleeding.   PSYCH: Denies any homicidal or suicidial ideations. Medications:  Reviewed    Infusion Medications    dextrose      sodium chloride 50 mL/hr at 02/23/21 2334     Scheduled Medications    levETIRAcetam  500 mg Oral BID    insulin lispro  0-18 Units Subcutaneous TID WC    insulin lispro  0-9 Units Subcutaneous Nightly    hydrALAZINE  25 mg Oral 3 times per day    amLODIPine  10 mg Oral Daily    aspirin EC  81 mg Oral Daily    atorvastatin  80 mg Oral Nightly    ferrous sulfate  325 mg Oral BID WC    pantoprazole  40 mg Oral QAM AC    lisinopril  20 mg Oral Daily    metoprolol tartrate  25 mg Oral BID    sertraline  50 mg Oral Daily    sodium chloride flush  10 mL Intravenous 2 times per day    enoxaparin  60 mg Subcutaneous Q12H     PRN Meds: glucose, dextrose, glucagon (rDNA), dextrose, sodium chloride flush, promethazine **OR** ondansetron, polyethylene glycol, acetaminophen **OR** acetaminophen      Intake/Output Summary (Last 24 hours) at 2/24/2021 1827  Last data filed at 2/24/2021 1713  Gross per 24 hour   Intake 2524.57 ml   Output --   Net 2524.57 ml       Diet:  DIET CARB CONTROL; Exam:  BP (!) 145/67   Pulse 64   Temp 98.7 °F (37.1 °C) (Oral)   Resp 20   Ht 5' 10\" (1.778 m)   Wt (!) 372 lb 1.6 oz (168.8 kg)   SpO2 94%   BMI 53.39 kg/m²     General appearance: Alert and appropriate, pleasant adult male. No apparent distress, appears stated age and cooperative. HEENT: Pupils equal, round, and reactive to light. Conjunctivae/corneas clear. Neck: Supple, with full range of motion. No jugular venous distention. Trachea midline. Respiratory:  Normal respiratory effort. Clear to auscultation, bilaterally without Rales/Wheezes/Rhonchi. Cardiovascular: Regular rate and rhythm with normal S1/S2 without murmurs, rubs or gallops. Abdomen: Soft, non-tender, non-distended with normal bowel sounds. Musculoskeletal: Passive and active ROM x 4 extremities.   Skin: Skin color, texture, turgor normal.  No rashes or lesions. Neurologic:  Neurovascularly intact without any focal sensory/motor deficits. Cranial nerves: II-XII intact, grossly non-focal.  Psychiatric: Alert and oriented to person, place, time, and situation. Thought content appropriate, normal insight  Capillary Refill: Brisk,< 3 seconds   Peripheral Pulses: +2 palpable, equal bilaterally     Labs:   Recent Labs     02/23/21  1351   WBC 11.9*   HGB 17.0   HCT 53.8*        Recent Labs     02/23/21  1351   *   K 4.2      CO2 19*   BUN 14   CREATININE 1.0   CALCIUM 9.0     Recent Labs     02/23/21  1351   AST 22   ALT 12   BILIDIR <0.2   BILITOT 0.5   ALKPHOS 82     No results for input(s): INR in the last 72 hours. Recent Labs     02/23/21  1500   CKTOTAL 121       Microbiology:    Blood culture #1:   Lab Results   Component Value Date    BC No growth-preliminary  02/23/2021       Blood culture #2:No results found for: Prem Watson    Organism:  Lab Results   Component Value Date    ORG Growth of Contaminants   02/23/2021         Lab Results   Component Value Date    LABGRAM  02/23/2021     Rare segmented neutrophils observed. No organisms observed. performed on cytospun specimen        MRSA culture only:No results found for: [facility]    Urine culture:   Lab Results   Component Value Date    LABURIN 200 04/10/2014       Respiratory culture: No results found for: CULTRESP    Aerobic and Anaerobic :  Lab Results   Component Value Date    LABAERO  03/28/2016     Culture also yielded moderate growth of Enterococcus sp. If a true mixed infection is suspected, then broad spectrum  empiric antibiotic therapy is indicated. Current antibiotic therapy may beineffective in vitro for at  least one of culture isolates. LABAERO  03/28/2016     moderate growth  In the treatment of gram positive infections, GENTAMICIN  should be CONSIDERED a SYNERGYSTIC agent ONLY.   Ciprofloxacin and Levofloxacin, regardless of in vitro  sensitivity, should not be used for staphylococcal infections  other than uncomplicated lower UTIs. Moxifloxacin, regardless of in vitro sensitivity, should  not be used for staphylococcal infections. LABAERO very light growth 03/28/2016    LABAERO very light growth 03/28/2016     Lab Results   Component Value Date    LABANAE No growth-preliminary  No growth   03/08/2018       Urinalysis:      Lab Results   Component Value Date    NITRU NEGATIVE 02/23/2021    WBCUA 10-15 02/23/2021    BACTERIA NONE 02/23/2021    RBCUA 3-5 02/23/2021    BLOODU MODERATE 02/23/2021    GLUCOSEU 500 02/23/2021       Radiology:  IR LUMBAR PUNCTURE FOR DIAGNOSIS   Final Result   Status post successful lumbar puncture. Of note, only 7.5 mL of fluid was obtained due to patient's inability to remain still on the table due to mental status change. Opening pressures could also not be obtained for the same reason. **This report has been created using voice recognition software. It may contain minor errors which are inherent in voice recognition technology. **      Final report electronically signed by Dr. Kofi Jensen on 2/23/2021 6:12 PM      CT HEAD WO CONTRAST   Final Result    No acute intracranial process. **This report has been created using voice recognition software. It may contain minor errors which are inherent in voice recognition technology. **      Final report electronically signed by Dr. Aaliyah Nunes on 2/23/2021 2:54 PM      XR CHEST PORTABLE   Final Result   Very low lung volumes. No acute cardiopulmonary process otherwise            **This report has been created using voice recognition software. It may contain minor errors which are inherent in voice recognition technology. **      Final report electronically signed by Dr. John Avila on 2/23/2021 1:45 PM      MRI BRAIN W CONTRAST    (Results Pending)     Ct Head Wo Contrast    Result Date: 2/23/2021  PROCEDURE: CT HEAD WO CONTRAST CLINICAL INFORMATION: ams.  COMPARISON: Head CT INFORMATION:  altered mental status PERFORMED BY: Scotty Grayson M.D. APPROACH: Left L4-L5 translaminar NEEDLE: 22-gauge spinal needle FLUID: 7.5 mL clear colorless CSF FLUOROSCOPY TIME: 3.2 FLUOROSCOPIC IMAGES: 9 DOSE (MGY):549 PROCEDURE:  Signed informed consent was obtained prior to performing this procedure. Following local anesthesia and utilizing aseptic technique, a spinal needle was successfully inserted into the lumbar thecal sac at the level listed above. The amount of spinal fluid listed above was obtained in four separate vials, and sent to the laboratory for diagnostic testing. Status post successful lumbar puncture. Of note, only 7.5 mL of fluid was obtained due to patient's inability to remain still on the table due to mental status change. Opening pressures could also not be obtained for the same reason. **This report has been created using voice recognition software. It may contain minor errors which are inherent in voice recognition technology. ** Final report electronically signed by Dr. Isha العلي on 2/23/2021 6:12 PM      **This report has been created using voice recognition software. It may contain minor errors which are inherent in voice recognition technology. **  Electronically signed by REI Quintero CNP on 2/24/2021 at 6:27 PM

## 2021-02-24 NOTE — PROGRESS NOTES
EEG tech notified this RN she observed bugs in patient's hair while attaching EEG wires. This RN further inspected patient's hair and found many live bugs on scalp and in hair. This RN notified Michele Shelton CNP, order received for Nix hair shampoo. Shampoo applied at 11:10 (see eMAR). Shampoo allowed to sit for 10mins prior to rinsing out. Hair combed with fine tooth comb and many dead bugs removed from scalp and hair. All linens removed from room, replaced with new.

## 2021-02-24 NOTE — PROGRESS NOTES
Sushil Denson 61  PHYSICAL THERAPY MISSED TREATMENT NOTE  [facility]    Date: 2021  Patient Name: Zain Hernández        MRN: [id number]   : 1955  (60 y.o.)  Gender: male                REASON FOR MISSED TREATMENT:  Hold treatment per nursing request.  Pt going for MRI soon, will check back tomorrow.

## 2021-02-24 NOTE — PROCEDURES
[facility] Laboratory Technician Worksheet      EEG Date: 2021    Name: Angelia Loaiza   : 1955   Age: 60 y.o. SEX: male    ROOM: 1a1 MRN: [id number]           CSN: [id number]      Ordering Provider: Eugene Rossi  EEG Number: [id number] Time of Test:  0727    Hand: Right   Sedation: no    H.V. Done: No age protocol Photic: Yes    Sleep: No  Drowsy: Yes   Sleep Deprived: No    Seizures observed: no    Mentality: patient follows command but reminding needed    Clinical History:altered mental status, bilateral extrem and jaw tremors, on neurontin. CT  Impression    No acute intracranial process. MRI pending. IR lumbar puncture    Impression   Status post successful lumbar puncture. Of note, only 7.5 mL of fluid was obtained due to patient's inability to remain still on the table due to mental status change.  Opening pressures could also not be obtained for the same reason.           Past Medical History:       Diagnosis Date    Depression     Gout     Hyperlipidemia     Hypertension     Neuropathy     Pneumonia     Type II or unspecified type diabetes mellitus without mention of complication, not stated as uncontrolled        Scheduled Meds:   vancomycin  1,500 mg Intravenous Q12H    acyclovir  10 mg/kg (Ideal) Intravenous Q8H    cefTRIAXone (ROCEPHIN) IV  2,000 mg Intravenous Q12H    insulin lispro  0-6 Units Subcutaneous Q6H    amLODIPine  10 mg Oral Daily    aspirin EC  81 mg Oral Daily    atorvastatin  80 mg Oral Nightly    ferrous sulfate  325 mg Oral BID WC    pantoprazole  40 mg Oral QAM AC    lisinopril  20 mg Oral Daily    metoprolol tartrate  25 mg Oral BID    sertraline  50 mg Oral Daily    sodium chloride flush  10 mL Intravenous 2 times per day    enoxaparin  60 mg Subcutaneous Q12H    vancomycin (VANCOCIN) intermittent dosing (placeholder)   Other RX Placeholder     Continuous Infusions:   sodium chloride 50 mL/hr at 21 9714 PRN Meds:.sodium chloride flush, promethazine **OR** ondansetron, polyethylene glycol, acetaminophen **OR** acetaminophen    Technician: Theresa Johnson 2/24/2021

## 2021-02-24 NOTE — PLAN OF CARE
Problem: Pain:  Goal: Control of acute pain  Description: Control of acute pain  Outcome: Ongoing  Note: Patient able to use 0-10 pain scale. Denies pain at this time. Pain complaints as documented. Agreeable to take PRN pain medications. Pain goal of \"no pain\". Problem: Discharge Planning:  Goal: Ability to perform activities of daily living will improve  Description: Ability to perform activities of daily living will improve  Outcome: Ongoing  Note: Patient lives at home with his brother. Social work consulted to assist with discharge planning. Awaiting PT/OT recommendations. Problem: Injury - Risk of, Physical Injury:  Goal: Absence of physical injury  Description: Absence of physical injury  Outcome: Ongoing  Note: Bed in lowest position and locked. Bed alarm activated. Educated on use of call light when in need of assistance- expressed understanding. Visual checks performed and charted. No falls during shift at this time. Armband and falling star in place. Call light within reach. Transfers with 2-3 max assistance, stand pivot to [facility]. Problem: Mood - Altered:  Goal: Mood stable  Description: Mood stable  Outcome: Ongoing  Note: Patient calm, cooperative today. Darryl affect/delayed responses at times. Problem: Skin Integrity:  Goal: Will show no infection signs and symptoms  Description: Will show no infection signs and symptoms  Outcome: Ongoing  Note: Redness/discoloration noted under skin folds. Buttocks noted to have blanchable redness. No signs of new skin breakdown with each assessment. Skin remains warm, dry, intact. Mucous membranes pink & moist. Patient is able to turn self. Problem: Falls - Risk of:  Goal: Absence of physical injury  Description: Absence of physical injury  Outcome: Ongoing  Note: Bed in lowest position and locked. Bed alarm activated. Educated on use of call light when in need of assistance- expressed understanding.   Visual checks performed and charted. No falls during shift at this time. Armband and falling star in place. Call light within reach. Transfers with 2-3 max assistance, stand pivot to [facility]. Problem: Physical Regulation:  Goal: Will remain free from infection  Description: Will remain free from infection  Outcome: Ongoing  Note: Patient placed in contact precautions following diagnosis of head lice. Patient treated with NIX shampoo and hair combed thoroughly. Care plan reviewed with patient and brother, Isabel Baez. Patient and Corita Blower verbalize understanding of the plan of care and contribute to goal setting.

## 2021-02-24 NOTE — CARE COORDINATION
2/24/21, 7:10 AM EST  DISCHARGE PLANNING EVALUATION:    Alejandra Moreira       Admitted: 2/23/2021/ [facility] day: 1   Location: [facility]04/004-A Reason for admit: AMS (altered mental status) [R41.82]   PMH:  has a past medical history of Depression, Gout, Hyperlipidemia, Hypertension, Neuropathy, Pneumonia, and Type II or unspecified type diabetes mellitus without mention of complication, not stated as uncontrolled. Procedure: CXR   Impression:        Very low lung volumes. No acute cardiopulmonary process otherwise      CT Head WO Contrast   Impression:         No acute intracranial process. Lumbar puncture per IR  EEG    Barriers to Discharge:  From ED, telemetry, PT/OT/ST, Neurology consult, neuro checks, IV fluids, IV Acyclovir, IV Rocephin, diabetes management, IV Vancomycin, Pharmacy dosing. PCP: REI Medina NP  Readmission Risk Score: 14%    Patient Goals/Plan/Treatment Preferences: Met with Aylin Ewing. He currently lives at home with his brothers. He uses a walker. Discharge plan is unsure at this time pending clinical course. Aylin Ewing feels that he may need more rehab time prior to returning home. SW consult placed. Will follow. Transportation/Food Security/Housekeeping Addressed:  No issues identified.

## 2021-02-24 NOTE — PROGRESS NOTES
Admit RN tries to call [facility] but the phone number recording says it is no longer in service.   Unable to verify home med list.

## 2021-02-24 NOTE — PROGRESS NOTES
Physician Progress Note      PATIENT:               Kelly Valdes  CSN #:                  [id number]  :                       1955  ADMIT DATE:       2021 1:06 PM  [address] DATE:  RESPONDING  PROVIDER #:        Madi Gunter MD          QUERY TEXT:    Pt admitted with AMS and has encephalopathy documented. If possible, please   document below in progress notes and discharge summary further specificity   regarding the type of encephalopathy:    The medical record reflects the following:  Risk Factors: Leukocytosis. Type 2 diabetes. HTN, Chronic diastolic failure. Clinical Indicators: Acute encephalopathy for evaluation: Rule out CVA   seizures and meningitis. Jose Angel Lopez 2-23 H/P: Acute encephalopathy. Etiology unclear. CVA vs Seizure vs Meningitis. Admit to 4A. Hold Home Gabapentin and Vistaril. NPO until able to pass a bedside swallow evaluation by nursing. Treatment: Neuro constult, -CT of the head non acute. LP at beside in ED   unsuccessful and will be obtained by IR. Follow labs. Continue empiric ATB   started in the ED. MRI and EEG to be completed. Neuro checks every 4 hours. PT/OT/SLP to see. Seen by neurology. Check UDS---Positive for cannabis    Thank you. Please call if you have any questions. (P) [phone]. Signed   by Torsten Zuñiga RN Clinical , CRCR  Options provided:  -- Metabolic encephalopathy  -- Hypertensive encephalopathy  -- Septic encephalopathy  -- Toxic encephalopathy  -- Toxic metabolic encephalopathy  -- Other - I will add my own diagnosis  -- Disagree - Not applicable / Not valid  -- Disagree - Clinically unable to determine / Unknown  -- Refer to Clinical Documentation Reviewer    PROVIDER RESPONSE TEXT:    This patient has septic encephalopathy.     Query created by: Jessica Marshall on 2021 10:45 AM      Electronically signed by:  Madi Gunter MD 2021 11:57 AM

## 2021-02-24 NOTE — PROGRESS NOTES
Sushil Denson 61  OCCUPATIONAL THERAPY MISSED TREATMENT NOTE  [facility] 4A  4A-04/004-A      Date: 2021  Patient Name: Rubi Carpio        CSN: [id number]   : 1955  (60 y.o.)  Gender: male                REASON FOR MISSED TREATMENT: Pt with pending MRI, will hold OT eval for now and try back for next scheduled session.

## 2021-02-24 NOTE — PROGRESS NOTES
81 mg Oral Daily Ashley Walls APRN - CNP   81 mg at 02/23/21 2311    atorvastatin (LIPITOR) tablet 80 mg  80 mg Oral Nightly Ashley Walls APRN - CNP   80 mg at 02/23/21 2312    ferrous sulfate (IRON 325) tablet 325 mg  325 mg Oral BID WC Ashley Walls APRN - CNP   325 mg at 02/23/21 2312    pantoprazole (PROTONIX) tablet 40 mg  40 mg Oral QAM AC REI Spencer - CNP   40 mg at 02/24/21 0655    lisinopril (PRINIVIL;ZESTRIL) tablet 20 mg  20 mg Oral Daily REI Shah - CNP   20 mg at 02/23/21 2311    metoprolol tartrate (LOPRESSOR) tablet 25 mg  25 mg Oral BID REI Shah - CNP   25 mg at 02/23/21 2311    sertraline (ZOLOFT) tablet 50 mg  50 mg Oral Daily Ashley Walls APRN - CNP   50 mg at 02/23/21 2311    sodium chloride flush 0.9 % injection 10 mL  10 mL Intravenous 2 times per day REI Leon - CNP   10 mL at 02/24/21 0200    sodium chloride flush 0.9 % injection 10 mL  10 mL Intravenous PRN Ashley Walls APRN - CNP        enoxaparin (LOVENOX) injection 60 mg  60 mg Subcutaneous Q12H REI Spencer - CNP   60 mg at 02/23/21 2332    promethazine (PHENERGAN) tablet 12.5 mg  12.5 mg Oral Q6H PRN REI Shah - CNP        Or    ondansetron (ZOFRAN) injection 4 mg  4 mg Intravenous Q6H PRN REI Spencer - NITA        polyethylene glycol (GLYCOLAX) packet 17 g  17 g Oral Daily PRN Ashley Walls APRN - CNP        acetaminophen (TYLENOL) tablet 650 mg  650 mg Oral Q6H PRN Dorisann Adrien Paphanchith, APRN - CNP        Or    acetaminophen (TYLENOL) suppository 650 mg  650 mg Rectal Q6H PRN Ashley Walls APRN - CNP        0.9 % sodium chloride infusion   Intravenous Continuous Ashley Walls APRN - CNP 50 mL/hr at 02/23/21 2334 New Bag at 02/23/21 2334    vancomycin (VANCOCIN) intermittent dosing (placeholder)   Other RX Gretchen Junior MD         Allergies   Allergen Reactions    Aripiprazole Other (See Comments)     Pain in lower back    Aripiprazole      Other reaction(s): Abilify, unable to straighten up when taking, unable to straighten up when taking    Citalopram Hydrobromide Other (See Comments)     Muscle aches and pain       PHYSICAL EXAMINATION:    Vitals: No data found. General:  pleasant in no acute distress. HEENT: No pallor or icterus. Neck supple. No Carotid bruits. Heart: S1 and S2 normal with regular rhythm. No murmur. GENERAL NEUROLOGIC EXAM     Mental Status:   Awake alert and oriented to person place and time. Language is normal for comprehension and fluency. Attention span and concentration were normal.       Cranial nerves:      Pupils are equal regular and reactive to light. Muscles of facial expression were strong and symmetric. Jaw tremulousness much better compared to yesterday. Motor: Normal tone and bulk with no involuntary movements or pronator drift. Strength normal 5/5 in bilateral upper and lower extremities. Sensation: Light touch, pin prick, vibration and joint position sense were normal in the upper and lower extremities. Reflexes: 2+ bilaterally symmetrical with down going toes. Coordination: finger nose and heel shin test normal bilaterally. Gait: Normal station and gait. Heel, toe and tandem walk normal.  Romberg negative.            Labs:     CBC:   Recent Labs     02/23/21  1351   WBC 11.9*   HGB 17.0      MCV 95.2*   MCH 30.1   MCHC 31.6*     CMP:  Recent Labs     02/23/21  1351   *   K 4.2      CO2 19*   BUN 14   CREATININE 1.0   LABGLOM 75*   GLUCOSE 233*   CALCIUM 9.0     Liver:   Recent Labs     02/23/21  1351   AST 22   ALT 12   ALKPHOS 82   PROT 7.1   LABALBU 3.2*   BILITOT 0.5     Stroke Labs:   Recent Labs     02/23/21  1351 02/24/21  0345   TSH 2.110  --    LDLCALC  --  89       Imaging:  No results found for this or any previous visit. No results found for this or any previous visit. No results found for this or any previous visit. No results found for this or any previous visit. No results found for this or any previous visit. No results found for this or any previous visit. No results found for this or any previous visit. Results for orders placed during the hospital encounter of 02/23/21   CT HEAD WO CONTRAST    Narrative PROCEDURE: CT HEAD WO CONTRAST    CLINICAL INFORMATION: ams. COMPARISON: Head CT 3/7/2018. TECHNIQUE: Noncontrast 5 mm axial images were obtained through the brain. Sagittal and coronal reconstructions were obtained. All CT scans at this facility use dose modulation, iterative reconstruction, and/or weight-based dosing when appropriate to reduce radiation dose to as low as reasonably achievable. FINDINGS:    No hydrocephalus, midline shift, extra-axial fluid collection or intracranial hemorrhage. Sequelae of mild chronic microvascular ischemic disease. Incidental note of intracranial vascular calcification. Mastoids and middle ears are clear. Likely cerumen within the right external auditory canal. Mucous retention cyst of the right maxillary sinus. Status post left lens replacement. The orbits are intact. Calvarium and skull base are intact. 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 Bilateral degenerative changes of the temporomandibular joints. Impression  No acute intracranial process. **This report has been created using voice recognition software. It may contain minor errors which are inherent in voice recognition technology. **    Final report electronically signed by Dr. Cruz Martínez on 2/23/2021 2:54 PM       Active Problems:    AMS (altered mental status)  Resolved Problems:    * No resolved hospital problems.  *       ASSESSMENT/PLAN:   This is a 60 y.o. male who  has a past medical history of Depression, Gout, Hyperlipidemia, Hypertension, Neuropathy, Pneumonia, and Type II or unspecified type diabetes mellitus without mention of complication, not stated as uncontrolled. He presents with altered mental status. On exam patient is lethargic and has some jaw tremulousness. He also has some gaze preference to the left and mild reduced activity in the right upper and lower extremity. He has some rash on the lower extremity. Patient is much better today.     1. Acute encephalopathy for evaluation: ?  Meningoencephalitis: Lumbar puncture positive for possible bacterial meningitis.    -Antibiotics to continue; ID consult  -MRI brain pending  -EEG abnormal for some delta activity: Start Keppra 500 twice daily    Extremely guarded prognosis. Thank you for allowing us to participate in the care of this patient.     Electronically signed by Santos Verde MD on 2/24/2021 at 8:03 AM

## 2021-02-24 NOTE — PROCEDURES
[address] [place] [address]                          ELECTROENCEPHALOGRAM REPORT    PATIENT NAME: Lolis Tenorio                  :        1955  MED REC NO:   [id number]                           ROOM:       0004  ACCOUNT NO:   [de-identified]                           ADMIT DATE: 2021  PROVIDER:     Tabitha Ibarra. Symone Sharma MD    DATE OF EE2021    REFERRING PROVIDER:  Eino Goodell MD    CLINICAL HISTORY:  This is a 79-year-old male presenting with altered mental  status, bilateral arm and jaw tremor. The patient is on gabapentin. Medications listed are vancomycin, acyclovir, Rocephin, insulin,  amlodipine, atorvastatin, ferrous sulfate, lisinopril, metoprolol,  Lovenox, vancomycin. INTERPRETATION:  This is a 17-channel EEG performed without sleep  deprivation. Hyperventilation was not performed. Photic simulation was  performed. The patient is described as alert, follow commands. Background rhythm activity is noted to be slowed, poorly organized in  the theta, and occasional delta range activity suggestive of cortical  dysfunction such as seen with encephalopathy (example toxic/metabolic in  nature). Clinical correlation is recommended. Intermittent bifrontal  high-voltage delta activity is observed that may represent a paradoxical  cortical dysfunction, or indicate seizure tendency in the proper  clinical context. No clinical seizures were observed. Hyperventilation  was not performed. Light stages of sleep are seen during the recording. Lead artifact was noted. Photic stimulation was performed with no  driving seen. IMPRESSION:  This is an abnormal EEG due to presence of intermittent  high-voltage delta activity that may represent a paradoxical cortical  dysfunction, or indicate seizure tendency in the proper clinical  context. No clinical seizures were observed.   In addition, excessive slowing was seen in theta and delta range activity suggestive of  cortical dysfunction such as seen with encephalopathy.         Malia Stockton MD    D: 02/24/2021 10:58:23       T: 02/24/2021 11:07:17     [email or web address]  Job#: [id number]     Doc#: [id number]    CC:

## 2021-02-25 ENCOUNTER — APPOINTMENT (OUTPATIENT)
Dept: MRI IMAGING | Age: 66
DRG: 052 | End: 2021-02-25
Payer: COMMERCIAL

## 2021-02-25 ENCOUNTER — APPOINTMENT (OUTPATIENT)
Dept: GENERAL RADIOLOGY | Age: 66
DRG: 052 | End: 2021-02-25
Payer: COMMERCIAL

## 2021-02-25 LAB
ANION GAP SERPL CALCULATED.3IONS-SCNC: 12 MEQ/L (ref 8–16)
AVERAGE GLUCOSE: 195 MG/DL (ref 70–126)
BUN BLDV-MCNC: 21 MG/DL (ref 7–22)
CALCIUM SERPL-MCNC: 8.8 MG/DL (ref 8.5–10.5)
CHLORIDE BLD-SCNC: 102 MEQ/L (ref 98–111)
CO2: 23 MEQ/L (ref 23–33)
CREAT SERPL-MCNC: 1.4 MG/DL (ref 0.4–1.2)
EKG ATRIAL RATE: 300 BPM
EKG Q-T INTERVAL: 402 MS
EKG QRS DURATION: 78 MS
EKG QTC CALCULATION (BAZETT): 436 MS
EKG R AXIS: -59 DEGREES
EKG T AXIS: 13 DEGREES
EKG VENTRICULAR RATE: 71 BPM
ERYTHROCYTE [DISTWIDTH] IN BLOOD BY AUTOMATED COUNT: 13.3 % (ref 11.5–14.5)
ERYTHROCYTE [DISTWIDTH] IN BLOOD BY AUTOMATED COUNT: 45.9 FL (ref 35–45)
GFR SERPL CREATININE-BSD FRML MDRD: 51 ML/MIN/1.73M2
GLUCOSE BLD-MCNC: 198 MG/DL (ref 70–108)
GLUCOSE BLD-MCNC: 215 MG/DL (ref 70–108)
GLUCOSE BLD-MCNC: 220 MG/DL (ref 70–108)
GLUCOSE BLD-MCNC: 228 MG/DL (ref 70–108)
GLUCOSE BLD-MCNC: 276 MG/DL (ref 70–108)
HBA1C MFR BLD: 8.5 % (ref 4.4–6.4)
HCT VFR BLD CALC: 48.3 % (ref 42–52)
HEMOGLOBIN: 15.3 GM/DL (ref 14–18)
MCH RBC QN AUTO: 29.8 PG (ref 26–33)
MCHC RBC AUTO-ENTMCNC: 31.7 GM/DL (ref 32.2–35.5)
MCV RBC AUTO: 94 FL (ref 80–94)
PHOSPHORUS: 3.4 MG/DL (ref 2.4–4.7)
PLATELET # BLD: 152 THOU/MM3 (ref 130–400)
PMV BLD AUTO: 11.6 FL (ref 9.4–12.4)
POTASSIUM REFLEX MAGNESIUM: 4 MEQ/L (ref 3.5–5.2)
RBC # BLD: 5.14 MILL/MM3 (ref 4.7–6.1)
SODIUM BLD-SCNC: 137 MEQ/L (ref 135–145)
WBC # BLD: 10.6 THOU/MM3 (ref 4.8–10.8)

## 2021-02-25 PROCEDURE — A9579 GAD-BASE MR CONTRAST NOS,1ML: HCPCS | Performed by: INTERNAL MEDICINE

## 2021-02-25 PROCEDURE — 84100 ASSAY OF PHOSPHORUS: CPT

## 2021-02-25 PROCEDURE — 71045 X-RAY EXAM CHEST 1 VIEW: CPT

## 2021-02-25 PROCEDURE — 70553 MRI BRAIN STEM W/O & W/DYE: CPT

## 2021-02-25 PROCEDURE — 6360000004 HC RX CONTRAST MEDICATION: Performed by: INTERNAL MEDICINE

## 2021-02-25 PROCEDURE — 93010 ELECTROCARDIOGRAM REPORT: CPT | Performed by: NUCLEAR MEDICINE

## 2021-02-25 PROCEDURE — 6370000000 HC RX 637 (ALT 250 FOR IP): Performed by: PSYCHIATRY & NEUROLOGY

## 2021-02-25 PROCEDURE — 2580000003 HC RX 258: Performed by: NURSE PRACTITIONER

## 2021-02-25 PROCEDURE — 99232 SBSQ HOSP IP/OBS MODERATE 35: CPT | Performed by: NURSE PRACTITIONER

## 2021-02-25 PROCEDURE — 2060000000 HC ICU INTERMEDIATE R&B

## 2021-02-25 PROCEDURE — 80048 BASIC METABOLIC PNL TOTAL CA: CPT

## 2021-02-25 PROCEDURE — 85027 COMPLETE CBC AUTOMATED: CPT

## 2021-02-25 PROCEDURE — 6370000000 HC RX 637 (ALT 250 FOR IP): Performed by: NURSE PRACTITIONER

## 2021-02-25 PROCEDURE — 99232 SBSQ HOSP IP/OBS MODERATE 35: CPT | Performed by: PSYCHIATRY & NEUROLOGY

## 2021-02-25 PROCEDURE — 92523 SPEECH SOUND LANG COMPREHEN: CPT

## 2021-02-25 PROCEDURE — 82948 REAGENT STRIP/BLOOD GLUCOSE: CPT

## 2021-02-25 PROCEDURE — 36415 COLL VENOUS BLD VENIPUNCTURE: CPT

## 2021-02-25 PROCEDURE — 6360000002 HC RX W HCPCS: Performed by: NURSE PRACTITIONER

## 2021-02-25 PROCEDURE — 2500000003 HC RX 250 WO HCPCS: Performed by: NURSE PRACTITIONER

## 2021-02-25 PROCEDURE — 93005 ELECTROCARDIOGRAM TRACING: CPT | Performed by: PHYSICIAN ASSISTANT

## 2021-02-25 PROCEDURE — 83036 HEMOGLOBIN GLYCOSYLATED A1C: CPT

## 2021-02-25 RX ADMIN — FERROUS SULFATE TAB 325 MG (65 MG ELEMENTAL FE) 325 MG: 325 (65 FE) TAB at 08:02

## 2021-02-25 RX ADMIN — MICONAZOLE NITRATE: 20 POWDER TOPICAL at 21:16

## 2021-02-25 RX ADMIN — SERTRALINE 50 MG: 50 TABLET, FILM COATED ORAL at 08:02

## 2021-02-25 RX ADMIN — HYDRALAZINE HYDROCHLORIDE 25 MG: 25 TABLET, FILM COATED ORAL at 06:25

## 2021-02-25 RX ADMIN — ACETAMINOPHEN 650 MG: 325 TABLET ORAL at 05:04

## 2021-02-25 RX ADMIN — LEVETIRACETAM 500 MG: 500 TABLET, FILM COATED ORAL at 21:15

## 2021-02-25 RX ADMIN — GADOTERIDOL 20 ML: 279.3 INJECTION, SOLUTION INTRAVENOUS at 12:57

## 2021-02-25 RX ADMIN — ATORVASTATIN CALCIUM 80 MG: 80 TABLET, FILM COATED ORAL at 21:15

## 2021-02-25 RX ADMIN — METOPROLOL TARTRATE 25 MG: 50 TABLET, FILM COATED ORAL at 09:13

## 2021-02-25 RX ADMIN — LEVETIRACETAM 500 MG: 500 TABLET, FILM COATED ORAL at 08:02

## 2021-02-25 RX ADMIN — HYDRALAZINE HYDROCHLORIDE 25 MG: 25 TABLET, FILM COATED ORAL at 10:51

## 2021-02-25 RX ADMIN — ASPIRIN 81 MG: 81 TABLET, COATED ORAL at 09:13

## 2021-02-25 RX ADMIN — PANTOPRAZOLE SODIUM 40 MG: 40 TABLET, DELAYED RELEASE ORAL at 06:25

## 2021-02-25 RX ADMIN — ENOXAPARIN SODIUM 60 MG: 60 INJECTION SUBCUTANEOUS at 09:13

## 2021-02-25 RX ADMIN — LISINOPRIL 20 MG: 20 TABLET ORAL at 08:02

## 2021-02-25 RX ADMIN — FERROUS SULFATE TAB 325 MG (65 MG ELEMENTAL FE) 325 MG: 325 (65 FE) TAB at 18:22

## 2021-02-25 RX ADMIN — HYDRALAZINE HYDROCHLORIDE 25 MG: 25 TABLET, FILM COATED ORAL at 21:15

## 2021-02-25 RX ADMIN — SODIUM CHLORIDE: 9 INJECTION, SOLUTION INTRAVENOUS at 08:05

## 2021-02-25 RX ADMIN — AMLODIPINE BESYLATE 10 MG: 10 TABLET ORAL at 08:02

## 2021-02-25 RX ADMIN — ENOXAPARIN SODIUM 60 MG: 60 INJECTION SUBCUTANEOUS at 21:16

## 2021-02-25 ASSESSMENT — PAIN DESCRIPTION - PROGRESSION: CLINICAL_PROGRESSION: GRADUALLY WORSENING

## 2021-02-25 ASSESSMENT — PAIN SCALES - GENERAL
PAINLEVEL_OUTOF10: 4
PAINLEVEL_OUTOF10: 0
PAINLEVEL_OUTOF10: 5
PAINLEVEL_OUTOF10: 0

## 2021-02-25 ASSESSMENT — PAIN DESCRIPTION - LOCATION: LOCATION: BACK

## 2021-02-25 ASSESSMENT — PAIN DESCRIPTION - ONSET: ONSET: SUDDEN

## 2021-02-25 ASSESSMENT — PAIN DESCRIPTION - DESCRIPTORS: DESCRIPTORS: CRAMPING;ACHING

## 2021-02-25 ASSESSMENT — PAIN DESCRIPTION - PAIN TYPE: TYPE: ACUTE PAIN

## 2021-02-25 NOTE — PLAN OF CARE
Problem: Pain:  Goal: Pain level will decrease  Description: Pain level will decrease  2/25/2021 1440 by Marga Miranda RN  Outcome: Met This Shift  2/25/2021 0102 by Ashby Claude, RN  Outcome: Met This Shift  Note: Patient denied pain with each assessment this shift. Will continue to monitor and provide pain relief measures as needed. Goal: Control of acute pain  Description: Control of acute pain  2/25/2021 1440 by Marga Miranda RN  Outcome: Met This Shift  2/25/2021 0102 by Ashby Claude, RN  Outcome: Met This Shift  Goal: Control of chronic pain  Description: Control of chronic pain  2/25/2021 1440 by Marga Miranda RN  Outcome: Met This Shift  2/25/2021 0102 by Ashby Claude, RN  Outcome: Met This Shift     Problem: Injury - Risk of, Physical Injury:  Goal: Absence of physical injury  Description: Absence of physical injury  2/25/2021 1440 by Marga Miranda RN  Outcome: Met This Shift  2/25/2021 0102 by Ashby Claude, RN  Outcome: Ongoing  Goal: Will remain free from falls  Description: Will remain free from falls  2/25/2021 1440 by Marga Miranda RN  Outcome: Met This Shift  2/25/2021 0102 by Ashby Claude, RN  Outcome: Ongoing  Note: Patient remained free from falls this shift. Bed is in low position with alarm on and siderails up x2. Education given on use of call light and patient voiced understanding. Call light and beside table within reach. Arm band and falling star in place. Hourly rounds completed. Will continue to monitor.        Problem: Mood - Altered:  Goal: Mood stable  Description: Mood stable  2/25/2021 1440 by Marga Miranda RN  Outcome: Met This Shift  2/25/2021 0102 by Ashby Claude, RN  Outcome: Ongoing  Goal: Absence of abusive behavior  Description: Absence of abusive behavior  2/25/2021 1440 by Marga Miranda RN  Outcome: Met This Shift  2/25/2021 0102 by Ashby Claude, RN  Outcome: Met This Shift  Goal: Verbalizations of feeling emotionally comfortable while being cared for will increase  Description: Verbalizations of feeling emotionally comfortable while being cared for will increase  2/25/2021 1440 by Carlos Fajardo RN  Outcome: Met This Shift  2/25/2021 0102 by Arely Giles RN  Outcome: Met This Shift  Note: Patient expresses his confidence in care and is appreciative of nursing care. Problem: Sensory Perception - Impaired:  Goal: Demonstrations of improved sensory functioning will increase  Description: Demonstrations of improved sensory functioning will increase  2/25/2021 1440 by Carlos Fajardo RN  Outcome: Met This Shift  2/25/2021 0102 by Arely Giles RN  Outcome: Met This Shift  Goal: Decrease in sensory misperception frequency  Description: Decrease in sensory misperception frequency  2/25/2021 1440 by Carlos Fajardo RN  Outcome: Met This Shift  2/25/2021 0102 by Arely Giles RN  Outcome: Met This Shift  Note: Patient has experienced no sensory misperceptions during shift. Goal: Able to refrain from responding to false sensory perceptions  Description: Able to refrain from responding to false sensory perceptions  2/25/2021 1440 by Carlos Fajardo RN  Outcome: Met This Shift  2/25/2021 0102 by Arely Giles RN  Outcome: Met This Shift  Goal: Demonstrates accurate environmental perceptions  Description: Demonstrates accurate environmental perceptions  2/25/2021 1440 by Carlos Fajardo RN  Outcome: Met This Shift  2/25/2021 0102 by Arely Giles RN  Outcome: Met This Shift     Problem: Skin Integrity:  Goal: Absence of new skin breakdown  Description: Absence of new skin breakdown  2/25/2021 1440 by Carlos Fajardo RN  Outcome: Met This Shift  2/25/2021 0102 by Arely Giles RN  Outcome: Ongoing  Note: No signs of new skin breakdown noted with each assessment this shift. Skin warm, dry, and intact except where otherwise noted in head-to-toe assessment.  Mucous membranes pink and moist. Assistance with turns/ambulation given as needed. Problem: Falls - Risk of:  Goal: Absence of physical injury  Description: Absence of physical injury  2/25/2021 1440 by Napoleon Alegre RN  Outcome: Met This Shift  2/25/2021 0102 by Katie Parr RN  Outcome: Ongoing  Goal: Will remain free from falls  Description: Will remain free from falls  2/25/2021 1440 by Napoleon Alegre RN  Outcome: Met This Shift  2/25/2021 0102 by Katie Parr RN  Outcome: Ongoing  Note: Patient remained free from falls this shift. Bed is in low position with alarm on and siderails up x2. Education given on use of call light and patient voiced understanding. Call light and beside table within reach. Arm band and falling star in place. Hourly rounds completed. Will continue to monitor.

## 2021-02-25 NOTE — CARE COORDINATION
2/25/21, 11:44 AM EST    DISCHARGE ON GOING EVALUATION    Abraham Silver day: 2  Location: -04/004-A Reason for admit: AMS (altered mental status) [R41.82]   Procedure:  2/23 Lumbar Puncture per IR     2/25 CXR   Impression:        Stable radiographic appearance of the chest. No evidence of an acute process. MRI Brain pending. Barriers to Discharge: ID, Neurology following, PT/OT, IV fluids, Lovenox, diabetes management, Keppra, anti viral, antibiotics stopped. PCP: REI Khoury NP  Readmission Risk Score: 17%  Patient Goals/Plan/Treatment Preferences: from home. Plan is return vs [facility] for rehab, poss HH? Will follow PT/OT recommendations for potential discharge needs. SW following.

## 2021-02-25 NOTE — PLAN OF CARE
sensory misperception frequency  Description: Decrease in sensory misperception frequency  2/25/2021 0102 by Blas Hernandez RN  Outcome: Met This Shift  Note: Patient has experienced no sensory misperceptions during shift. Problem: Sensory Perception - Impaired:  Goal: Able to refrain from responding to false sensory perceptions  Description: Able to refrain from responding to false sensory perceptions  2/25/2021 0102 by Blas Hernandez RN  Outcome: Met This Shift     Problem: Sensory Perception - Impaired:  Goal: Demonstrates accurate environmental perceptions  Description: Demonstrates accurate environmental perceptions  2/25/2021 0102 by Blas Hernandez RN  Outcome: Met This Shift     Problem: Sensory Perception - Impaired:  Goal: Able to distinguish between reality-based and nonreality-based thinking  Description: Able to distinguish between reality-based and nonreality-based thinking  2/25/2021 0102 by Blas Hernandez RN  Outcome: Met This Shift     Problem: Sensory Perception - Impaired:  Goal: Able to interrupt nonreality-based thinking  Description: Able to interrupt nonreality-based thinking  2/25/2021 0102 by Blas Hernandez RN  Outcome: Met This Shift     Problem: Confusion - Acute:  Goal: Mental status will be restored to baseline  Description: Mental status will be restored to baseline  2/25/2021 0102 by Blas Hernandez RN  Outcome: Ongoing  Note: Patient is A&Ox4 with no signs of confusion. Patient follows commands and expresses needs. Problem: Discharge Planning:  Goal: Ability to perform activities of daily living will improve  Description: Ability to perform activities of daily living will improve  2/25/2021 0102 by Blas Hernandez RN  Outcome: Ongoing  Note: Patient working with therapies. Currently ambulates using 3 person assist. Patient is able to transfer himself into a wheelchair and ambulate at home.       Problem: Discharge Planning:  Goal: Participates in care planning  Description: Participates in care planning  2/25/2021 0102 by Ibis Gaytan RN  Outcome: Ongoing     Problem: Injury - Risk of, Physical Injury:  Goal: Absence of physical injury  Description: Absence of physical injury  2/25/2021 0102 by Ibis Gaytan RN  Outcome: Ongoing     Problem: Injury - Risk of, Physical Injury:  Goal: Will remain free from falls  Description: Will remain free from falls  2/25/2021 0102 by Ibis Gaytan RN  Outcome: Ongoing  Note: Patient remained free from falls this shift. Bed is in low position with alarm on and siderails up x2. Education given on use of call light and patient voiced understanding. Call light and beside table within reach. Arm band and falling star in place. Hourly rounds completed. Will continue to monitor. Problem: Mood - Altered:  Goal: Mood stable  Description: Mood stable  2/25/2021 0102 by Ibis Gaytan RN  Outcome: Ongoing     Problem: Sleep Pattern Disturbance:  Goal: Appears well-rested  Description: Appears well-rested  2/25/2021 0102 by Ibis Gaytan RN  Outcome: Ongoing  Note: Patient able to sleep throughout night. Problem: Skin Integrity:  Goal: Will show no infection signs and symptoms  Description: Will show no infection signs and symptoms  2/25/2021 0102 by Ibis Gaytan RN  Outcome: Ongoing     Problem: Skin Integrity:  Goal: Absence of new skin breakdown  Description: Absence of new skin breakdown  2/25/2021 0102 by Ibis Gaytan RN  Outcome: Ongoing  Note: No signs of new skin breakdown noted with each assessment this shift. Skin warm, dry, and intact except where otherwise noted in head-to-toe assessment. Mucous membranes pink and moist. Assistance with turns/ambulation given as needed.         Problem: Falls - Risk of:  Goal: Absence of physical injury  Description: Absence of physical injury  2/25/2021 0102 by Ibis Gaytan RN  Outcome: Ongoing     Problem: Falls - Risk of:  Goal: Will remain free from falls  Description: Will remain free from falls  2/25/2021 0102 by Gilbert Miller RN  Outcome: Ongoing  Note: Patient remained free from falls this shift. Bed is in low position with alarm on and siderails up x2. Education given on use of call light and patient voiced understanding. Call light and beside table within reach. Arm band and falling star in place. Hourly rounds completed. Will continue to monitor. Problem: DISCHARGE BARRIERS  Goal: Patient's continuum of care needs are met  2/25/2021 0102 by Gilbert Miller RN  Outcome: Ongoing  Note: Patient plans to possibly be discharged to [facility]. Plan discussed with patient. Problem: Physical Regulation:  Goal: Will remain free from infection  Description: Will remain free from infection  2/25/2021 0102 by Gilbert Miller RN  Outcome: Ongoing      Care plan reviewed with patient. Patient verbalizes understanding of the plan of care and contribute to goal setting.

## 2021-02-25 NOTE — PROGRESS NOTES
Encounter Date: 2/25/2021    Portions of this notes is copied from previous physician encounters, reviewed and edited appropriately. Bill Meier is a 60 y.o. male admitted to [address] [place] [address] on 2/23/2021. Patient has history of depression, gout, dyslipidemia and hypertension. He also has diabetes and related neuropathy. Last known normal was 2 days back. Patient was brought in today with altered mental status and unable to follow commands. He is arousable but lethargic. He has some tremulousness of his bilateral extremities and jaw.     Patient has movements in bilateral extremities but the right upper and lower extremity appears to be less active compared to the left. Patient is a poor historian given his altered mental status. Unable to reach family. Lumbar puncture was positive for 8 nucleated cells predominantly neutrophils. Bacterial and viral profile on CSF negative. CSF culture pending       OVERNIGHT: Patient is stable. Complains of mild headache generalized. Also has some mild photophobia. Denies any focal deficits. Review of systems   No neck stiffness  No photophobia  All systems reviewed and are negative.          Current Facility-Administered Medications   Medication Dose Route Frequency Provider Last Rate Last Admin    levETIRAcetam (KEPPRA) tablet 500 mg  500 mg Oral BID Miki Maddox MD   500 mg at 02/25/21 0802    insulin lispro (HUMALOG) injection vial 0-18 Units  0-18 Units Subcutaneous TID WC Jack Lucia, APRN - CNP   6 Units at 02/25/21 0757    insulin lispro (HUMALOG) injection vial 0-9 Units  0-9 Units Subcutaneous Nightly Jack Lucia, APRN - CNP   3 Units at 02/24/21 2026    glucose (GLUTOSE) 40 % oral gel 15 g  15 g Oral PRN Jack Lucia, APRN - CNP        dextrose 50 % IV solution  12.5 g Intravenous PRN Jack Lucia, APRN - CNP        glucagon (rDNA) injection 1 mg  1 mg Intramuscular PRN Jack Lucia APRN - CNP        dextrose 5 % solution  100 mL/hr Intravenous PRN Nya Nazario APRN - NITA        hydrALAZINE (APRESOLINE) tablet 25 mg  25 mg Oral 3 times per day REI Palmer - CNP   25 mg at 02/25/21 0625    amLODIPine (NORVASC) tablet 10 mg  10 mg Oral Daily Emanuel Naga Walls, APRN - CNP   10 mg at 02/25/21 0802    aspirin EC tablet 81 mg  81 mg Oral Daily Emanuel Naga Walls, APRN - CNP   81 mg at 02/24/21 1004    atorvastatin (LIPITOR) tablet 80 mg  80 mg Oral Nightly Emanuel Walls, APRN - CNP   80 mg at 02/24/21 2026    ferrous sulfate (IRON 325) tablet 325 mg  325 mg Oral BID WC Emanuel Walls, APRN - CNP   325 mg at 02/25/21 0802    pantoprazole (PROTONIX) tablet 40 mg  40 mg Oral QAM AC Augustine Walls APRN - CNP   40 mg at 02/25/21 0625    lisinopril (PRINIVIL;ZESTRIL) tablet 20 mg  20 mg Oral Daily Emanuel Naga Walls, APRN - CNP   20 mg at 02/25/21 0802    metoprolol tartrate (LOPRESSOR) tablet 25 mg  25 mg Oral BID Emanuel Naga Walls, APRN - CNP   25 mg at 02/24/21 2025    sertraline (ZOLOFT) tablet 50 mg  50 mg Oral Daily Emanuel Walls, APRN - CNP   50 mg at 02/25/21 0802    sodium chloride flush 0.9 % injection 10 mL  10 mL Intravenous 2 times per day REI Sanchez - CNP   10 mL at 02/24/21 1004    sodium chloride flush 0.9 % injection 10 mL  10 mL Intravenous PRN Emanuel Walls, APRN - CNP        enoxaparin (LOVENOX) injection 60 mg  60 mg Subcutaneous Q12H Augustine Walls, APRN - CNP   60 mg at 02/24/21 2026    promethazine (PHENERGAN) tablet 12.5 mg  12.5 mg Oral Q6H PRN Emanuel Walls, APRN - CNP        Or    ondansetron (ZOFRAN) injection 4 mg  4 mg Intravenous Q6H PRN REI Spencer - NITA        polyethylene glycol (GLYCOLAX) packet 17 g  17 g Oral Daily PRN Emanuel Alert REI Walls - NITA        acetaminophen (TYLENOL) tablet 650 mg  650 mg Oral Q6H PRN Emanuel Alert REI Walls - CNP   650 mg at 02/25/21 1498    Or    acetaminophen (TYLENOL) suppository 650 mg  650 mg Rectal Q6H PRN Madeleine Daubs Paphanchith, APRN - CNP        0.9 % sodium chloride infusion   Intravenous Continuous Madeleine Daubs Paphanchith, APRN - CNP 50 mL/hr at 02/25/21 0805 New Bag at 02/25/21 0805     Allergies   Allergen Reactions    Aripiprazole Other (See Comments)     Pain in lower back    Aripiprazole      Other reaction(s): Abilify, unable to straighten up when taking, unable to straighten up when taking    Citalopram Hydrobromide Other (See Comments)     Muscle aches and pain       PHYSICAL EXAMINATION:    Vitals:   Patient Vitals for the past 4 hrs:   BP Temp Temp src Pulse Resp SpO2   02/25/21 0745 (!) 182/77 101.4 °F (38.6 °C) Rectal 81 22 94 %   02/25/21 0625 138/85 -- -- -- -- --           General:  pleasant in no acute distress. HEENT: No pallor or icterus. Neck supple. No Carotid bruits. Heart: S1 and S2 normal with regular rhythm. No murmur. GENERAL NEUROLOGIC EXAM     Mental Status:   Awake alert and oriented to person place and time. Language is normal for comprehension and fluency. Attention span and concentration were normal.       Cranial nerves:      Pupils are equal regular and reactive to light. Muscles of facial expression were strong and symmetric. Jaw tremulousness much better compared to yesterday. Motor: Normal tone and bulk with no involuntary movements or pronator drift. Strength normal 5/5 in bilateral upper and lower extremities. Sensation: Light touch, pin prick, vibration and joint position sense were normal in the upper and lower extremities. Reflexes: 2+ bilaterally symmetrical with down going toes. Coordination: finger nose and heel shin test normal bilaterally.      Gait:  Not tested because of risk of fall         Labs:     CBC:   Recent Labs     02/23/21  1351 02/25/21  0421   WBC 11.9* 10.6   HGB 17.0 15.3    152   MCV 95.2* 94.0   MCH 30.1 29.8   MCHC 31.6* 31.7*     CMP:  Recent Labs     02/23/21  1351 02/25/21  0421   * 137   K 4.2 4.0    102   CO2 19* 23   BUN 14 21   CREATININE 1.0 1.4*   LABGLOM 75* 51*   GLUCOSE 233* 228*   CALCIUM 9.0 8.8     Liver:   Recent Labs     02/23/21  1351   AST 22   ALT 12   ALKPHOS 82   PROT 7.1   LABALBU 3.2*   BILITOT 0.5     Stroke Labs:   Recent Labs     02/23/21  1351 02/24/21  0345   TSH 2.110  --    LDLCALC  --  89       Imaging:  No results found for this or any previous visit. No results found for this or any previous visit. No results found for this or any previous visit. No results found for this or any previous visit. No results found for this or any previous visit. No results found for this or any previous visit. No results found for this or any previous visit. Results for orders placed during the hospital encounter of 02/23/21   CT HEAD WO CONTRAST    Narrative PROCEDURE: CT HEAD WO CONTRAST    CLINICAL INFORMATION: ams. COMPARISON: Head CT 3/7/2018. TECHNIQUE: Noncontrast 5 mm axial images were obtained through the brain. Sagittal and coronal reconstructions were obtained. All CT scans at this facility use dose modulation, iterative reconstruction, and/or weight-based dosing when appropriate to reduce radiation dose to as low as reasonably achievable. FINDINGS:    No hydrocephalus, midline shift, extra-axial fluid collection or intracranial hemorrhage. Sequelae of mild chronic microvascular ischemic disease. Incidental note of intracranial vascular calcification. Mastoids and middle ears are clear. Likely cerumen within the right external auditory canal. Mucous retention cyst of the right maxillary sinus. Status post left lens replacement. The orbits are intact. Calvarium and skull base are intact. 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 Bilateral degenerative changes of the temporomandibular joints.         Impression  No acute intracranial process. **This report has been created using voice recognition software. It may contain minor errors which are inherent in voice recognition technology. **    Final report electronically signed by Dr. Reza Chau on 2/23/2021 2:54 PM       Active Problems:    AMS (altered mental status)    Encephalopathy  Resolved Problems:    * No resolved hospital problems. *       ASSESSMENT/PLAN:   This is a 60 y.o. male who  has a past medical history of Depression, Gout, Hyperlipidemia, Hypertension, Neuropathy, Pneumonia, and Type II or unspecified type diabetes mellitus without mention of complication, not stated as uncontrolled. He presents with altered mental status. On exam patient is lethargic and has some jaw tremulousness. He also has some gaze preference to the left and mild reduced activity in the right upper and lower extremity. He has some rash on the lower extremity. Patient is much better today.     1. Acute encephalopathy for evaluation: ?  Meningoencephalitis:   ID is following the patient. Brain MRI still pending    2. Possible seizures:  -EEG abnormal for some delta activity: Continue Keppra 500 twice daily    Extremely guarded prognosis. Thank you for allowing us to participate in the care of this patient.     Electronically signed by Samuel Taylor MD on 2/25/2021 at 8:11 AM

## 2021-02-25 NOTE — PROGRESS NOTES
Physician Progress Note      PATIENT:               Ryan Burrell  CSN #:                  [id number]  :                       1955  ADMIT DATE:       2021 1:06 PM  [address] [place] DATE:  RESPONDING  PROVIDER #:        Trevor Doty CNP          QUERY TEXT:    Pt admitted with AMS and has encephalopathy documented. If possible, please   document below in progress notes and discharge summary further specificity   regarding the type of encephalopathy:    The medical record reflects the following:  Risk Factors: Leukocytosis. Type 2 diabetes. HTN, Chronic diastolic failure. Clinical Indicators: Acute encephalopathy for evaluation: Rule out CVA   seizures and meningitis. Sherrell Pascual 2-23 H/P: Acute encephalopathy. Etiology unclear. CVA vs Seizure vs Meningitis. Admit to 4A. Hold Home Gabapentin and Vistaril. NPO until able to pass a bedside swallow evaluation by nursing. Treatment: Neuro constult, -CT of the head non acute. LP at beside in ED   unsuccessful and will be obtained by IR. Follow labs. Continue empiric ATB   started in the ED. MRI and EEG to be completed. Neuro checks every 4 hours. PT/OT/SLP to see. Seen by neurology. Check UDS---Positive for cannabis    Thank you. Please call if you have any questions. P) [phone]. Signed   by Aly Aguilera RN Clinical , CRCR  Options provided:  -- Metabolic encephalopathy  -- Hypertensive encephalopathy  -- Septic encephalopathy  -- Toxic encephalopathy  -- Toxic metabolic encephalopathy  -- Other - I will add my own diagnosis  -- Disagree - Not applicable / Not valid  -- Disagree - Clinically unable to determine / Unknown  -- Refer to Clinical Documentation Reviewer    PROVIDER RESPONSE TEXT:    This patient has metabolic encephalopathy.     Query created by: Prince Oliver on 2021 12:22 PM      Electronically signed by:  Trevor Doty CNP 2021 9:24 PM

## 2021-02-25 NOTE — PLAN OF CARE
Sushil Denson 61  OCCUPATIONAL THERAPY MISSED TREATMENT NOTE  [facility] 4A  4A-04/004-A      Date: 2021  Patient Name: Angelia Loaiza        CSN: [id number]   : 1955  (60 y.o.)  Gender: male                REASON FOR MISSED TREATMENT: Hollis Hutton RN requesting to hold OT eval this date. RN reporting pt had a temp and was hypertensive. Pt awaiting for MRI to be completed as well.

## 2021-02-25 NOTE — FLOWSHEET NOTE
Pt was alone and was confused but was blessed. 02/25/21 1511   Encounter Summary   Services provided to: Patient   Referral/Consult From: Rounding   Continue Visiting Yes  (2/25 )   Complexity of Encounter Low   Length of Encounter 15 minutes   Routine   Type Initial   Assessment Approachable;Calm   Intervention [place];Prayer; Active listening   Outcome Acceptance;Expressed gratitude

## 2021-02-25 NOTE — PROGRESS NOTES
Hospitalist Progress Note      Patient:  Clarence Coles    Unit/Bed:4A-04/004-A  YOB: 1955  MRN: [id number]   Acct: [de-identified]   PCP: REI Hicks - NP  Date of Admission: 2/23/2021    Assessment/Plan:    1. Acute metabolic encephalopathy: Resolving. Etiology unclear. CVA vs Seizure, Meningitis less likely. CT of the head non-acute. LP demonstrated CSF positive for 8 total nucleated cells, predominantly neutrophils. ID evaluated, low index of suspicion for meningitis at this point, antibiotics were discontinued, appreciate assistance. MRI and EEG to be completed. Neuro checks every 4 hours. PT/OT/SLP following. Neurology evaluated. UDS positive for cannabinoids. Hold Home Gabapentin and Vistaril. Plan for MRI this morning. 2. Leukocytosis: Procal negative. UA negative for infection. CXR negative on 2/23, repeat CXR ordered to reevaluate pulmonary fields for potential aspiration pneumonia given persistent fevers and high-normal white count. 3. Type 2 diabetes mellitus: Hold home orals and basal bolus regimen. Hemoglobin A1c 6.1 on 4/17/2020, repeat pending. Continue Accu-Cheks before meals and at bedtime with sliding scale insulin coverage (medium dose corrective algorithm). Hypoglycemia protocol in place, maintain carb controlled diet. 4. SHELLEY: Most likely prerenal, baseline creatinine appears to be close to 1.1, currently 1.4. Continue gentle IV fluid hydration and reassess chemistry in the morning. 5. Primary hypertension: History, stable. Currently hypertensive. Continue amlodipine, lisinopril, Lopressor, and hydralazine. 6. Head lice: Nix shampoo utilized on 2/24, improvement noted. 7. Chronic diastolic heart failure: No overt signs of decompensation. Hold Lasix. Continue daily weights and maintain strict intake and output. Continue BB and ACE. 8. Hyperlipidemia: Continue high intensity statin.   Fasting lipid panel stable, triglycerides high-normal.   9. GERD: Continue PPI. 10. Depression and anxiety: Continue Zoloft. 11. Morbid obesity: BMI 52.95    Chief Complaint: AMS    Initial H and P:-    **Per Chart Review:  \"59 y.o. male who presented to [address] [place] [address] with AMS. Brother called EMS today. Please note this HPI is difficult to obtain. Patient is unable to provide information, no family at bedside. Information was gathered via chart review and report. Last known well is conflicting ED resident notes Ismael Bowden at 0200 today, Neurology reports LKW was 2 days ago. On presentation to the ED he is lethargic but arouses. Does not follow commands. Tremor noted in the extremities, tongue and jaw. He withdrawals to pain. Does not answer interview questions and unable to obtain ROS. Gaze preference to the left. Right side appears more weak than left. In the ED CT was obtained and non acute. Seen by neurology. LP was attempted in ED and not successful. He was taken to IR for LP. He has been given Acyclovir, Rocephin and Vancomycin. \"    Subjective (past 24 hours):   Patient resting in bed at the time of the interview. Currently denies any physical complaints and denied any issues overnight and into the morning. He was updated on the current plan of care, verbalizes understanding, and had no other needs or questions at this time. Past medical history, family history, social history and allergies reviewed again and is unchanged since admission. ROS (14 point review of systems completed. Pertinent positives noted. Otherwise ROS is negative) :  GENERAL: No fever,chills, or night sweats. SKIN: No lesions or rashes. HEAD: No headaches or recent injury. EYES: No acute changes in vision, no diplopia or blurred vision. EARS: No hearing loss, no tinnitus. NOSE/THROAT: No rhinorrhea or pharyngitis, no nasal drainage. NECK: No lumps or unusual neck stiffness.   PULMONARY: Respirations easy and non-labored, no acute distress. CARDIAC: No chest pain, pressure, Negative for lower leg edema. GI: Abdomen is soft and non-tender, non-distended. PERIPHERAL VASCULAR: No intermittent claudication or unusual leg cramps. MUSCULOSKELETAL: Occasional arthralgias, myalgias. NEUROLOGICAL: Denies any headache, near syncope, seizures or syncope. HEMATOLOGIC:  No unusual bruising or bleeding. PSYCH: Denies any homicidal or suicidial ideations. Medications:  Reviewed    Infusion Medications    dextrose      sodium chloride 50 mL/hr at 02/25/21 0805     Scheduled Medications    miconazole   Topical BID    levETIRAcetam  500 mg Oral BID    insulin lispro  0-18 Units Subcutaneous TID WC    insulin lispro  0-9 Units Subcutaneous Nightly    hydrALAZINE  25 mg Oral 3 times per day    amLODIPine  10 mg Oral Daily    aspirin EC  81 mg Oral Daily    atorvastatin  80 mg Oral Nightly    ferrous sulfate  325 mg Oral BID WC    pantoprazole  40 mg Oral QAM AC    lisinopril  20 mg Oral Daily    metoprolol tartrate  25 mg Oral BID    sertraline  50 mg Oral Daily    sodium chloride flush  10 mL Intravenous 2 times per day    enoxaparin  60 mg Subcutaneous Q12H     PRN Meds: glucose, dextrose, glucagon (rDNA), dextrose, sodium chloride flush, promethazine **OR** ondansetron, polyethylene glycol, acetaminophen **OR** acetaminophen      Intake/Output Summary (Last 24 hours) at 2/25/2021 0945  Last data filed at 2/24/2021 2310  Gross per 24 hour   Intake 2315.26 ml   Output --   Net 2315.26 ml       Diet:  DIET CARB CONTROL; Exam:  BP (!) 189/84   Pulse 72   Temp 99.9 °F (37.7 °C) (Axillary)   Resp 22   Ht 5' 10\" (1.778 m)   Wt (!) 369 lb (167.4 kg)   SpO2 94%   BMI 52.95 kg/m²     General appearance: Alert and appropriate, pleasant adult male. No apparent distress, appears stated age and cooperative. HEENT: Pupils equal, round, and reactive to light. Conjunctivae/corneas clear. Neck: Supple, with full range of motion.  No XR CHEST PORTABLE   Final Result   Very low lung volumes. No acute cardiopulmonary process otherwise            **This report has been created using voice recognition software. It may contain minor errors which are inherent in voice recognition technology. **      Final report electronically signed by Dr. Carlee Elder on 2/23/2021 1:45 PM      MRI BRAIN W CONTRAST    (Results Pending)     Ct Head Wo Contrast    Result Date: 2/23/2021  PROCEDURE: CT HEAD WO CONTRAST CLINICAL INFORMATION: ams. COMPARISON: Head CT 3/7/2018. TECHNIQUE: Noncontrast 5 mm axial images were obtained through the brain. Sagittal and coronal reconstructions were obtained. All CT scans at this facility use dose modulation, iterative reconstruction, and/or weight-based dosing when appropriate to reduce radiation dose to as low as reasonably achievable. FINDINGS: No hydrocephalus, midline shift, extra-axial fluid collection or intracranial hemorrhage. Sequelae of mild chronic microvascular ischemic disease. Incidental note of intracranial vascular calcification. Mastoids and middle ears are clear. Likely cerumen within the right external auditory canal. Mucous retention cyst of the right maxillary sinus. Status post left lens replacement. The orbits are intact. Calvarium and skull base are intact. 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 Bilateral degenerative changes of the temporomandibular joints. No acute intracranial process. **This report has been created using voice recognition software. It may contain minor errors which are inherent in voice recognition technology. ** Final report electronically signed by Dr. Gilberto Heath on 2/23/2021 2:54 PM    Xr Chest Portable    Result Date: 2/23/2021  PROCEDURE: XR CHEST PORTABLE CLINICAL INFORMATION: shortness of breath . COMPARISON: 4/20/2020 TECHNIQUE: Portable semiupright FINDINGS: Very low lung volumes. Heart size is normal. Mediastinum is not widened.  No airspace consolidations or pleural effusions identified. Pulmonary vessels are not congested. EKG leads overlie the chest.     Very low lung volumes. No acute cardiopulmonary process otherwise **This report has been created using voice recognition software. It may contain minor errors which are inherent in voice recognition technology. ** Final report electronically signed by Dr. Leny Parnell on 2/23/2021 1:45 PM    Ir Lumbar Puncture For Diagnosis    Result Date: 2/23/2021  LUMBAR PUNCTURE WITH FLUOROSCOPIC GUIDANCE: CLINICAL INFORMATION:  altered mental status PERFORMED BY: Scotty Grayson M.D. APPROACH: Left L4-L5 translaminar NEEDLE: 22-gauge spinal needle FLUID: 7.5 mL clear colorless CSF FLUOROSCOPY TIME: 3.2 FLUOROSCOPIC IMAGES: 9 DOSE (MGY):549 PROCEDURE:  Signed informed consent was obtained prior to performing this procedure. Following local anesthesia and utilizing aseptic technique, a spinal needle was successfully inserted into the lumbar thecal sac at the level listed above. The amount of spinal fluid listed above was obtained in four separate vials, and sent to the laboratory for diagnostic testing. Status post successful lumbar puncture. Of note, only 7.5 mL of fluid was obtained due to patient's inability to remain still on the table due to mental status change. Opening pressures could also not be obtained for the same reason. **This report has been created using voice recognition software. It may contain minor errors which are inherent in voice recognition technology. ** Final report electronically signed by Dr. Isha العلي on 2/23/2021 6:12 PM      **This report has been created using voice recognition software. It may contain minor errors which are inherent in voice recognition technology. **  Electronically signed by REI Quintero CNP on 2/25/2021 at 9:45 AM

## 2021-02-25 NOTE — PROGRESS NOTES
upright in bed upon ST arrival. Pt alert and pleasant. No family members present at bedside. SOCIAL HISTORY:   Living Arrangements: Lives in [facility] with his two adult brothers and a nephew  Work History: disability  Education Level: HS diploma  Driving Status:Pt reported used to drive, currently does not drive  Finance Management: Independent  Medication Management: Independent  ADL's: Independent. Hobbies: Playing online games ([employer], [employer])  Vision Status: WFL  Hearing: WFL       ORAL MOTOR:  Facial / Labial WFL    Lingual Impaired Lingual fasiculations   Dentition Impaired Edentulous   Velum Not Tested    Vocal Quality WFL    Sensation Not Tested    Cough Not Tested      SPEECH / VOICE:  Speech: WFL  Voice: WFL    Receptive and Expressive Language:  Receptive and Expressive language skills appear to be grossly intact for basic and complex daily communication. COGNITION:  Wes Cognitive Assessment (MOCA) version 7.2 completed. Pt scored 20/30. Normal is greater than or equal to 26/30.   *Inclusion of +1 point given highest level of education achieved less than/equal to 12th grade or GED with limited-0 post-secondary schooling     Orientation:   Immediate Recall: Trial 1: 1/5, Trial 2: 2/5  Short-Term Recall: 2/5 min cues, 2/5 max cues, 1/5 unable to recall  Divergent Naming: Named 5 items within 1 minute time frame (target=11)  Reasonin/2 verbal, 1/2 visual  Sequencing: impaired  Thought Organization: adequate  Insight: fair  Attention: pt distractable, yet functional at basic level, impairments increase at higher level (divided, alternating)  Math Computation: 2/3  Executive Functionin/3 clock drawing    SWALLOWING:  Current Diet: Regular diet with thin liquids  Not Tested d/t no concerns re: swallowing         RECOMMENDATIONS/ASSESSMENT:  DIAGNOSTIC IMPRESSIONS:  Pt presents with Mild cognitive impairment specifically within the domains of immediate/delay recall, working memory, Fiordaliza Martínez MA., CCC-SLP

## 2021-02-25 NOTE — PROGRESS NOTES
Progress note: Infectious diseases    Patient - Dianna Brito,  Age - 60 y.o.    - 1955      Room Number - 4A-04/004-A   MRN -  [id number]   Acct # - [de-identified]  Date of Admission -  2021  1:06 PM    SUBJECTIVE:   He has no new complaints  Has fever  OBJECTIVE   VITALS    height is 5' 10\" (1.778 m) and weight is 369 lb (167.4 kg) (abnormal). His rectal temperature is 101.4 °F (38.6 °C). His blood pressure is 182/77 (abnormal) and his pulse is 81. His respiration is 22 and oxygen saturation is 94%.        Wt Readings from Last 3 Encounters:   21 (!) 369 lb (167.4 kg)   20 (!) 339 lb (153.8 kg)   20 (!) 356 lb 0.7 oz (161.5 kg)       I/O (24 Hours)    Intake/Output Summary (Last 24 hours) at 2021 0855  Last data filed at 2021 2310  Gross per 24 hour   Intake 2555.26 ml   Output --   Net 2555.26 ml       General Appearance  Awake, alert, oriented,  not  In acute distress  HEENT - normocephalic, atraumatic, pink conjunctiva,  anicteric sclera  Neck - Supple, no mass  Lungs -  Bilateral   air entry, no rhonchi, no wheeze  Cardiovascular - Heart sounds are normal.     Abdomen - soft, not distended, nontender,   Neurologic -oriented, +tremors  Skin - No bruising or bleeding  Extremities - No edema, no cyanosis, clubbing     MEDICATIONS:      miconazole   Topical BID    levETIRAcetam  500 mg Oral BID    insulin lispro  0-18 Units Subcutaneous TID WC    insulin lispro  0-9 Units Subcutaneous Nightly    hydrALAZINE  25 mg Oral 3 times per day    amLODIPine  10 mg Oral Daily    aspirin EC  81 mg Oral Daily    atorvastatin  80 mg Oral Nightly    ferrous sulfate  325 mg Oral BID WC    pantoprazole  40 mg Oral QAM AC    lisinopril  20 mg Oral Daily    metoprolol tartrate  25 mg Oral BID    sertraline  50 mg Oral Daily    sodium chloride flush  10 mL Intravenous 2 times per day    enoxaparin  60 mg Subcutaneous Q12H      dextrose      sodium chloride 50 mL/hr at 02/25/21 0805     glucose, dextrose, glucagon (rDNA), dextrose, sodium chloride flush, promethazine **OR** ondansetron, polyethylene glycol, acetaminophen **OR** acetaminophen      LABS:     CBC:   Recent Labs     02/23/21  1351 02/25/21  0421   WBC 11.9* 10.6   HGB 17.0 15.3    152     BMP:    Recent Labs     02/23/21  1351 02/25/21  0421   * 137   K 4.2 4.0    102   CO2 19* 23   BUN 14 21   CREATININE 1.0 1.4*   GLUCOSE 233* 228*     Calcium:  Recent Labs     02/25/21  0421   CALCIUM 8.8      Recent Labs     02/25/21  0421   PHOS 3.4     BNP:No results for input(s): BNP in the last 72 hours. Glucose:  Recent Labs     02/24/21  1531 02/24/21  2030 02/25/21  0629   POCGLU 248* 211* 220*     HgbA1C:   Recent Labs     02/25/21  0421   LABA1C 8.5*     INR: No results for input(s): INR in the last 72 hours. Hepatic:   Recent Labs     02/23/21  1351   ALKPHOS 82   ALT 12   AST 22   PROT 7.1   BILITOT 0.5   BILIDIR <0.2   LABALBU 3.2*      Recent Labs     02/23/21  1500   CKTOTAL 121     BNP: No results for input(s): BNP in the last 72 hours.     CULTURES:   UA:   Recent Labs     02/23/21  1627   PHUR 7.5   COLORU YELLOW   PROTEINU >= 300*   BLOODU MODERATE*   RBCUA 3-5   WBCUA 10-15   BACTERIA NONE   NITRU NEGATIVE   GLUCOSEU 500*   BILIRUBINUR NEGATIVE   UROBILINOGEN 0.2   KETUA 40*     Micro:   Lab Results   Component Value Date    BC No growth-preliminary  02/23/2021        Problem list of patient:     Patient Active Problem List   Diagnosis Code    Gout M10.9    Diabetes mellitus type 1, uncontrolled ([facility] [address] E10.65    Hypertension I10    Arthritis M19.90    Cellulitis of toe, left L03.032    Pyogenic arthritis of foot (HCC) M00.9    Hyperlipidemia E78.5    GERD (gastroesophageal reflux disease) K21.9    Sepsis (HCC) A41.9    Community acquired pneumonia J18.9    Diabetic ketoacidosis without coma associated with type 2 diabetes mellitus ([facility] E11.10    Diabetic ketoacidosis without coma associated with type 1 diabetes mellitus ([facility] E10.10    Acute respiratory failure with hypoxemia ([facility]) J96.01    Symptomatic anemia D64.9    Microcytic anemia D50.9    Anasarca R60.1    Abnormal thyroid function test R94.6    Type 2 diabetes mellitus with other specified complication ([facility]) A83.72    Anxiety and depression F41.9, F32.9    Family history of tobacco use Z81.2    Morbid obesity ([facility]) E66.01    Excoriation of lower leg, sequela S80.819S    Iron deficiency anemia D50.9    Acute diastolic (congestive) heart failure ([facility]) I50.31    Nocturnal hypoxia G47.34    AMS (altered mental status) R41.82    Encephalopathy G93.40         ASSESSMENT/PLAN   Encephalopathy resolved: I doubt he has any cns infection. CSF negative. Concern for drug use. He was positive for cannabis  Fever: has low lung volume. Will encourage incentive spirometry. Headache:could be worse due to the LP  Discussed with neurology: will have MRI. Hx of CHF  Hypertension: running high.         Gina Andrews MD, [address] 2/25/2021 8:55 AM

## 2021-02-25 NOTE — PROGRESS NOTES
Sushil Denson 61  PHYSICAL THERAPY MISSED TREATMENT NOTE  [facility]    Date: 2021  Patient Name: Juan Antonio Sender        MRN: [id number]   : 1955  (60 y.o.)  Gender: male                REASON FOR MISSED TREATMENT:  Hold treatment per nursing request.  RN reports having high rectal temp, high BPs, and still needing brain MRI. Recommended to wait and check back tomorrow. Radha Posada.  [address] [facility], [facility] 8

## 2021-02-26 VITALS
TEMPERATURE: 98.2 F | OXYGEN SATURATION: 94 % | SYSTOLIC BLOOD PRESSURE: 169 MMHG | RESPIRATION RATE: 20 BRPM | BODY MASS INDEX: 45.1 KG/M2 | WEIGHT: 315 LBS | HEIGHT: 70 IN | HEART RATE: 67 BPM | DIASTOLIC BLOOD PRESSURE: 73 MMHG

## 2021-02-26 LAB
ANION GAP SERPL CALCULATED.3IONS-SCNC: 9 MEQ/L (ref 8–16)
BUN BLDV-MCNC: 19 MG/DL (ref 7–22)
CALCIUM SERPL-MCNC: 8.4 MG/DL (ref 8.5–10.5)
CHLORIDE BLD-SCNC: 102 MEQ/L (ref 98–111)
CO2: 22 MEQ/L (ref 23–33)
CREAT SERPL-MCNC: 1.1 MG/DL (ref 0.4–1.2)
CRYPTOCOCCAL ANTIGEN CSF: NEGATIVE
ERYTHROCYTE [DISTWIDTH] IN BLOOD BY AUTOMATED COUNT: 13.1 % (ref 11.5–14.5)
ERYTHROCYTE [DISTWIDTH] IN BLOOD BY AUTOMATED COUNT: 45.1 FL (ref 35–45)
GFR SERPL CREATININE-BSD FRML MDRD: 67 ML/MIN/1.73M2
GLUCOSE BLD-MCNC: 236 MG/DL (ref 70–108)
GLUCOSE BLD-MCNC: 258 MG/DL (ref 70–108)
GLUCOSE BLD-MCNC: 275 MG/DL (ref 70–108)
GLUCOSE BLD-MCNC: 278 MG/DL (ref 70–108)
HCT VFR BLD CALC: 44.3 % (ref 42–52)
HEMOGLOBIN: 14 GM/DL (ref 14–18)
MAGNESIUM: 1.7 MG/DL (ref 1.6–2.4)
MCH RBC QN AUTO: 29.5 PG (ref 26–33)
MCHC RBC AUTO-ENTMCNC: 31.6 GM/DL (ref 32.2–35.5)
MCV RBC AUTO: 93.5 FL (ref 80–94)
PHOSPHORUS: 3.4 MG/DL (ref 2.4–4.7)
PLATELET # BLD: 140 THOU/MM3 (ref 130–400)
PMV BLD AUTO: 11.6 FL (ref 9.4–12.4)
POTASSIUM REFLEX MAGNESIUM: 3.4 MEQ/L (ref 3.5–5.2)
RBC # BLD: 4.74 MILL/MM3 (ref 4.7–6.1)
SODIUM BLD-SCNC: 133 MEQ/L (ref 135–145)
WBC # BLD: 5.6 THOU/MM3 (ref 4.8–10.8)

## 2021-02-26 PROCEDURE — 2580000003 HC RX 258: Performed by: NURSE PRACTITIONER

## 2021-02-26 PROCEDURE — 99232 SBSQ HOSP IP/OBS MODERATE 35: CPT | Performed by: PSYCHIATRY & NEUROLOGY

## 2021-02-26 PROCEDURE — 82948 REAGENT STRIP/BLOOD GLUCOSE: CPT

## 2021-02-26 PROCEDURE — 99239 HOSP IP/OBS DSCHRG MGMT >30: CPT | Performed by: NURSE PRACTITIONER

## 2021-02-26 PROCEDURE — 36415 COLL VENOUS BLD VENIPUNCTURE: CPT

## 2021-02-26 PROCEDURE — 80048 BASIC METABOLIC PNL TOTAL CA: CPT

## 2021-02-26 PROCEDURE — 97166 OT EVAL MOD COMPLEX 45 MIN: CPT

## 2021-02-26 PROCEDURE — 84100 ASSAY OF PHOSPHORUS: CPT

## 2021-02-26 PROCEDURE — 85027 COMPLETE CBC AUTOMATED: CPT

## 2021-02-26 PROCEDURE — 83735 ASSAY OF MAGNESIUM: CPT

## 2021-02-26 PROCEDURE — 6370000000 HC RX 637 (ALT 250 FOR IP): Performed by: NURSE PRACTITIONER

## 2021-02-26 PROCEDURE — 97110 THERAPEUTIC EXERCISES: CPT

## 2021-02-26 PROCEDURE — 97162 PT EVAL MOD COMPLEX 30 MIN: CPT

## 2021-02-26 PROCEDURE — 6370000000 HC RX 637 (ALT 250 FOR IP): Performed by: PSYCHIATRY & NEUROLOGY

## 2021-02-26 PROCEDURE — 6360000002 HC RX W HCPCS: Performed by: NURSE PRACTITIONER

## 2021-02-26 RX ORDER — POTASSIUM CHLORIDE 7.45 MG/ML
10 INJECTION INTRAVENOUS PRN
Status: DISCONTINUED | OUTPATIENT
Start: 2021-02-26 | End: 2021-02-26 | Stop reason: HOSPADM

## 2021-02-26 RX ORDER — POTASSIUM CHLORIDE 20 MEQ/1
40 TABLET, EXTENDED RELEASE ORAL PRN
Status: DISCONTINUED | OUTPATIENT
Start: 2021-02-26 | End: 2021-02-26 | Stop reason: HOSPADM

## 2021-02-26 RX ORDER — LEVETIRACETAM 500 MG/1
500 TABLET ORAL 2 TIMES DAILY
Qty: 60 TABLET | Refills: 0 | Status: SHIPPED | OUTPATIENT
Start: 2021-02-26

## 2021-02-26 RX ORDER — HYDRALAZINE HYDROCHLORIDE 25 MG/1
25 TABLET, FILM COATED ORAL EVERY 8 HOURS SCHEDULED
Qty: 90 TABLET | Refills: 0 | Status: SHIPPED | OUTPATIENT
Start: 2021-02-26

## 2021-02-26 RX ADMIN — ENOXAPARIN SODIUM 60 MG: 60 INJECTION SUBCUTANEOUS at 09:28

## 2021-02-26 RX ADMIN — LEVETIRACETAM 500 MG: 500 TABLET, FILM COATED ORAL at 09:28

## 2021-02-26 RX ADMIN — ASPIRIN 81 MG: 81 TABLET, COATED ORAL at 09:28

## 2021-02-26 RX ADMIN — METOPROLOL TARTRATE 25 MG: 50 TABLET, FILM COATED ORAL at 09:28

## 2021-02-26 RX ADMIN — PANTOPRAZOLE SODIUM 40 MG: 40 TABLET, DELAYED RELEASE ORAL at 06:49

## 2021-02-26 RX ADMIN — HYDRALAZINE HYDROCHLORIDE 25 MG: 25 TABLET, FILM COATED ORAL at 06:49

## 2021-02-26 RX ADMIN — POTASSIUM CHLORIDE 40 MEQ: 1500 TABLET, EXTENDED RELEASE ORAL at 16:43

## 2021-02-26 RX ADMIN — FERROUS SULFATE TAB 325 MG (65 MG ELEMENTAL FE) 325 MG: 325 (65 FE) TAB at 16:43

## 2021-02-26 RX ADMIN — SODIUM CHLORIDE: 9 INJECTION, SOLUTION INTRAVENOUS at 11:38

## 2021-02-26 RX ADMIN — LISINOPRIL 20 MG: 20 TABLET ORAL at 09:28

## 2021-02-26 RX ADMIN — HYDRALAZINE HYDROCHLORIDE 25 MG: 25 TABLET, FILM COATED ORAL at 14:43

## 2021-02-26 RX ADMIN — SERTRALINE 50 MG: 50 TABLET, FILM COATED ORAL at 09:28

## 2021-02-26 RX ADMIN — ACETAMINOPHEN 650 MG: 325 TABLET ORAL at 11:35

## 2021-02-26 RX ADMIN — AMLODIPINE BESYLATE 10 MG: 10 TABLET ORAL at 09:28

## 2021-02-26 RX ADMIN — FERROUS SULFATE TAB 325 MG (65 MG ELEMENTAL FE) 325 MG: 325 (65 FE) TAB at 09:28

## 2021-02-26 ASSESSMENT — PAIN DESCRIPTION - LOCATION: LOCATION: HEAD

## 2021-02-26 ASSESSMENT — PAIN SCALES - GENERAL: PAINLEVEL_OUTOF10: 5

## 2021-02-26 NOTE — DISCHARGE SUMMARY
Hospitalist Discharge Summary        Patient: Marie Alexander  YOB: 1955  MRN: [id number]   Acct: [de-identified]    Primary Care Physician: REI Vega NP    Admit date  2/23/2021    Discharge date:  2/26/2021 2:49 PM    Chief Complaint on presentation : Altered mental status    Discharge Assessment and Plan:    1. Acute metabolic encephalopathy:  Resolved, back to baseline. Etiology unclear. CVA vs Seizure, Meningitis less likely. CT of the head non-acute. LP demonstrated CSF positive for 8 total nucleated cells, predominantly neutrophils.   ID evaluated, low index of suspicion for meningitis at this point, antibiotics were discontinued, appreciate assistance. MRI and EEG to be completed. PT/OT/SLP evaluated with recommendations for subacute/skilled nursing, patient adamantly declined. Neurology evaluated. UDS positive for cannabinoids. 2. Leukocytosis:  Resolved. Procal negative. UA negative for infection. CXR negative. 3. Type 2 diabetes mellitus: Hemoglobin A1c 6.1 on 4/17/2020, repeat 8.5. Continue home diabetic regime with strong recommendations for outpatient follow-up with PCP to tighten glycemic index for optimal control. 4. Primary hypertension: History, stable. Currently normotensive. Continue home regimen at discharge. 5. Head lice: Nix shampoo ordered, treatment rendered effective. 6. Chronic diastolic failure: No overt signs of decompensation. Continue BB and ACE at discharge. 7. Hyperlipidemia: Continue high intensity statin. Fasting lipid panel stable, triglycerides high-normal.   8. GERD: Continue PPI at discharge. 9. Depression and anxiety: Continue Zoloft at discharge. 10. Morbid obesity: BMI 51.58    Initial H and P and Hospital course:-  **Per Chart Review:  \"59 y. o. male who presented to [address] [place] [address] with AMS. Brother called EMS today. Please note this HPI is difficult to obtain.  Patient is unable to provide information, no family at 2 times daily     miconazole 2 % powder  Commonly known as: MICOTIN  Apply topically 2 times daily. CONTINUE taking these medications    Alcohol Swabs Pads     alogliptin 25 MG Tabs tablet  Commonly known as: NESINA     amLODIPine 10 MG tablet  Commonly known as: NORVASC  Take 1 tablet by mouth daily. aspirin EC 81 MG EC tablet  Take 1 tablet by mouth daily. atorvastatin 80 MG tablet  Commonly known as: LIPITOR     Digital Glass Scale Misc  1 Device by Does not apply route daily     ferrous sulfate 325 (65 Fe) MG tablet  Commonly known as: IRON 325  Take 1 tablet by mouth 2 times daily (with meals)     FreeStyle Freedom Lite w/Device Kit  by Does not apply route. Please dispense strips     FREESTYLE LITE strip  Generic drug: blood glucose test strips  TEST as directed four times a day     furosemide 40 MG tablet  Commonly known as: Lasix  Take 1 tablet by mouth daily     gabapentin 300 MG capsule  Commonly known as: NEURONTIN  take 1 capsule by mouth three times a day     Glucophage  MG extended release tablet  Generic drug: metFORMIN     hydrOXYzine 25 MG capsule  Commonly known as: VISTARIL     insulin glargine 100 UNIT/ML injection vial  Commonly known as: LANTUS  Inject 20 Units into the skin 2 times daily     insulin lispro 100 UNIT/ML injection vial  Commonly known as: HUMALOG  Inject 10 Units into the skin 3 times daily (with meals)     Insulin Pen Needle 31G X 8 MM Misc  Commonly known as: Unifine Pentips  by Does not apply route. Lancets Misc  Apply 1 each topically 2 times daily.      lansoprazole 15 MG delayed release capsule  Commonly known as: PREVACID     lisinopril 20 MG tablet  Commonly known as: PRINIVIL;ZESTRIL     magnesium oxide 400 (241.3 Mg) MG Tabs tablet  Commonly known as: MAG-OX  Take 1 tablet by mouth daily     metoprolol tartrate 25 MG tablet  Commonly known as: LOPRESSOR  Take 1 tablet by mouth 2 times daily     potassium chloride 20 MEQ extended release tablet  Commonly known as: KLOR-CON M  Take 1 tablet by mouth daily     sertraline 50 MG tablet  Commonly known as: ZOLOFT           Where to Get Your Medications      These medications were sent to [facility] [address], [address]  [address], [address] [facility] [address]    Phone: [phone]   · levETIRAcetam 500 MG tablet  · miconazole 2 % powder          Labs :-  Recent Results (from the past 72 hour(s))   EKG 12 Lead    Collection Time: 02/23/21  2:56 PM   Result Value Ref Range    Ventricular Rate 119 BPM    Atrial Rate 119 BPM    P-R Interval 176 ms    QRS Duration 76 ms    Q-T Interval 316 ms    QTc Calculation (Bazett) 444 ms    P Axis 57 degrees    R Axis -65 degrees    T Axis 46 degrees   CK    Collection Time: 02/23/21  3:00 PM   Result Value Ref Range    Total  55 - 170 U/L   Urine Drug Screen    Collection Time: 02/23/21  4:27 PM   Result Value Ref Range    AMPHETAMINE+METHAMPHETAMINE URINE SCREEN Negative NEGATIVE    Barbiturate Quant, Ur Negative NEGATIVE    Benzodiazepine Quant, Ur Negative NEGATIVE    Cannabinoid Quant, Ur POSITIVE NEGATIVE    Cocaine Metab Quant, Ur Negative NEGATIVE    Opiates, Urine Negative NEGATIVE    Oxycodone Negative NEGATIVE    PCP Quant, Ur Negative NEGATIVE   Urine with Reflexed Micro    Collection Time: 02/23/21  4:27 PM   Result Value Ref Range    Glucose, Ur 500 (A) NEGATIVE mg/dl    Bilirubin Urine NEGATIVE NEGATIVE    Ketones, Urine 40 (A) NEGATIVE    Specific Gravity, Urine 1.020 1.002 - 1.030    Blood, Urine MODERATE (A) NEGATIVE    pH, UA 7.5 5.0 - 9.0    Protein, UA >= 300 (A) NEGATIVE    Urobilinogen, Urine 0.2 0.0 - 1.0 eu/dl    Nitrite, Urine NEGATIVE NEGATIVE    Leukocyte Esterase, Urine NEGATIVE NEGATIVE    Color, UA YELLOW STRAW-YELLOW    Character, Urine CLEAR CLEAR-SL CLOUD    RBC, UA 3-5 0-2/hpf /hpf    WBC, UA 10-15 0-4/hpf /hpf    Epithelial Cells, UA 0-20-2 3-5/hpf /hpf    Bacteria, UA NONE FEW/NONE SEEN /hpf    Casts UA 0-4 HYALINE NONE SEEN /lpf    Crystals, UA NONE SEEN NONE SEEN    CASTS 2 0-4 FINE GRAN NONE SEEN /lpf   Culture, Reflexed, Urine    Collection Time: 02/23/21  4:27 PM    Specimen: Urine, clean catch   Result Value Ref Range    Urine Culture Reflex (A)      Growth of Contaminants. The mixture of organisms present are not a common cause of urinary tract infections and probably represent skin kyrie or distal urethral kyrie. Organism Growth of Contaminants   (A)    SPECIMEN REJECTION    Collection Time: 02/23/21  5:06 PM   Result Value Ref Range    Rejected Test COVPC     Reason for Rejection INVALID    CSF Cell Count with Differential    Collection Time: 02/23/21  5:21 PM   Result Value Ref Range    Color, CSF COLORLESS     Character, CSF CLEAR     Tube Volume Received CSF 2.0 ml    Total Nucleated Cells CSF 8 (H) 0 - 5 /cumm    RBC, CSF 19 0/cumm /cumm    Segs 62 (H) 0 - 6 %    Lymphs, CSF 27 0 - 90 %    Monocytes, CSF 8 0 - 45 %    Metamyelocyte, CSF 3 %    Pathologist Review STEPHANY GARCIA Protein, CSF    Collection Time: 02/23/21  5:21 PM   Result Value Ref Range    Protein, CSF 68 (H) 12 - 60 mg/dl   Glucose, CSF    Collection Time: 02/23/21  5:21 PM   Result Value Ref Range    Glucose, CSF 99 (H) 40 - 80 mg/dl   Hungary Ink Prep    Collection Time: 02/23/21  5:21 PM    Specimen: CSF   Result Value Ref Range    [facility] Ink: No encapsulated yeast observed.      HSV PCR    Collection Time: 02/23/21  5:21 PM   Result Value Ref Range    HSV Source red top serum    Meningitis Encephalitis Panel CSF, Molecular    Collection Time: 02/23/21  5:21 PM    Specimen: CSF   Result Value Ref Range    ESCHERICHIA COLI K1 CSF FILM ARRAY Not Detected Not Detected    HAEMOPHILUS INFLUENZA CSF FILM ARRAY Not Detected Not Detected    LISTERIA MONOCYTOGENES CSF FILM ARRAY Not Detected Not Detected    NEISSERIA MENIGITIDIS CSF FILM ARRAY Not Detected Not Detected Collection Time: 02/23/21 10:30 PM   Result Value Ref Range    POC Glucose 186 (H) 70 - 108 mg/dl   POCT Glucose    Collection Time: 02/24/21  2:28 AM   Result Value Ref Range    POC Glucose 151 (H) 70 - 108 mg/dl   Lipid panel    Collection Time: 02/24/21  3:45 AM   Result Value Ref Range    Cholesterol, Total 167 100 - 199 mg/dL    Triglycerides 201 (H) 0 - 199 mg/dL    HDL 38 mg/dL    LDL Calculated 89 mg/dL   COVID-19, Rapid    Collection Time: 02/24/21  7:00 AM   Result Value Ref Range    SARS-CoV-2, NAAT NOT DETECTED NOT DETECTED   POCT Glucose    Collection Time: 02/24/21  7:58 AM   Result Value Ref Range    POC Glucose 198 (H) 70 - 108 mg/dl   POCT Glucose    Collection Time: 02/24/21 12:04 PM   Result Value Ref Range    POC Glucose 259 (H) 70 - 108 mg/dl   POCT Glucose    Collection Time: 02/24/21  3:31 PM   Result Value Ref Range    POC Glucose 248 (H) 70 - 108 mg/dl   POCT glucose    Collection Time: 02/24/21  8:30 PM   Result Value Ref Range    POC Glucose 211 (H) 70 - 108 mg/dl   EKG 12 Lead    Collection Time: 02/25/21  3:33 AM   Result Value Ref Range    Ventricular Rate 71 BPM    Atrial Rate 300 BPM    QRS Duration 78 ms    Q-T Interval 402 ms    QTc Calculation (Bazett) 436 ms    R Axis -59 degrees    T Axis 13 degrees   CBC    Collection Time: 02/25/21  4:21 AM   Result Value Ref Range    WBC 10.6 4.8 - 10.8 thou/mm3    RBC 5.14 4.70 - 6.10 mill/mm3    Hemoglobin 15.3 14.0 - 18.0 gm/dl    Hematocrit 48.3 42.0 - 52.0 %    MCV 94.0 80.0 - 94.0 fL    MCH 29.8 26.0 - 33.0 pg    MCHC 31.7 (L) 32.2 - 35.5 gm/dl    RDW-CV 13.3 11.5 - 14.5 %    RDW-SD 45.9 (H) 35.0 - 45.0 fL    Platelets 699 156 - 575 thou/mm3    MPV 11.6 9.4 - 12.4 fL   Basic Metabolic Panel w/ Reflex to MG    Collection Time: 02/25/21  4:21 AM   Result Value Ref Range    Sodium 137 135 - 145 meq/L    Potassium reflex Magnesium 4.0 3.5 - 5.2 meq/L    Chloride 102 98 - 111 meq/L    CO2 23 23 - 33 meq/L    Glucose 228 (H) 70 - 108 mg/dL BUN 21 7 - 22 mg/dL    CREATININE 1.4 (H) 0.4 - 1.2 mg/dL    Calcium 8.8 8.5 - 10.5 mg/dL   Phosphorus    Collection Time: 02/25/21  4:21 AM   Result Value Ref Range    Phosphorus 3.4 2.4 - 4.7 mg/dL   Hemoglobin A1C    Collection Time: 02/25/21  4:21 AM   Result Value Ref Range    Hemoglobin A1C 8.5 (H) 4.4 - 6.4 %    AVERAGE GLUCOSE 195 (H) 70 - 126 mg/dL   Anion Gap    Collection Time: 02/25/21  4:21 AM   Result Value Ref Range    Anion Gap 12.0 8.0 - 16.0 meq/L   Glomerular Filtration Rate, Estimated    Collection Time: 02/25/21  4:21 AM   Result Value Ref Range    Est, Glom Filt Rate 51 (A) ml/min/1.73m2   POCT glucose    Collection Time: 02/25/21  6:29 AM   Result Value Ref Range    POC Glucose 220 (H) 70 - 108 mg/dl   POCT glucose    Collection Time: 02/25/21 10:48 AM   Result Value Ref Range    POC Glucose 198 (H) 70 - 108 mg/dl   POCT glucose    Collection Time: 02/25/21  3:23 PM   Result Value Ref Range    POC Glucose 215 (H) 70 - 108 mg/dl   POCT glucose    Collection Time: 02/25/21  7:51 PM   Result Value Ref Range    POC Glucose 276 (H) 70 - 108 mg/dl   CBC    Collection Time: 02/26/21  3:18 AM   Result Value Ref Range    WBC 5.6 4.8 - 10.8 thou/mm3    RBC 4.74 4.70 - 6.10 mill/mm3    Hemoglobin 14.0 14.0 - 18.0 gm/dl    Hematocrit 44.3 42.0 - 52.0 %    MCV 93.5 80.0 - 94.0 fL    MCH 29.5 26.0 - 33.0 pg    MCHC 31.6 (L) 32.2 - 35.5 gm/dl    RDW-CV 13.1 11.5 - 14.5 %    RDW-SD 45.1 (H) 35.0 - 45.0 fL    Platelets 260 800 - 861 thou/mm3    MPV 11.6 9.4 - 12.4 fL   Basic Metabolic Panel w/ Reflex to MG    Collection Time: 02/26/21  3:18 AM   Result Value Ref Range    Sodium 133 (L) 135 - 145 meq/L    Potassium reflex Magnesium 3.4 (L) 3.5 - 5.2 meq/L    Chloride 102 98 - 111 meq/L    CO2 22 (L) 23 - 33 meq/L    Glucose 258 (H) 70 - 108 mg/dL    BUN 19 7 - 22 mg/dL    CREATININE 1.1 0.4 - 1.2 mg/dL    Calcium 8.4 (L) 8.5 - 10.5 mg/dL   Phosphorus    Collection Time: 02/26/21  3:18 AM   Result Value Ref Range    Phosphorus 3.4 2.4 - 4.7 mg/dL   Anion Gap    Collection Time: 02/26/21  3:18 AM   Result Value Ref Range    Anion Gap 9.0 8.0 - 16.0 meq/L   Glomerular Filtration Rate, Estimated    Collection Time: 02/26/21  3:18 AM   Result Value Ref Range    Est, Glom Filt Rate 67 (A) ml/min/1.73m2   Magnesium    Collection Time: 02/26/21  3:18 AM   Result Value Ref Range    Magnesium 1.7 1.6 - 2.4 mg/dL   POCT glucose    Collection Time: 02/26/21  7:11 AM   Result Value Ref Range    POC Glucose 236 (H) 70 - 108 mg/dl   POCT glucose    Collection Time: 02/26/21 10:55 AM   Result Value Ref Range    POC Glucose 275 (H) 70 - 108 mg/dl        Microbiology:    Blood culture #1:   Lab Results   Component Value Date    BC No growth-preliminary  02/23/2021       Blood culture #2:No results found for: BLOODCULT2    Organism:    Lab Results   Component Value Date    LABGRAM  02/23/2021     Rare segmented neutrophils observed. No organisms observed. performed on cytospun specimen        MRSA culture only:No results found for: [facility]    Urine culture:   Lab Results   Component Value Date    LABURIN 200 04/10/2014     Lab Results   Component Value Date    ORG Growth of Contaminants   02/23/2021        Respiratory culture: No results found for: CULTRESP    Aerobic and Anaerobic :  Lab Results   Component Value Date    LABAERO  03/28/2016     Culture also yielded moderate growth of Enterococcus sp. If a true mixed infection is suspected, then broad spectrum  empiric antibiotic therapy is indicated. Current antibiotic therapy may beineffective in vitro for at  least one of culture isolates. LABAERO  03/28/2016     moderate growth  In the treatment of gram positive infections, GENTAMICIN  should be CONSIDERED a SYNERGYSTIC agent ONLY. Ciprofloxacin and Levofloxacin, regardless of in vitro  sensitivity, should not be used for staphylococcal infections  other than uncomplicated lower UTIs.   Moxifloxacin, regardless of in vitro sensitivity, should  not be used for staphylococcal infections. LABAERO very light growth 03/28/2016    LABAERO very light growth 03/28/2016     Lab Results   Component Value Date    LABANAE No growth-preliminary   02/23/2021       Urinalysis:      Lab Results   Component Value Date    NITRU NEGATIVE 02/23/2021    WBCUA 10-15 02/23/2021    BACTERIA NONE 02/23/2021    RBCUA 3-5 02/23/2021    BLOODU MODERATE 02/23/2021    GLUCOSEU 500 02/23/2021       Radiology:-  Ct Head Wo Contrast    Result Date: 2/23/2021  PROCEDURE: CT HEAD WO CONTRAST CLINICAL INFORMATION: ams. COMPARISON: Head CT 3/7/2018. TECHNIQUE: Noncontrast 5 mm axial images were obtained through the brain. Sagittal and coronal reconstructions were obtained. All CT scans at this facility use dose modulation, iterative reconstruction, and/or weight-based dosing when appropriate to reduce radiation dose to as low as reasonably achievable. FINDINGS: No hydrocephalus, midline shift, extra-axial fluid collection or intracranial hemorrhage. Sequelae of mild chronic microvascular ischemic disease. Incidental note of intracranial vascular calcification. Mastoids and middle ears are clear. Likely cerumen within the right external auditory canal. Mucous retention cyst of the right maxillary sinus. Status post left lens replacement. The orbits are intact. Calvarium and skull base are intact. 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 Bilateral degenerative changes of the temporomandibular joints. No acute intracranial process. **This report has been created using voice recognition software. It may contain minor errors which are inherent in voice recognition technology. ** Final report electronically signed by Dr. Karuna Oneil on 2/23/2021 2:54 PM    Xr Chest Portable    Result Date: 2/23/2021  PROCEDURE: XR CHEST PORTABLE CLINICAL INFORMATION: shortness of breath .  COMPARISON: 4/20/2020 TECHNIQUE: Portable semiupright FINDINGS: Very low lung volumes. Heart size is normal. Mediastinum is not widened. No airspace consolidations or pleural effusions identified. Pulmonary vessels are not congested. EKG leads overlie the chest.     Very low lung volumes. No acute cardiopulmonary process otherwise **This report has been created using voice recognition software. It may contain minor errors which are inherent in voice recognition technology. ** Final report electronically signed by Dr. Salud Zimmer on 2/23/2021 1:45 PM    Ir Lumbar Puncture For Diagnosis    Result Date: 2/23/2021  LUMBAR PUNCTURE WITH FLUOROSCOPIC GUIDANCE: CLINICAL INFORMATION:  altered mental status PERFORMED BY: Zuleika Hoover M.D. APPROACH: Left L4-L5 translaminar NEEDLE: 22-gauge spinal needle FLUID: 7.5 mL clear colorless CSF FLUOROSCOPY TIME: 3.2 FLUOROSCOPIC IMAGES: 9 DOSE (MGY):549 PROCEDURE:  Signed informed consent was obtained prior to performing this procedure. Following local anesthesia and utilizing aseptic technique, a spinal needle was successfully inserted into the lumbar thecal sac at the level listed above. The amount of spinal fluid listed above was obtained in four separate vials, and sent to the laboratory for diagnostic testing. Status post successful lumbar puncture. Of note, only 7.5 mL of fluid was obtained due to patient's inability to remain still on the table due to mental status change. Opening pressures could also not be obtained for the same reason. **This report has been created using voice recognition software. It may contain minor errors which are inherent in voice recognition technology. ** Final report electronically signed by Dr. Carly Sanchez on 2/23/2021 6:12 PM    Follow-up scheduled after discharge :-    in 1-2 weeks with REI Cueva - NP  in 4-6 weeks with Neurology    Consultations during this hospital stay:-  [] NONE [] Cardiology  [x] Nephrology  [] Hemo onco   [] GI   [x] ID  [] Endocrine  [] Pulm    [] Neuro    [] Psych   [] Urology [] ENT   [] G SURGERY   []Ortho    []CV surg    [] Palliative  [] Hospice [] Pain management   [x]    []TCU   [x] PT/OT  OTHERS:-    Disposition: home  Condition at Discharge: Stable    Time Spent:- 45 minutes    **This report has been created using voice recognition software. It may contain minor errors which are inherent in voice recognition technology. **  Electronically signed by Shawna Goldberg, APRN - CNP on 2/26/2021 at 2:49 PM  Discharging Hospitalist

## 2021-02-26 NOTE — PROGRESS NOTES
Encounter Date: 2/26/2021    Portions of this notes is copied from previous physician encounters, reviewed and edited appropriately. Jeanne Dietz is a 60 y.o. male admitted to [address] [place] [address] on 2/23/2021. Patient has history of depression, gout, dyslipidemia and hypertension. He also has diabetes and related neuropathy. Last known normal was 2 days back. Patient was brought in today with altered mental status and unable to follow commands. He is arousable but lethargic. He has some tremulousness of his bilateral extremities and jaw.     Patient has movements in bilateral extremities but the right upper and lower extremity appears to be less active compared to the left. Patient is a poor historian given his altered mental status. Unable to reach family. Lumbar puncture was positive for 8 nucleated cells predominantly neutrophils. Bacterial and viral profile on CSF negative. CSF culture pending       OVERNIGHT: Patient is stable. Denies any focal deficits. Review of systems   No neck stiffness  No photophobia  All systems reviewed and are negative.          Current Facility-Administered Medications   Medication Dose Route Frequency Provider Last Rate Last Admin    miconazole (MICOTIN) 2 % powder   Topical BID Mario Miranda APRN - CNP   Given at 02/25/21 2116    insulin lispro (HUMALOG) injection vial 0-12 Units  0-12 Units Subcutaneous TID WC Mario Scruggson, APRN - CNP   4 Units at 02/25/21 1822    insulin lispro (HUMALOG) injection vial 0-6 Units  0-6 Units Subcutaneous Nightly Mario Miranda, APRN - CNP   3 Units at 02/25/21 2116    levETIRAcetam (KEPPRA) tablet 500 mg  500 mg Oral BID Miki Maddox MD   500 mg at 02/25/21 2115    glucose (GLUTOSE) 40 % oral gel 15 g  15 g Oral PRN Mario Miranda APRN - CNP        dextrose 50 % IV solution  12.5 g Intravenous PRN Mario Miranda APRN - CNP        glucagon (rDNA) injection 1 mg  1 mg Intramuscular PRN REI Whitaker Desirezoya, APRN - CNP   650 mg at 02/25/21 9844    Or    acetaminophen (TYLENOL) suppository 650 mg  650 mg Rectal Q6H PRN Sole Bustos Kimberleyabiel, APRN - CNP        0.9 % sodium chloride infusion   Intravenous Continuous Te Posada APRN - CNP 75 mL/hr at 02/26/21 0402 Restarted at 02/26/21 0402     Allergies   Allergen Reactions    Aripiprazole Other (See Comments)     Pain in lower back    Aripiprazole      Other reaction(s): Abilify, unable to straighten up when taking, unable to straighten up when taking    Citalopram Hydrobromide Other (See Comments)     Muscle aches and pain       PHYSICAL EXAMINATION:    Vitals:   Patient Vitals for the past 4 hrs:   BP   02/26/21 0649 (!) 152/88           General:  pleasant in no acute distress. HEENT: No pallor or icterus. Neck supple. No Carotid bruits. Heart: S1 and S2 normal with regular rhythm. No murmur. GENERAL NEUROLOGIC EXAM     Mental Status:   Awake alert and oriented to person place and time. Language is normal for comprehension and fluency. Attention span and concentration were normal.       Cranial nerves:      Pupils are equal regular and reactive to light. Muscles of facial expression were strong and symmetric. Jaw tremulousness much better compared to yesterday. Motor: Normal tone and bulk with no involuntary movements or pronator drift. Strength normal 5/5 in bilateral upper and lower extremities. Sensation: Light touch, pin prick, vibration and joint position sense were normal in the upper and lower extremities. Reflexes: 2+ bilaterally symmetrical with down going toes. Coordination: finger nose and heel shin test normal bilaterally.      Gait:  Not tested because of risk of fall         Labs:     CBC:   Recent Labs     02/23/21  1351 02/25/21  0421 02/26/21  0318   WBC 11.9* 10.6 5.6   HGB 17.0 15.3 14.0    152 140   MCV 95.2* 94.0 93.5   MCH 30.1 29.8 29.5   MCHC 31.6* 31.7* 31.6*     CMP:  Recent Labs 02/23/21  1351 02/25/21  0421 02/26/21  0318   * 137 133*   K 4.2 4.0 3.4*    102 102   CO2 19* 23 22*   BUN 14 21 19   CREATININE 1.0 1.4* 1.1   LABGLOM 75* 51* 67*   GLUCOSE 233* 228* 258*   CALCIUM 9.0 8.8 8.4*     Liver:   Recent Labs     02/23/21  1351   AST 22   ALT 12   ALKPHOS 82   PROT 7.1   LABALBU 3.2*   BILITOT 0.5     Stroke Labs:   Recent Labs     02/23/21  1351 02/24/21  0345 02/25/21  0421   TSH 2.110  --   --    LDLCALC  --  89  --    LABA1C  --   --  8.5*       Imaging:  No results found for this or any previous visit. No results found for this or any previous visit. No results found for this or any previous visit. No results found for this or any previous visit. No results found for this or any previous visit. No results found for this or any previous visit. No results found for this or any previous visit. Results for orders placed during the hospital encounter of 02/23/21   CT HEAD WO CONTRAST    Narrative PROCEDURE: CT HEAD WO CONTRAST    CLINICAL INFORMATION: ams. COMPARISON: Head CT 3/7/2018. TECHNIQUE: Noncontrast 5 mm axial images were obtained through the brain. Sagittal and coronal reconstructions were obtained. All CT scans at this facility use dose modulation, iterative reconstruction, and/or weight-based dosing when appropriate to reduce radiation dose to as low as reasonably achievable. FINDINGS:    No hydrocephalus, midline shift, extra-axial fluid collection or intracranial hemorrhage. Sequelae of mild chronic microvascular ischemic disease. Incidental note of intracranial vascular calcification. Mastoids and middle ears are clear. Likely cerumen within the right external auditory canal. Mucous retention cyst of the right maxillary sinus. Status post left lens replacement. The orbits are intact. Calvarium and skull base are intact. 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  Bilateral degenerative changes of the temporomandibular joints. Impression  No acute intracranial process. **This report has been created using voice recognition software. It may contain minor errors which are inherent in voice recognition technology. **    Final report electronically signed by Dr. Ryan Gupta on 2/23/2021 2:54 PM       Active Problems:    AMS (altered mental status)    Encephalopathy  Resolved Problems:    * No resolved hospital problems. *       ASSESSMENT/PLAN:   This is a 60 y.o. male who  has a past medical history of Depression, Gout, Hyperlipidemia, Hypertension, Neuropathy, Pneumonia, and Type II or unspecified type diabetes mellitus without mention of complication, not stated as uncontrolled. He presents with altered mental status. On exam patient is lethargic and has some jaw tremulousness. He also has some gaze preference to the left and mild reduced activity in the right upper and lower extremity. He has some rash on the lower extremity. Patient is much better today.     1. Acute encephalopathy for evaluation: ?  Meningoencephalitis:   ID is following the patient. Brain MRI normal    2. Possible seizures:  -EEG abnormal for some delta activity: Continue Keppra 500 twice daily. Follow up with neurology as out patient with repeat eeg in 4-6 weeeks. /     we will sign off. Patient can be discharged from neurological standpoint. Extremely guarded prognosis. Thank you for allowing us to participate in the care of this patient.     Electronically signed by Melina Gómez MD on 2/26/2021 at 8:11 AM

## 2021-02-26 NOTE — PLAN OF CARE
responding to false sensory perceptions  Outcome: Met This Shift     Problem: Sensory Perception - Impaired:  Goal: Demonstrates accurate environmental perceptions  Description: Demonstrates accurate environmental perceptions  Outcome: Met This Shift     Problem: Sensory Perception - Impaired:  Goal: Able to distinguish between reality-based and nonreality-based thinking  Description: Able to distinguish between reality-based and nonreality-based thinking  Outcome: Met This Shift     Problem: Sensory Perception - Impaired:  Goal: Able to interrupt nonreality-based thinking  Description: Able to interrupt nonreality-based thinking  Outcome: Met This Shift     Problem: Confusion - Acute:  Goal: Mental status will be restored to baseline  Description: Mental status will be restored to baseline  Outcome: Ongoing  Note: Patient is A&Ox4 with no signs of confusion. Patient follows commands and expresses needs. Problem: Discharge Planning:  Goal: Ability to perform activities of daily living will improve  Description: Ability to perform activities of daily living will improve  Outcome: Ongoing  Note: Patient working with therapies. Currently ambulates using 3 person assist. Patient is able to transfer himself into a wheelchair and ambulate at home. Problem: Discharge Planning:  Goal: Participates in care planning  Description: Participates in care planning  Outcome: Ongoing     Problem: Injury - Risk of, Physical Injury:  Goal: Absence of physical injury  Description: Absence of physical injury  Outcome: Ongoing     Problem: Injury - Risk of, Physical Injury:  Goal: Will remain free from falls  Description: Will remain free from falls  Outcome: Ongoing  Note: Patient remained free from falls this shift. Bed is in low position with alarm on and siderails up x2. Education given on use of call light and patient voiced understanding. Call light and beside table within reach. Arm band and falling star in place.  Hourly rounds completed. Will continue to monitor. Problem: Mood - Altered:  Goal: Mood stable  Description: Mood stable  Outcome: Ongoing     Problem: Sleep Pattern Disturbance:  Goal: Appears well-rested  Description: Appears well-rested  Outcome: Ongoing  Note: Patient able to sleep throughout night. Problem: Skin Integrity:  Goal: Will show no infection signs and symptoms  Description: Will show no infection signs and symptoms  Outcome: Ongoing     Problem: Skin Integrity:  Goal: Absence of new skin breakdown  Description: Absence of new skin breakdown  Outcome: Ongoing  Note: No signs of new skin breakdown noted with each assessment this shift. Skin warm, dry, and intact except where otherwise noted in head-to-toe assessment. Mucous membranes pink and moist. Assistance with turns/ambulation given as needed. Problem: Falls - Risk of:  Goal: Absence of physical injury  Description: Absence of physical injury  Outcome: Ongoing     Problem: Falls - Risk of:  Goal: Will remain free from falls  Description: Will remain free from falls  Outcome: Ongoing  Note: Patient remained free from falls this shift. Bed is in low position with alarm on and siderails up x2. Education given on use of call light and patient voiced understanding. Call light and beside table within reach. Arm band and falling star in place. Hourly rounds completed. Will continue to monitor. Problem: DISCHARGE BARRIERS  Goal: Patient's continuum of care needs are met  Outcome: Ongoing  Note: Patient plans to possibly be discharged to [facility]. Plan discussed with patient. Problem: Physical Regulation:  Goal: Will remain free from infection  Description: Will remain free from infection  Outcome: Ongoing      Care plan reviewed with patient. Patient verbalizes understanding of the plan of care and contribute to goal setting.

## 2021-02-26 NOTE — PROGRESS NOTES
Progress note: Infectious diseases    Patient - Diaz Bryant,  Age - 60 y.o.    - 1955      Room Number - 4A-04/004-A   MRN -  [id number]   Acct # - [de-identified]  Date of Admission -  2021  1:06 PM    SUBJECTIVE:   He has no new complaints   he feels better  OBJECTIVE   VITALS    height is 5' 10\" (1.778 m) and weight is 359 lb 8 oz (163.1 kg) (abnormal). His axillary temperature is 98.8 °F (37.1 °C). His blood pressure is 186/61 (abnormal) and his pulse is 66. His respiration is 20 and oxygen saturation is 96%.        Wt Readings from Last 3 Encounters:   21 (!) 359 lb 8 oz (163.1 kg)   20 (!) 339 lb (153.8 kg)   20 (!) 356 lb 0.7 oz (161.5 kg)       I/O (24 Hours)    Intake/Output Summary (Last 24 hours) at 2021 1028  Last data filed at 2021 1400  Gross per 24 hour   Intake 1053.02 ml   Output --   Net 1053.02 ml       General Appearance  Awake, alert, oriented,  not  In acute distress  HEENT - normocephalic, atraumatic, pink conjunctiva,  anicteric sclera  Neck - Supple, no mass  Lungs -  Bilateral   air entry, no rhonchi, no wheeze  Cardiovascular - Heart sounds are normal.     Abdomen - soft, not distended, nontender,   Neurologic -oriented, +tremors  Skin - No bruising or bleeding  Extremities - No edema, no cyanosis, clubbing     MEDICATIONS:      miconazole   Topical BID    insulin lispro  0-12 Units Subcutaneous TID WC    insulin lispro  0-6 Units Subcutaneous Nightly    levETIRAcetam  500 mg Oral BID    hydrALAZINE  25 mg Oral 3 times per day    amLODIPine  10 mg Oral Daily    aspirin EC  81 mg Oral Daily    atorvastatin  80 mg Oral Nightly    ferrous sulfate  325 mg Oral BID WC    pantoprazole  40 mg Oral QAM AC    lisinopril  20 mg Oral Daily    metoprolol tartrate  25 mg Oral BID    sertraline  50 mg Oral Daily    sodium chloride flush  10 mL Intravenous 2 A41.9    Community acquired pneumonia J18.9    Diabetic ketoacidosis without coma associated with type 2 diabetes mellitus ([facility] E11.10    Diabetic ketoacidosis without coma associated with type 1 diabetes mellitus ([facility] E10.10    Acute respiratory failure with hypoxemia ([facility]) J96.01    Symptomatic anemia D64.9    Microcytic anemia D50.9    Anasarca R60.1    Abnormal thyroid function test R94.6    Type 2 diabetes mellitus with other specified complication ([facility]) X66.90    Anxiety and depression F41.9, F32.9    Family history of tobacco use Z81.2    Morbid obesity ([facility]) E66.01    Excoriation of lower leg, sequela S80.819S    Iron deficiency anemia D50.9    Acute diastolic (congestive) heart failure ([facility]) I50.31    Nocturnal hypoxia G47.34    AMS (altered mental status) R41.82    Encephalopathy G93.40         ASSESSMENT/PLAN   Encephalopathy resolved:   positive for cannabis use. Fever: has low lung volume. Will encourage incentive spirometry. Headache:better  MRI: no acute process  Hx of CHF  Continue therapy.         Amalia Dai MD, [address] 2/26/2021 10:28 AM

## 2021-02-26 NOTE — PROGRESS NOTES
[facility]  INPATIENT PHYSICAL THERAPY  EVALUATION  [facility] 4A - 4A-04/004-A    Time In: 1500  Time Out: 1528  Timed Code Treatment Minutes: 13 Minutes  Minutes: 28          Date: 2021  Patient Name: Destiney Badillo,  Gender:  male        MRN: [id number]  : 1955  (60 y.o.)      Referring Practitioner: REI Moreno CNP  Diagnosis: AMS (altered mental status)  Additional Pertinent Hx: 60 y.o. male who presents to the emergency department for evaluation of shortness of breath and altered mental status. Blood glucose in the 200 per squad with saturations in the mid 90s. Patient not communicating well at time of evaluation,     Restrictions/Precautions:  Restrictions/Precautions: Fall Risk, General Precautions    Vitals: not tested. Subjective:  Chart Reviewed: Yes  Patient assessed for rehabilitation services?: Yes  Subjective: Pt up on BSC and ready to return to bed. General:  Overall Orientation Status: Within Functional Limits    Vision: Within Functional Limits    Hearing: Within functional limits         Pain: not reported      Social/Functional History:    Lives With: Family(2 brothers, nephew and girlfrien)  Type of Home: Trailer  Home Layout: One level  Home Equipment: 4 wheeled walker     Bathroom Equipment: 3-in-1 commode    Receives Help From: Family  ADL Assistance: Independent     Ambulation Assistance: (very limited for bed to [facility] with walker)  Transfer Assistance: Independent          Additional Comments: pt stating he stays in his bedroom either laying in bed or sitting at EOB. Pt stating he just transfer to [facility].  Pt statig he doesn't ambulate becaue he cant fit his walker down the hallways    OBJECTIVE:  Range of Motion:  Bilateral Lower Extremity: WFL    Strength:  Bilateral Lower Extremity: grossly 4/5, but limited endurance    Balance:  Static Sitting Balance:  Supervision  Dynamic Sitting Balance: Supervision  Static Standing Balance: Contact Guard Assistance  Dynamic Standing Balance: Minimal Assistance, X 2, due to knees starting to buckle while standing. Bed Mobility:  Sit to Supine: Stand By Assistance     Transfers:  Sit to Stand: Minimal Assistance, cues for hand placement  Stand to Sit:Contact Guard Assistance  Stand Pivot: Moderate Assistance, X 2 to shift hips toward end of transfer as pt's knees were showing signs of buckling. Ambulation:  Not tested, Stand-pivot only due to weakness and low endurance for standing    Exercise:  Patient was guided in 1 set(s) 10 reps of exercise to both lower extremities. Ankle pumps, Glut sets, Quad sets, Heelslides, Hip abduction/adduction, Seated marches and Long arc quads. Exercises were completed for increased independence with functional mobility. Functional Outcome Measures: Completed  AM-PAC Inpatient Mobility Raw Score : 13  AM-PAC Inpatient T-Scale Score : 36.74    ASSESSMENT:  Activity Tolerance:  Patient tolerance of  treatment: good. minus      Treatment Initiated: Treatment and education initiated within context of evaluation. Evaluation time included review of current medical information, gathering information related to past medical, social and functional history, completion of standardized testing, formal and informal observation of tasks, assessment of data and development of plan of care and goals. Treatment time included skilled education and facilitation of tasks to increase safety and independence with functional mobility for improved independence and quality of life. Assessment: Body structures, Functions, Activity limitations: Decreased functional mobility , Decreased endurance, Decreased strength, Decreased balance  Assessment: Pt is a very morbidly obese 60 y.o. male that is requiring CGA to Min A for sit <> stand, but has limited endurance for standing. Pt required Min to Mod A x 2 to safely complete stand-pivot due to fatigue.  Pt would benefit from continued skilled PT to address strengthening, endurance building, and functional mobility. Prognosis: Good    REQUIRES PT FOLLOW UP: Yes    Discharge Recommendations:  Discharge Recommendations: Continue to assess pending progress, Subacute/Skilled Nursing Facility, Home with Home health PT    Patient Education:  PT Education: Goals, PT Role, Plan of Care, Home Exercise Program    Equipment Recommendations:  Equipment Needed: No    Plan:  Times per week: 3-5x GM  Current Treatment Recommendations: Strengthening, Gait Training, Balance Training, Functional Mobility Training, Endurance Training, Transfer Training, Safety Education & Training, Home Exercise Program, Patient/Caregiver Education & Training    Goals:  Patient goals : go home  Short term goals  Time Frame for Short term goals: at discharge  Short term goal 1: Pt to be Supervision for supine <> sit to get up to ambulate  Short term goal 2: Pt to be Supervision for sit <> stand to get up to complete stand-pivot or short ambulation  Short term goal 3: Pt to be Supervision for stand pivot with/without AD for transfers between seats  Long term goals  Time Frame for Long term goals : not set due to short ELOS    Following session, patient left in safe position with all fall risk precautions in place. Jessica Dong.  Madi Marcial, Michaelplanmiguel angel [place] [address]

## 2021-02-26 NOTE — PROGRESS NOTES
Assistance, Maximum Assistance and X 2. onto [facility] with educaiton on safety d/t pt wanting to attempt to sit to early. BALANCE:  Sitting Balance:  Stand By Assistance. Standing Balance: Moderate Assistance, X 2. with bilatearl UE support on walker with pt having diffiuclty coming into full standing    BED MOBILITY:  Supine to Sit: Stand By Assistance      TRANSFERS:  Sit to Stand: Moderate Assistance, X 2. form eOB   Stand to Sit: Moderate Assistance, X 2.      FUNCTIONAL MOBILITY:  Assistive Device: Rolling Walker  Assist Level: Moderate Assistance, Maximum Assistance and X 2. Distance: to [facility]   Max cues for safety during          Activity Tolerance:  Patient tolerance of  treatment: fair. Assessment:  Assessment: Pt demo decreased safety awareness to complete his ADL tasks and tranfser. Pt would benefit froms killed OT services to icnrease his indep with aDL tasks and to educate on safety throughout. Performance deficits / Impairments: Decreased functional mobility , Decreased safe awareness, Decreased balance, Decreased ADL status, Decreased cognition, Decreased endurance, Decreased strength  Prognosis: Fair  REQUIRES OT FOLLOW UP: Yes  Decision Making: Medium Complexity    Treatment Initiated: Treatment and education initiated within context of evaluation. Evaluation time included review of current medical information, gathering information related to past medical, social and functional history, completion of standardized testing, formal and informal observation of tasks, assessment of data and development of plan of care and goals. Treatment time included skilled education and facilitation of tasks to increase safety and independence with ADL's for improved functional independence and quality of life.     Discharge Recommendations:  Subacute/Skilled Nursing Facility(Pt not safe to return home)    Patient Education:  OT Education: OT Role, Plan of Care  Patient Education: safety with transfers    Equipment Recommendations:no equipemnt needed       Plan:  Times per week: 3-5x  Current Treatment Recommendations: Strengthening, Endurance Training, Patient/Caregiver Education & Training, Self-Care / ADL, Balance Training, Functional Mobility Training, Safety Education & Training. See long-term goal time frame for expected duration of plan of care. If no long-term goals established, a short length of stay is anticipated. Goals:  Patient goals : go home  Short term goals  Time Frame for Short term goals: by discharge  Short term goal 1: Pt to complete sit to stand from various surfaces including BSC with mod A x2 consistently to complete toileting tasks  Short term goal 2: Pt to complete stand pivot from various surfaces with mod A x2 to icnrease indep with toielting  Short term goal 3: Pt to complete to complete toileting tas with mod A         Following session, patient left in safe position with all fall risk precautions in place.

## 2021-02-26 NOTE — CARE COORDINATION
2/26/21, 9:59 AM EST    DISCHARGE PLANNING EVALUATION  Spoke with Emir Betts this morning. Discussed home with home health or ECF at discharge. SW voiced concern that he is not moving well. He told SW that his nephew is very big and strong and would be able to help move him as needed. He is going to talk to his brother about possibly going to an ECF. SW asked that he have an answer by 1:00pm.      2/26/21, 2:37 PM EST    Patient goals/plan/ treatment preferences discussed by  and . Patient goals/plan/ treatment preferences reviewed with patient/ family. Patient/ family verbalize understanding of discharge plan and are in agreement with goal/plan/treatment preferences. Understanding was demonstrated using the teach back method. AVS provided by RN at time of discharge, which includes all necessary medical information pertaining to the patients current course of illness, treatment, post-discharge goals of care, and treatment preferences. Services After Discharge  Services At/After Discharge: Nursing Services, OT, PT, Aide Services([address])      Emir Betts is refusing to go to an ECF. He is aware that staff is recommending ECF and has concerns about his safety at home. He told MARLENY that he spoke with his brother and he wants him to return home. He is agreeable to [facility] for RN, PT,OT, aid and Ezequiel Hunger him aware that if he is not doing well at home he needs to contact the home health agency and they would be able to help with ECF placement if needed. He voiced understanding. Made referral to [facility] and made her aware of staff concerns related to patient returning home. She is aware he will be discharged today. He told SW that he will have a ride home.

## 2021-02-27 LAB
HERPES SIMPLEX VIRUS BY PCR: NOT DETECTED
HSV SOURCE: NORMAL

## 2021-03-01 LAB
ANAEROBIC CULTURE: NORMAL
BLOOD CULTURE, ROUTINE: NORMAL
BLOOD CULTURE, ROUTINE: NORMAL
CMV BY PCR, QUALITATIVE CSF: NOT DETECTED
CMV SOURCE: NORMAL
CSF CULTURE: NORMAL
EPSTEIN BARR VIRUS BY PCR (BLOOD): NORMAL

## 2021-03-05 NOTE — PROGRESS NOTES
CLINICAL PHARMACY NOTE: MEDS TO [address] Select Patient?: No  Total # of Prescriptions Filled: 3   The following medications were delivered to the patient:  Levetiracetam 500mg  Hydralazine 25mg  MicroGuard 2% powder  Total # of Interventions Completed: 2  Time Spent (min): 30    Additional Documentation:

## 2021-04-21 ENCOUNTER — HOSPITAL ENCOUNTER (OUTPATIENT)
Age: 66
Setting detail: SPECIMEN
Discharge: HOME | End: 2021-04-21
Payer: COMMERCIAL

## 2021-04-21 LAB
ABSOLUTE EOS #: 0.11 K/UL (ref 0–0.44)
ABSOLUTE IMMATURE GRANULOCYTE: 0.03 K/UL (ref 0–0.3)
ABSOLUTE LYMPH #: 1.74 K/UL (ref 1.1–3.7)
ABSOLUTE MONO #: 0.63 K/UL (ref 0.1–1.2)
ALBUMIN SERPL-MCNC: 3.6 G/DL (ref 3.5–5.2)
ALBUMIN/GLOBULIN RATIO: 1.2 (ref 1–2.5)
ALP BLD-CCNC: 71 U/L (ref 40–129)
ALT SERPL-CCNC: 12 U/L (ref 5–41)
ANION GAP SERPL CALCULATED.3IONS-SCNC: 12 MMOL/L (ref 9–17)
AST SERPL-CCNC: 17 U/L
BASOPHILS # BLD: 1 % (ref 0–2)
BASOPHILS ABSOLUTE: 0.11 K/UL (ref 0–0.2)
BILIRUB SERPL-MCNC: 0.5 MG/DL (ref 0.3–1.2)
BUN BLDV-MCNC: 13 MG/DL (ref 8–23)
BUN/CREAT BLD: ABNORMAL (ref 9–20)
CALCIUM SERPL-MCNC: 8.7 MG/DL (ref 8.6–10.4)
CHLORIDE BLD-SCNC: 104 MMOL/L (ref 98–107)
CHOLESTEROL/HDL RATIO: 3.9
CHOLESTEROL: 157 MG/DL
CO2: 24 MMOL/L (ref 20–31)
CREAT SERPL-MCNC: 0.92 MG/DL (ref 0.7–1.2)
DIFFERENTIAL TYPE: ABNORMAL
EOSINOPHILS RELATIVE PERCENT: 1 % (ref 1–4)
GFR AFRICAN AMERICAN: >60 ML/MIN
GFR NON-AFRICAN AMERICAN: >60 ML/MIN
GFR SERPL CREATININE-BSD FRML MDRD: ABNORMAL ML/MIN/{1.73_M2}
GFR SERPL CREATININE-BSD FRML MDRD: ABNORMAL ML/MIN/{1.73_M2}
GLUCOSE BLD-MCNC: 127 MG/DL (ref 70–99)
HCT VFR BLD CALC: 49.9 % (ref 40.7–50.3)
HDLC SERPL-MCNC: 40 MG/DL
HEMOGLOBIN: 15.8 G/DL (ref 13–17)
IMMATURE GRANULOCYTES: 0 %
LDL CHOLESTEROL: 74 MG/DL (ref 0–130)
LYMPHOCYTES # BLD: 16 % (ref 24–43)
MCH RBC QN AUTO: 29.4 PG (ref 25.2–33.5)
MCHC RBC AUTO-ENTMCNC: 31.7 G/DL (ref 28.4–34.8)
MCV RBC AUTO: 92.8 FL (ref 82.6–102.9)
MONOCYTES # BLD: 6 % (ref 3–12)
NRBC AUTOMATED: 0 PER 100 WBC
PDW BLD-RTO: 13.1 % (ref 11.8–14.4)
PLATELET # BLD: 189 K/UL (ref 138–453)
PLATELET ESTIMATE: ABNORMAL
PMV BLD AUTO: 12 FL (ref 8.1–13.5)
POTASSIUM SERPL-SCNC: 4 MMOL/L (ref 3.7–5.3)
RBC # BLD: 5.38 M/UL (ref 4.21–5.77)
RBC # BLD: ABNORMAL 10*6/UL
SEG NEUTROPHILS: 76 % (ref 36–65)
SEGMENTED NEUTROPHILS ABSOLUTE COUNT: 8 K/UL (ref 1.5–8.1)
SODIUM BLD-SCNC: 140 MMOL/L (ref 135–144)
TOTAL PROTEIN: 6.7 G/DL (ref 6.4–8.3)
TRIGL SERPL-MCNC: 214 MG/DL
TSH SERPL DL<=0.05 MIU/L-ACNC: 3.15 MIU/L (ref 0.3–5)
VLDLC SERPL CALC-MCNC: ABNORMAL MG/DL (ref 1–30)
WBC # BLD: 10.6 K/UL (ref 3.5–11.3)
WBC # BLD: ABNORMAL 10*3/UL

## 2021-04-22 LAB — VITAMIN D 25-HYDROXY: <5 NG/ML (ref 30–100)

## 2021-06-14 NOTE — PROGRESS NOTES
Progress Notes by Kelvin Varela, PT at 1/7/2021 12:00 PM     Author: Kelvin Varela, PT Service: -- Author Type: Physical Therapist    Filed: 8/10/2021  3:30 PM Encounter Date: 1/7/2021 Status: Addendum    : Kelvin Varela, PT (Physical Therapist)    Related Notes: Original Note by Kelvin Varela, PT (Physical Therapist) filed at 1/7/2021  2:30 PM       [facility] Rehabilitation Discharge Summary  Patient Name: Raghavendra Kiser  Date: 8/10/2021  Referral Diagnosis: Acute CVA (cerebrovascular accident) (H)  Referring provider: Luis Daniel Maciel,*  Visit Diagnosis:   1. Right sided weakness     2. Gait instability         Goals:  No data recorded  No data recorded    Patient was seen for 1 visits ending on 1/7/21.  A trial of independent self management was initiated.  The patient did not return to continue any physical therapy.  Please see attached note for patient status.    Therapy will be discontinued at this time.     Thank you for your referral.  Kelvin Varela  8/10/2021  3:30 PM      [facility]   Balance Initial Evaluation    Patient Name: Raghavendra Kiser  Date of evaluation: 1/7/2021  Referral Diagnosis: Acute CVA (cerebrovascular accident) (H)  Referring provider: Luis Daniel Maciel,*  Visit Diagnosis:     ICD-10-CM    1. Right sided weakness  R53.1    2. Gait instability  R26.81        Precautions / Restrictions : h/o TIA, CVA, COVID 10/2020       Assessment:      Impairments in  strength, gait/locomotion and balance  Patient's signs and symptoms are consistent with recovery from CVA.  Patient responded well to HEP.  Prognosis to achieve goals is  good   Pt. is appropriate for skilled PT intervention as outlined in the Plan of Care (POC).  Based on falls assessment, patient is at an increased risk for falling. Plan of care will address this risk with lower extremity strengthening and balance training.    Goals:  Pt. will  This RN attempted to schedule patient's MRI brain today. Following treatment for head lice, MRI staff and infection control recommending waiting until at least tomorrow to complete MRI brain if possible. This RN clarified with Irving Chester CNP and Dr. Betty Guzman Neurologist, MRI okay to wait to be completed tomorrow morning (2/25/21). MRI screening form previously completed and placed in patient's chart. "demonstrate/verbalize independence in self-management of condition in : 4 weeks  Pt. will be independent with home exercise program in : 4 weeks    Pt will: increase FGA score to >24/30 for decrease risk for falls in 8 weeks  Pt will: be able to heel walk for increas ein functional strength and improved balance to decrease risk for fallsin in 8 weeks      Patient's expectations/goals are realistic.    Barriers to Learning or Achieving Goals:  No Barriers.       Plan / Patient Instructions:        Plan of Care:   Authorization / Certification Start Date: 21  Authorization / Certification End Date: 21  Authorization / Certification Number of Visits: 6  Communication with: Referral Source  Patient Related Instruction: Nature of Condition;Body mechanics;Posture;Treatment plan and rationale;Precautions;Next steps;Self Care instruction;Expected outcome;Basis of treatment  Times per Week: 1-2 as needed  Number of Weeks: 4  Number of Visits: 6  Discharge Planning: to include HEP  Precautions / Restrictions : h/o TIA, CVA, COVID 10/2020  Therapeutic Exercise: ROM;Stretching;Strengthening  Neuromuscular Reeducation: posture;balance/proprioception;core  Gait Training: as needed.      Pt. is in agreement with the Plan of Care  A Home Exercise Program (HEP) was initiated today.  Pt. was instructed in exercises by PT and patient was given a handout with detailed instructions.       Plan for next visit: follow up as needed.  If patient has not returned in 30 days, the episode of care will be closed.     Subjective:            History of Present Illness:    Raghavendra \"Mauri\" is a 63 y.o. male who presents to therapy today with complaints of right sided weakness and decreased activity tolerance s/p a second recent CVA on 2020. Symptoms are getting better. He reports a history of similar symptoms after a CVA this past fall. He describes their previous level of function as not limited    Pain Ratin      Functional " limitations are described as occurring with:   balance  lifting         Objective:      Note: Items left blank indicates the item was not performed or not indicated at the time of the evaluation.      Balance Examination  1. Right sided weakness     2. Gait instability         Involved side: Right  Posture Observation:      Cervical:  Mild forward head  Shoulder/Thoracic complex: Mild bilateral scapular protraction   Lumbopelvic complex: slight flexion        Fall history:None    TRANSITIONAL MOVEMENTS         Sit?Stand:  Independent     W/C?Mat/Bed: Not Tested  Sit? Supine:  Independent   Supine? Sit:   Independent   W/C? Car:  Not Tested    Floor? Stand:  Independent      STRENGTH AND ROM      Strength   L / R ROM   L / R Comments   Seated Hip Flexion 5/4+      Knee Extension 5/5      Dorsi Flexion 5/5                   Supine Straight Leg Raise                    Side lying Hip Abduction 4+/4+      Hip Adduction 4+/4+            Prone Hip Extension       Knee Flexion             Standing Plantar Flexion*       Dorsi Flexion        *Standing PF (single heel raise with UE support for balance only):  5= 16-20 heel raises  4= 10-15 heel raises  3= 5-9 heel raises  2= 1-4 heel raises    30 Second Chair Stand #13      Sensation: NT      Balance: FGA 28/30       Firm Surface (seconds) Unstable Surface (seconds)    EO EC EO EC   Romberg (feet together) 30+ 30+     Sharpened Romberg (tandem) 30+ r 3, l 7     Semi-Romberg (small step)              GAIT  Assistive Device: none        Gait Analysis: no LOB, some decreased right arm swing, but overall symmetrical.    STAIRS  # 4   Reciprocal pattern and Without rail        ACTIVITY TOLERANCE   6 minute walk test     1,216 feet  RPD 2-3/10          Treatment Today      TREATMENT MINUTES COMMENTS   Evaluation 45    Self-care/ Home management     Manual therapy     Neuromuscular Re-education     Therapeutic Activity     Therapeutic Exercises 10    Gait training      Modality__________________                Total 55    Blank areas are intentional and mean the treatment did not include these items.              Kelvin Varela, PT  1/7/2021  12:13 PM

## 2022-09-20 ENCOUNTER — HOSPITAL ENCOUNTER (OUTPATIENT)
Age: 67
Setting detail: SPECIMEN
Discharge: HOME | End: 2022-09-20

## 2022-09-20 LAB
ABSOLUTE EOS #: 0.2 K/UL (ref 0–0.44)
ABSOLUTE IMMATURE GRANULOCYTE: <0.03 K/UL (ref 0–0.3)
ABSOLUTE LYMPH #: 2.03 K/UL (ref 1.1–3.7)
ABSOLUTE MONO #: 0.71 K/UL (ref 0.1–1.2)
ALBUMIN SERPL-MCNC: 4.1 G/DL (ref 3.5–5.2)
ALBUMIN/GLOBULIN RATIO: 1.4 (ref 1–2.5)
ALP BLD-CCNC: 77 U/L (ref 40–129)
ALT SERPL-CCNC: 13 U/L (ref 5–41)
ANION GAP SERPL CALCULATED.3IONS-SCNC: 13 MMOL/L (ref 9–17)
AST SERPL-CCNC: 18 U/L
BASOPHILS # BLD: 1 % (ref 0–2)
BASOPHILS ABSOLUTE: 0.11 K/UL (ref 0–0.2)
BILIRUB SERPL-MCNC: 0.3 MG/DL (ref 0.3–1.2)
BUN BLDV-MCNC: 24 MG/DL (ref 8–23)
CALCIUM SERPL-MCNC: 9.3 MG/DL (ref 8.6–10.4)
CHLORIDE BLD-SCNC: 102 MMOL/L (ref 98–107)
CHOLESTEROL/HDL RATIO: 4.9
CHOLESTEROL: 158 MG/DL
CO2: 24 MMOL/L (ref 20–31)
CREAT SERPL-MCNC: 1.26 MG/DL (ref 0.7–1.2)
EOSINOPHILS RELATIVE PERCENT: 2 % (ref 1–4)
GFR AFRICAN AMERICAN: >60 ML/MIN
GFR NON-AFRICAN AMERICAN: 57 ML/MIN
GFR SERPL CREATININE-BSD FRML MDRD: ABNORMAL ML/MIN/{1.73_M2}
GLUCOSE BLD-MCNC: 144 MG/DL (ref 70–99)
HCT VFR BLD CALC: 44.7 % (ref 40.7–50.3)
HDLC SERPL-MCNC: 32 MG/DL
HEMOGLOBIN: 13.8 G/DL (ref 13–17)
IMMATURE GRANULOCYTES: 0 %
LDL CHOLESTEROL: 73 MG/DL (ref 0–130)
LYMPHOCYTES # BLD: 25 % (ref 24–43)
MCH RBC QN AUTO: 29.1 PG (ref 25.2–33.5)
MCHC RBC AUTO-ENTMCNC: 30.9 G/DL (ref 28.4–34.8)
MCV RBC AUTO: 94.1 FL (ref 82.6–102.9)
MONOCYTES # BLD: 9 % (ref 3–12)
NRBC AUTOMATED: 0 PER 100 WBC
PDW BLD-RTO: 12.7 % (ref 11.8–14.4)
PLATELET # BLD: 156 K/UL (ref 138–453)
PMV BLD AUTO: 12 FL (ref 8.1–13.5)
POTASSIUM SERPL-SCNC: 4.7 MMOL/L (ref 3.7–5.3)
RBC # BLD: 4.75 M/UL (ref 4.21–5.77)
SEG NEUTROPHILS: 63 % (ref 36–65)
SEGMENTED NEUTROPHILS ABSOLUTE COUNT: 5.14 K/UL (ref 1.5–8.1)
SODIUM BLD-SCNC: 139 MMOL/L (ref 135–144)
TOTAL PROTEIN: 7.1 G/DL (ref 6.4–8.3)
TRIGL SERPL-MCNC: 265 MG/DL
WBC # BLD: 8.2 K/UL (ref 3.5–11.3)

## 2023-03-08 ENCOUNTER — HOSPITAL ENCOUNTER (OUTPATIENT)
Age: 68
Setting detail: SPECIMEN
Discharge: HOME | End: 2023-03-08

## 2023-03-08 LAB
ABSOLUTE EOS #: 0.19 K/UL (ref 0–0.44)
ABSOLUTE IMMATURE GRANULOCYTE: <0.03 K/UL (ref 0–0.3)
ABSOLUTE LYMPH #: 1.84 K/UL (ref 1.1–3.7)
ABSOLUTE MONO #: 0.58 K/UL (ref 0.1–1.2)
ALBUMIN SERPL-MCNC: 3.8 G/DL (ref 3.5–5.2)
ALBUMIN/GLOBULIN RATIO: 1 (ref 1–2.5)
ALP SERPL-CCNC: 79 U/L (ref 40–129)
ALT SERPL-CCNC: 11 U/L (ref 5–41)
ANION GAP SERPL CALCULATED.3IONS-SCNC: 17 MMOL/L (ref 9–17)
AST SERPL-CCNC: 15 U/L
BASOPHILS # BLD: 1 % (ref 0–2)
BASOPHILS ABSOLUTE: 0.1 K/UL (ref 0–0.2)
BILIRUB SERPL-MCNC: 0.3 MG/DL (ref 0.3–1.2)
BUN SERPL-MCNC: 20 MG/DL (ref 8–23)
CALCIUM SERPL-MCNC: 9.2 MG/DL (ref 8.6–10.4)
CHLORIDE SERPL-SCNC: 106 MMOL/L (ref 98–107)
CHOLEST SERPL-MCNC: 126 MG/DL
CHOLESTEROL/HDL RATIO: 4.1
CO2 SERPL-SCNC: 21 MMOL/L (ref 20–31)
CREAT SERPL-MCNC: 1.18 MG/DL (ref 0.7–1.2)
EOSINOPHILS RELATIVE PERCENT: 2 % (ref 1–4)
GFR SERPL CREATININE-BSD FRML MDRD: >60 ML/MIN/1.73M2
GLUCOSE SERPL-MCNC: 139 MG/DL (ref 70–99)
HCT VFR BLD AUTO: 42.8 % (ref 40.7–50.3)
HDLC SERPL-MCNC: 31 MG/DL
HGB BLD-MCNC: 12.9 G/DL (ref 13–17)
IMMATURE GRANULOCYTES: 0 %
LDLC SERPL CALC-MCNC: 62 MG/DL (ref 0–130)
LYMPHOCYTES # BLD: 22 % (ref 24–43)
MCH RBC QN AUTO: 28 PG (ref 25.2–33.5)
MCHC RBC AUTO-ENTMCNC: 30.1 G/DL (ref 28.4–34.8)
MCV RBC AUTO: 92.8 FL (ref 82.6–102.9)
MONOCYTES # BLD: 7 % (ref 3–12)
NRBC AUTOMATED: 0 PER 100 WBC
PDW BLD-RTO: 14.5 % (ref 11.8–14.4)
PLATELET # BLD AUTO: 195 K/UL (ref 138–453)
PMV BLD AUTO: 11.3 FL (ref 8.1–13.5)
POTASSIUM SERPL-SCNC: 4.5 MMOL/L (ref 3.7–5.3)
PROT SERPL-MCNC: 7.5 G/DL (ref 6.4–8.3)
RBC # BLD: 4.61 M/UL (ref 4.21–5.77)
RBC # BLD: ABNORMAL 10*6/UL
SEG NEUTROPHILS: 68 % (ref 36–65)
SEGMENTED NEUTROPHILS ABSOLUTE COUNT: 5.54 K/UL (ref 1.5–8.1)
SODIUM SERPL-SCNC: 144 MMOL/L (ref 135–144)
TRIGL SERPL-MCNC: 167 MG/DL
WBC # BLD AUTO: 8.3 K/UL (ref 3.5–11.3)

## 2023-04-06 ENCOUNTER — HOSPITAL ENCOUNTER (INPATIENT)
Age: 68
LOS: 6 days | Discharge: NURSING, REHAB OR LONG-TERM CARE | DRG: 463 | End: 2023-04-14
Admitting: INTERNAL MEDICINE
Payer: COMMERCIAL

## 2023-04-06 DIAGNOSIS — N39.0 URINARY TRACT INFECTION WITHOUT HEMATURIA, SITE UNSPECIFIED: ICD-10-CM

## 2023-04-06 DIAGNOSIS — E66.2 OBESITY HYPOVENTILATION SYNDROME (HCC): ICD-10-CM

## 2023-04-06 DIAGNOSIS — G93.40 ENCEPHALOPATHY: ICD-10-CM

## 2023-04-06 DIAGNOSIS — G47.19 EXCESSIVE DAYTIME SLEEPINESS: ICD-10-CM

## 2023-04-06 DIAGNOSIS — R06.83 SNORING: ICD-10-CM

## 2023-04-06 DIAGNOSIS — G24.01 TARDIVE DYSKINESIA: ICD-10-CM

## 2023-04-06 DIAGNOSIS — E11.69 TYPE 2 DIABETES MELLITUS WITH OTHER SPECIFIED COMPLICATION, UNSPECIFIED WHETHER LONG TERM INSULIN USE (HCC): ICD-10-CM

## 2023-04-06 DIAGNOSIS — R26.81 GAIT INSTABILITY: Primary | ICD-10-CM

## 2023-04-06 DIAGNOSIS — L89.90 PRESSURE INJURY OF SKIN, UNSPECIFIED INJURY STAGE, UNSPECIFIED LOCATION: ICD-10-CM

## 2023-04-06 DIAGNOSIS — F51.9 SEVERE MORNING SLEEP INERTIA: ICD-10-CM

## 2023-04-06 DIAGNOSIS — R06.81 WITNESSED EPISODE OF APNEA: ICD-10-CM

## 2023-04-06 LAB
ANION GAP SERPL CALC-SCNC: 16 MEQ/L (ref 8–16)
BACTERIA URNS QL MICRO: ABNORMAL /HPF
BASOPHILS ABSOLUTE: 0.1 THOU/MM3 (ref 0–0.1)
BASOPHILS NFR BLD AUTO: 0.8 %
BILIRUB UR QL STRIP.AUTO: NEGATIVE
BUN SERPL-MCNC: 33 MG/DL (ref 7–22)
CALCIUM SERPL-MCNC: 9 MG/DL (ref 8.5–10.5)
CASTS #/AREA URNS LPF: ABNORMAL /LPF
CASTS 2: ABNORMAL /LPF
CHARACTER UR: ABNORMAL
CHLORIDE SERPL-SCNC: 101 MEQ/L (ref 98–111)
CO2 SERPL-SCNC: 19 MEQ/L (ref 23–33)
COLOR: YELLOW
CREAT SERPL-MCNC: 1.3 MG/DL (ref 0.4–1.2)
CRYSTALS URNS MICRO: ABNORMAL
DEPRECATED RDW RBC AUTO: 46.2 FL (ref 35–45)
EOSINOPHIL NFR BLD AUTO: 2 %
EOSINOPHILS ABSOLUTE: 0.2 THOU/MM3 (ref 0–0.4)
EPITHELIAL CELLS, UA: ABNORMAL /HPF
ERYTHROCYTE [DISTWIDTH] IN BLOOD BY AUTOMATED COUNT: 14 % (ref 11.5–14.5)
GFR SERPL CREATININE-BSD FRML MDRD: 60 ML/MIN/1.73M2
GLUCOSE BLD STRIP.AUTO-MCNC: 239 MG/DL (ref 70–108)
GLUCOSE BLD STRIP.AUTO-MCNC: 260 MG/DL (ref 70–108)
GLUCOSE SERPL-MCNC: 297 MG/DL (ref 70–108)
GLUCOSE UR QL STRIP.AUTO: >= 1000 MG/DL
HCT VFR BLD AUTO: 39.8 % (ref 42–52)
HGB BLD-MCNC: 12.5 GM/DL (ref 14–18)
HGB UR QL STRIP.AUTO: ABNORMAL
IMM GRANULOCYTES # BLD AUTO: 0.03 THOU/MM3 (ref 0–0.07)
IMM GRANULOCYTES NFR BLD AUTO: 0.4 %
KETONES UR QL STRIP.AUTO: NEGATIVE
LYMPHOCYTES ABSOLUTE: 1.5 THOU/MM3 (ref 1–4.8)
LYMPHOCYTES NFR BLD AUTO: 17.2 %
MAGNESIUM SERPL-MCNC: 1.8 MG/DL (ref 1.6–2.4)
MCH RBC QN AUTO: 28.3 PG (ref 26–33)
MCHC RBC AUTO-ENTMCNC: 31.4 GM/DL (ref 32.2–35.5)
MCV RBC AUTO: 90.2 FL (ref 80–94)
MISCELLANEOUS 2: ABNORMAL
MONOCYTES ABSOLUTE: 0.5 THOU/MM3 (ref 0.4–1.3)
MONOCYTES NFR BLD AUTO: 6.2 %
NEUTROPHILS NFR BLD AUTO: 73.4 %
NITRITE UR QL STRIP: POSITIVE
NRBC BLD AUTO-RTO: 0 /100 WBC
OSMOLALITY SERPL CALC.SUM OF ELEC: 290.2 MOSMOL/KG (ref 275–300)
PH UR STRIP.AUTO: 5.5 [PH] (ref 5–9)
PLATELET # BLD AUTO: 184 THOU/MM3 (ref 130–400)
PMV BLD AUTO: 11.1 FL (ref 9.4–12.4)
POTASSIUM SERPL-SCNC: 4 MEQ/L (ref 3.5–5.2)
PROT UR STRIP.AUTO-MCNC: 100 MG/DL
RBC # BLD AUTO: 4.41 MILL/MM3 (ref 4.7–6.1)
RBC URINE: ABNORMAL /HPF
RENAL EPI CELLS #/AREA URNS HPF: ABNORMAL /[HPF]
SEGMENTED NEUTROPHILS ABSOLUTE COUNT: 6.2 THOU/MM3 (ref 1.8–7.7)
SODIUM SERPL-SCNC: 136 MEQ/L (ref 135–145)
SP GR UR REFRACT.AUTO: 1.02 (ref 1–1.03)
UROBILINOGEN, URINE: 1 EU/DL (ref 0–1)
WBC # BLD AUTO: 8.5 THOU/MM3 (ref 4.8–10.8)
WBC #/AREA URNS HPF: > 200 /HPF
WBC #/AREA URNS HPF: ABNORMAL /[HPF]
YEAST LIKE FUNGI URNS QL MICRO: ABNORMAL

## 2023-04-06 PROCEDURE — 82948 REAGENT STRIP/BLOOD GLUCOSE: CPT

## 2023-04-06 PROCEDURE — 80048 BASIC METABOLIC PNL TOTAL CA: CPT

## 2023-04-06 PROCEDURE — 6370000000 HC RX 637 (ALT 250 FOR IP)

## 2023-04-06 PROCEDURE — 96372 THER/PROPH/DIAG INJ SC/IM: CPT

## 2023-04-06 PROCEDURE — 83735 ASSAY OF MAGNESIUM: CPT

## 2023-04-06 PROCEDURE — 99285 EMERGENCY DEPT VISIT HI MDM: CPT

## 2023-04-06 PROCEDURE — 87086 URINE CULTURE/COLONY COUNT: CPT

## 2023-04-06 PROCEDURE — 85025 COMPLETE CBC W/AUTO DIFF WBC: CPT

## 2023-04-06 PROCEDURE — 99223 1ST HOSP IP/OBS HIGH 75: CPT

## 2023-04-06 PROCEDURE — 6360000002 HC RX W HCPCS

## 2023-04-06 PROCEDURE — 87077 CULTURE AEROBIC IDENTIFY: CPT

## 2023-04-06 PROCEDURE — 87186 SC STD MICRODIL/AGAR DIL: CPT

## 2023-04-06 PROCEDURE — 36415 COLL VENOUS BLD VENIPUNCTURE: CPT

## 2023-04-06 PROCEDURE — G0378 HOSPITAL OBSERVATION PER HR: HCPCS

## 2023-04-06 PROCEDURE — 81001 URINALYSIS AUTO W/SCOPE: CPT

## 2023-04-06 PROCEDURE — 2580000003 HC RX 258

## 2023-04-06 RX ORDER — FERROUS SULFATE 325(65) MG
325 TABLET ORAL 2 TIMES DAILY WITH MEALS
Status: DISCONTINUED | OUTPATIENT
Start: 2023-04-06 | End: 2023-04-14 | Stop reason: HOSPADM

## 2023-04-06 RX ORDER — AMLODIPINE BESYLATE 10 MG/1
10 TABLET ORAL DAILY
Status: DISCONTINUED | OUTPATIENT
Start: 2023-04-06 | End: 2023-04-14 | Stop reason: HOSPADM

## 2023-04-06 RX ORDER — SODIUM CHLORIDE 0.9 % (FLUSH) 0.9 %
10 SYRINGE (ML) INJECTION PRN
Status: DISCONTINUED | OUTPATIENT
Start: 2023-04-06 | End: 2023-04-14 | Stop reason: HOSPADM

## 2023-04-06 RX ORDER — GABAPENTIN 300 MG/1
300 CAPSULE ORAL 3 TIMES DAILY
Status: DISCONTINUED | OUTPATIENT
Start: 2023-04-06 | End: 2023-04-14 | Stop reason: HOSPADM

## 2023-04-06 RX ORDER — LISINOPRIL 20 MG/1
20 TABLET ORAL DAILY
Status: DISCONTINUED | OUTPATIENT
Start: 2023-04-06 | End: 2023-04-10

## 2023-04-06 RX ORDER — SODIUM CHLORIDE 9 MG/ML
INJECTION, SOLUTION INTRAVENOUS PRN
Status: DISCONTINUED | OUTPATIENT
Start: 2023-04-06 | End: 2023-04-14 | Stop reason: HOSPADM

## 2023-04-06 RX ORDER — ONDANSETRON 4 MG/1
4 TABLET, ORALLY DISINTEGRATING ORAL EVERY 8 HOURS PRN
Status: DISCONTINUED | OUTPATIENT
Start: 2023-04-06 | End: 2023-04-14 | Stop reason: HOSPADM

## 2023-04-06 RX ORDER — ACETAMINOPHEN 325 MG/1
650 TABLET ORAL EVERY 6 HOURS PRN
Status: DISCONTINUED | OUTPATIENT
Start: 2023-04-06 | End: 2023-04-14 | Stop reason: HOSPADM

## 2023-04-06 RX ORDER — ASPIRIN 81 MG/1
81 TABLET ORAL DAILY
Status: DISCONTINUED | OUTPATIENT
Start: 2023-04-07 | End: 2023-04-14 | Stop reason: HOSPADM

## 2023-04-06 RX ORDER — ACETAMINOPHEN 650 MG/1
650 SUPPOSITORY RECTAL EVERY 6 HOURS PRN
Status: DISCONTINUED | OUTPATIENT
Start: 2023-04-06 | End: 2023-04-14 | Stop reason: HOSPADM

## 2023-04-06 RX ORDER — INSULIN LISPRO 100 [IU]/ML
0-16 INJECTION, SOLUTION INTRAVENOUS; SUBCUTANEOUS
Status: DISCONTINUED | OUTPATIENT
Start: 2023-04-07 | End: 2023-04-13

## 2023-04-06 RX ORDER — ENOXAPARIN SODIUM 100 MG/ML
40 INJECTION SUBCUTANEOUS 2 TIMES DAILY
Status: DISCONTINUED | OUTPATIENT
Start: 2023-04-06 | End: 2023-04-14 | Stop reason: HOSPADM

## 2023-04-06 RX ORDER — INSULIN LISPRO 100 [IU]/ML
0-4 INJECTION, SOLUTION INTRAVENOUS; SUBCUTANEOUS NIGHTLY
Status: DISCONTINUED | OUTPATIENT
Start: 2023-04-06 | End: 2023-04-13

## 2023-04-06 RX ORDER — POLYETHYLENE GLYCOL 3350 17 G/17G
17 POWDER, FOR SOLUTION ORAL DAILY PRN
Status: DISCONTINUED | OUTPATIENT
Start: 2023-04-06 | End: 2023-04-14 | Stop reason: HOSPADM

## 2023-04-06 RX ORDER — INSULIN GLARGINE 100 [IU]/ML
30 INJECTION, SOLUTION SUBCUTANEOUS 2 TIMES DAILY
Status: DISCONTINUED | OUTPATIENT
Start: 2023-04-06 | End: 2023-04-14 | Stop reason: HOSPADM

## 2023-04-06 RX ORDER — INSULIN LISPRO 100 [IU]/ML
0-4 INJECTION, SOLUTION INTRAVENOUS; SUBCUTANEOUS NIGHTLY
Status: DISCONTINUED | OUTPATIENT
Start: 2023-04-06 | End: 2023-04-06

## 2023-04-06 RX ORDER — POTASSIUM CHLORIDE 20 MEQ/1
40 TABLET, EXTENDED RELEASE ORAL PRN
Status: DISCONTINUED | OUTPATIENT
Start: 2023-04-06 | End: 2023-04-14 | Stop reason: HOSPADM

## 2023-04-06 RX ORDER — DEXTROSE MONOHYDRATE 100 MG/ML
INJECTION, SOLUTION INTRAVENOUS CONTINUOUS PRN
Status: DISCONTINUED | OUTPATIENT
Start: 2023-04-06 | End: 2023-04-14 | Stop reason: HOSPADM

## 2023-04-06 RX ORDER — PANTOPRAZOLE SODIUM 40 MG/1
40 TABLET, DELAYED RELEASE ORAL
Status: DISCONTINUED | OUTPATIENT
Start: 2023-04-07 | End: 2023-04-14 | Stop reason: HOSPADM

## 2023-04-06 RX ORDER — SODIUM CHLORIDE 9 MG/ML
INJECTION, SOLUTION INTRAVENOUS CONTINUOUS
Status: DISCONTINUED | OUTPATIENT
Start: 2023-04-06 | End: 2023-04-07

## 2023-04-06 RX ORDER — ONDANSETRON 2 MG/ML
4 INJECTION INTRAMUSCULAR; INTRAVENOUS EVERY 6 HOURS PRN
Status: DISCONTINUED | OUTPATIENT
Start: 2023-04-06 | End: 2023-04-14 | Stop reason: HOSPADM

## 2023-04-06 RX ORDER — LEVETIRACETAM 500 MG/1
500 TABLET ORAL 2 TIMES DAILY
Status: DISCONTINUED | OUTPATIENT
Start: 2023-04-06 | End: 2023-04-14 | Stop reason: HOSPADM

## 2023-04-06 RX ORDER — SODIUM CHLORIDE 0.9 % (FLUSH) 0.9 %
10 SYRINGE (ML) INJECTION EVERY 12 HOURS SCHEDULED
Status: DISCONTINUED | OUTPATIENT
Start: 2023-04-06 | End: 2023-04-14 | Stop reason: HOSPADM

## 2023-04-06 RX ORDER — POTASSIUM CHLORIDE 7.45 MG/ML
10 INJECTION INTRAVENOUS PRN
Status: DISCONTINUED | OUTPATIENT
Start: 2023-04-06 | End: 2023-04-14 | Stop reason: HOSPADM

## 2023-04-06 RX ORDER — MAGNESIUM SULFATE IN WATER 40 MG/ML
2000 INJECTION, SOLUTION INTRAVENOUS PRN
Status: DISCONTINUED | OUTPATIENT
Start: 2023-04-06 | End: 2023-04-14 | Stop reason: HOSPADM

## 2023-04-06 RX ORDER — HYDROXYZINE PAMOATE 25 MG/1
25 CAPSULE ORAL NIGHTLY PRN
Status: DISCONTINUED | OUTPATIENT
Start: 2023-04-06 | End: 2023-04-14 | Stop reason: HOSPADM

## 2023-04-06 RX ORDER — HYDRALAZINE HYDROCHLORIDE 25 MG/1
25 TABLET, FILM COATED ORAL EVERY 8 HOURS SCHEDULED
Status: DISCONTINUED | OUTPATIENT
Start: 2023-04-06 | End: 2023-04-09

## 2023-04-06 RX ORDER — INSULIN LISPRO 100 [IU]/ML
0-8 INJECTION, SOLUTION INTRAVENOUS; SUBCUTANEOUS
Status: DISCONTINUED | OUTPATIENT
Start: 2023-04-06 | End: 2023-04-06

## 2023-04-06 RX ORDER — ATORVASTATIN CALCIUM 80 MG/1
80 TABLET, FILM COATED ORAL NIGHTLY
Status: DISCONTINUED | OUTPATIENT
Start: 2023-04-06 | End: 2023-04-14 | Stop reason: HOSPADM

## 2023-04-06 RX ADMIN — GABAPENTIN 300 MG: 300 CAPSULE ORAL at 20:42

## 2023-04-06 RX ADMIN — ATORVASTATIN CALCIUM 80 MG: 80 TABLET, FILM COATED ORAL at 22:54

## 2023-04-06 RX ADMIN — SODIUM CHLORIDE, PRESERVATIVE FREE 10 ML: 5 INJECTION INTRAVENOUS at 20:42

## 2023-04-06 RX ADMIN — LEVETIRACETAM 500 MG: 500 TABLET, FILM COATED ORAL at 22:54

## 2023-04-06 RX ADMIN — METOPROLOL TARTRATE 25 MG: 25 TABLET, FILM COATED ORAL at 20:42

## 2023-04-06 RX ADMIN — ENOXAPARIN SODIUM 40 MG: 100 INJECTION SUBCUTANEOUS at 20:42

## 2023-04-06 RX ADMIN — INSULIN GLARGINE 30 UNITS: 100 INJECTION, SOLUTION SUBCUTANEOUS at 22:54

## 2023-04-06 RX ADMIN — AMLODIPINE BESYLATE 10 MG: 10 TABLET ORAL at 20:42

## 2023-04-06 RX ADMIN — INSULIN LISPRO 2 UNITS: 100 INJECTION, SOLUTION INTRAVENOUS; SUBCUTANEOUS at 19:40

## 2023-04-06 RX ADMIN — FERROUS SULFATE TAB 325 MG (65 MG ELEMENTAL FE) 325 MG: 325 (65 FE) TAB at 22:54

## 2023-04-06 ASSESSMENT — ENCOUNTER SYMPTOMS
NAUSEA: 0
DIARRHEA: 0
RHINORRHEA: 0
BACK PAIN: 0
SORE THROAT: 0
CHEST TIGHTNESS: 0
WHEEZING: 0
EYE DISCHARGE: 0
COUGH: 0
CONSTIPATION: 0
VOMITING: 0
ABDOMINAL PAIN: 0
EYE PAIN: 0
SHORTNESS OF BREATH: 0

## 2023-04-06 ASSESSMENT — PAIN - FUNCTIONAL ASSESSMENT
PAIN_FUNCTIONAL_ASSESSMENT: NONE - DENIES PAIN

## 2023-04-07 LAB
ANION GAP SERPL CALC-SCNC: 11 MEQ/L (ref 8–16)
BASOPHILS ABSOLUTE: 0.1 THOU/MM3 (ref 0–0.1)
BASOPHILS NFR BLD AUTO: 1 %
BUN SERPL-MCNC: 29 MG/DL (ref 7–22)
CALCIUM SERPL-MCNC: 8.6 MG/DL (ref 8.5–10.5)
CHLORIDE SERPL-SCNC: 107 MEQ/L (ref 98–111)
CO2 SERPL-SCNC: 22 MEQ/L (ref 23–33)
CREAT SERPL-MCNC: 1.2 MG/DL (ref 0.4–1.2)
DEPRECATED MEAN GLUCOSE BLD GHB EST-ACNC: 174 MG/DL (ref 70–126)
DEPRECATED RDW RBC AUTO: 47.1 FL (ref 35–45)
EOSINOPHIL NFR BLD AUTO: 4.1 %
EOSINOPHILS ABSOLUTE: 0.3 THOU/MM3 (ref 0–0.4)
ERYTHROCYTE [DISTWIDTH] IN BLOOD BY AUTOMATED COUNT: 14 % (ref 11.5–14.5)
GFR SERPL CREATININE-BSD FRML MDRD: > 60 ML/MIN/1.73M2
GLUCOSE BLD STRIP.AUTO-MCNC: 176 MG/DL (ref 70–108)
GLUCOSE BLD STRIP.AUTO-MCNC: 184 MG/DL (ref 70–108)
GLUCOSE BLD STRIP.AUTO-MCNC: 204 MG/DL (ref 70–108)
GLUCOSE BLD STRIP.AUTO-MCNC: 267 MG/DL (ref 70–108)
GLUCOSE SERPL-MCNC: 207 MG/DL (ref 70–108)
HBA1C MFR BLD HPLC: 7.8 % (ref 4.4–6.4)
HCT VFR BLD AUTO: 35.3 % (ref 42–52)
HGB BLD-MCNC: 11 GM/DL (ref 14–18)
IMM GRANULOCYTES # BLD AUTO: 0.02 THOU/MM3 (ref 0–0.07)
IMM GRANULOCYTES NFR BLD AUTO: 0.3 %
LV EF: 54 %
LVEF MODALITY: NORMAL
LYMPHOCYTES ABSOLUTE: 1.5 THOU/MM3 (ref 1–4.8)
LYMPHOCYTES NFR BLD AUTO: 21.4 %
MAGNESIUM SERPL-MCNC: 1.8 MG/DL (ref 1.6–2.4)
MCH RBC QN AUTO: 28.5 PG (ref 26–33)
MCHC RBC AUTO-ENTMCNC: 31.2 GM/DL (ref 32.2–35.5)
MCV RBC AUTO: 91.5 FL (ref 80–94)
MONOCYTES ABSOLUTE: 0.6 THOU/MM3 (ref 0.4–1.3)
MONOCYTES NFR BLD AUTO: 8.7 %
MRSA DNA SPEC QL NAA+PROBE: NEGATIVE
NEUTROPHILS NFR BLD AUTO: 64.5 %
NRBC BLD AUTO-RTO: 0 /100 WBC
PLATELET # BLD AUTO: 165 THOU/MM3 (ref 130–400)
PMV BLD AUTO: 11.2 FL (ref 9.4–12.4)
POTASSIUM SERPL-SCNC: 4.3 MEQ/L (ref 3.5–5.2)
RBC # BLD AUTO: 3.86 MILL/MM3 (ref 4.7–6.1)
SEGMENTED NEUTROPHILS ABSOLUTE COUNT: 4.6 THOU/MM3 (ref 1.8–7.7)
SODIUM SERPL-SCNC: 140 MEQ/L (ref 135–145)
VANA ISLT/SPM QL: NEGATIVE
WBC # BLD AUTO: 7.2 THOU/MM3 (ref 4.8–10.8)

## 2023-04-07 PROCEDURE — 80048 BASIC METABOLIC PNL TOTAL CA: CPT

## 2023-04-07 PROCEDURE — G0378 HOSPITAL OBSERVATION PER HR: HCPCS

## 2023-04-07 PROCEDURE — 99232 SBSQ HOSP IP/OBS MODERATE 35: CPT | Performed by: INTERNAL MEDICINE

## 2023-04-07 PROCEDURE — 6370000000 HC RX 637 (ALT 250 FOR IP): Performed by: PHYSICIAN ASSISTANT

## 2023-04-07 PROCEDURE — 85025 COMPLETE CBC W/AUTO DIFF WBC: CPT

## 2023-04-07 PROCEDURE — 36415 COLL VENOUS BLD VENIPUNCTURE: CPT

## 2023-04-07 PROCEDURE — 96366 THER/PROPH/DIAG IV INF ADDON: CPT

## 2023-04-07 PROCEDURE — 83036 HEMOGLOBIN GLYCOSYLATED A1C: CPT

## 2023-04-07 PROCEDURE — 96367 TX/PROPH/DG ADDL SEQ IV INF: CPT

## 2023-04-07 PROCEDURE — 97530 THERAPEUTIC ACTIVITIES: CPT

## 2023-04-07 PROCEDURE — 2580000003 HC RX 258

## 2023-04-07 PROCEDURE — 6360000002 HC RX W HCPCS

## 2023-04-07 PROCEDURE — 96372 THER/PROPH/DIAG INJ SC/IM: CPT

## 2023-04-07 PROCEDURE — 87641 MR-STAPH DNA AMP PROBE: CPT

## 2023-04-07 PROCEDURE — 96365 THER/PROPH/DIAG IV INF INIT: CPT

## 2023-04-07 PROCEDURE — 97166 OT EVAL MOD COMPLEX 45 MIN: CPT

## 2023-04-07 PROCEDURE — 2500000003 HC RX 250 WO HCPCS

## 2023-04-07 PROCEDURE — 96361 HYDRATE IV INFUSION ADD-ON: CPT

## 2023-04-07 PROCEDURE — 97162 PT EVAL MOD COMPLEX 30 MIN: CPT

## 2023-04-07 PROCEDURE — 82948 REAGENT STRIP/BLOOD GLUCOSE: CPT

## 2023-04-07 PROCEDURE — 6370000000 HC RX 637 (ALT 250 FOR IP)

## 2023-04-07 PROCEDURE — 83735 ASSAY OF MAGNESIUM: CPT

## 2023-04-07 PROCEDURE — 93306 TTE W/DOPPLER COMPLETE: CPT

## 2023-04-07 PROCEDURE — 87500 VANOMYCIN DNA AMP PROBE: CPT

## 2023-04-07 RX ORDER — MAGNESIUM SULFATE IN WATER 40 MG/ML
2000 INJECTION, SOLUTION INTRAVENOUS ONCE
Status: COMPLETED | OUTPATIENT
Start: 2023-04-07 | End: 2023-04-07

## 2023-04-07 RX ADMIN — INSULIN GLARGINE 30 UNITS: 100 INJECTION, SOLUTION SUBCUTANEOUS at 21:55

## 2023-04-07 RX ADMIN — MICONAZOLE NITRATE: 20 POWDER TOPICAL at 11:20

## 2023-04-07 RX ADMIN — AMLODIPINE BESYLATE 10 MG: 10 TABLET ORAL at 08:53

## 2023-04-07 RX ADMIN — SODIUM CHLORIDE, PRESERVATIVE FREE 10 ML: 5 INJECTION INTRAVENOUS at 20:42

## 2023-04-07 RX ADMIN — ASPIRIN 81 MG: 81 TABLET, COATED ORAL at 08:52

## 2023-04-07 RX ADMIN — SODIUM CHLORIDE: 9 INJECTION, SOLUTION INTRAVENOUS at 00:06

## 2023-04-07 RX ADMIN — PANTOPRAZOLE SODIUM 40 MG: 40 TABLET, DELAYED RELEASE ORAL at 05:16

## 2023-04-07 RX ADMIN — INSULIN LISPRO 4 UNITS: 100 INJECTION, SOLUTION INTRAVENOUS; SUBCUTANEOUS at 16:46

## 2023-04-07 RX ADMIN — SERTRALINE 50 MG: 50 TABLET, FILM COATED ORAL at 08:52

## 2023-04-07 RX ADMIN — ENOXAPARIN SODIUM 40 MG: 100 INJECTION SUBCUTANEOUS at 08:54

## 2023-04-07 RX ADMIN — FERROUS SULFATE TAB 325 MG (65 MG ELEMENTAL FE) 325 MG: 325 (65 FE) TAB at 08:53

## 2023-04-07 RX ADMIN — LEVETIRACETAM 500 MG: 500 TABLET, FILM COATED ORAL at 20:42

## 2023-04-07 RX ADMIN — METOPROLOL TARTRATE 25 MG: 25 TABLET, FILM COATED ORAL at 20:42

## 2023-04-07 RX ADMIN — CEFTRIAXONE SODIUM 1000 MG: 1 INJECTION, POWDER, FOR SOLUTION INTRAMUSCULAR; INTRAVENOUS at 00:10

## 2023-04-07 RX ADMIN — ENOXAPARIN SODIUM 40 MG: 100 INJECTION SUBCUTANEOUS at 20:42

## 2023-04-07 RX ADMIN — GABAPENTIN 300 MG: 300 CAPSULE ORAL at 08:52

## 2023-04-07 RX ADMIN — GABAPENTIN 300 MG: 300 CAPSULE ORAL at 14:18

## 2023-04-07 RX ADMIN — MICONAZOLE NITRATE: 20 POWDER TOPICAL at 20:42

## 2023-04-07 RX ADMIN — GABAPENTIN 300 MG: 300 CAPSULE ORAL at 20:42

## 2023-04-07 RX ADMIN — ATORVASTATIN CALCIUM 80 MG: 80 TABLET, FILM COATED ORAL at 20:42

## 2023-04-07 RX ADMIN — LEVETIRACETAM 500 MG: 500 TABLET, FILM COATED ORAL at 08:53

## 2023-04-07 RX ADMIN — INSULIN GLARGINE 30 UNITS: 100 INJECTION, SOLUTION SUBCUTANEOUS at 08:57

## 2023-04-07 RX ADMIN — INSULIN LISPRO 8 UNITS: 100 INJECTION, SOLUTION INTRAVENOUS; SUBCUTANEOUS at 12:11

## 2023-04-07 RX ADMIN — HYDRALAZINE HYDROCHLORIDE 25 MG: 25 TABLET, FILM COATED ORAL at 05:16

## 2023-04-07 RX ADMIN — METOPROLOL TARTRATE 25 MG: 25 TABLET, FILM COATED ORAL at 08:52

## 2023-04-07 RX ADMIN — HYDRALAZINE HYDROCHLORIDE 25 MG: 25 TABLET, FILM COATED ORAL at 21:59

## 2023-04-07 RX ADMIN — HYDRALAZINE HYDROCHLORIDE 25 MG: 25 TABLET, FILM COATED ORAL at 14:14

## 2023-04-07 RX ADMIN — SODIUM CHLORIDE: 9 INJECTION, SOLUTION INTRAVENOUS at 06:58

## 2023-04-07 RX ADMIN — FERROUS SULFATE TAB 325 MG (65 MG ELEMENTAL FE) 325 MG: 325 (65 FE) TAB at 16:46

## 2023-04-07 RX ADMIN — MAGNESIUM SULFATE HEPTAHYDRATE 2000 MG: 40 INJECTION, SOLUTION INTRAVENOUS at 11:20

## 2023-04-08 PROBLEM — N39.0 URINARY TRACT INFECTION: Status: ACTIVE | Noted: 2023-04-08

## 2023-04-08 PROBLEM — R26.81 GAIT INSTABILITY: Status: ACTIVE | Noted: 2023-04-08

## 2023-04-08 LAB
ANION GAP SERPL CALC-SCNC: 10 MEQ/L (ref 8–16)
BASOPHILS ABSOLUTE: 0.1 THOU/MM3 (ref 0–0.1)
BASOPHILS NFR BLD AUTO: 0.8 %
BUN SERPL-MCNC: 24 MG/DL (ref 7–22)
CALCIUM SERPL-MCNC: 8.6 MG/DL (ref 8.5–10.5)
CHLORIDE SERPL-SCNC: 107 MEQ/L (ref 98–111)
CO2 SERPL-SCNC: 22 MEQ/L (ref 23–33)
CREAT SERPL-MCNC: 1 MG/DL (ref 0.4–1.2)
DEPRECATED RDW RBC AUTO: 47.2 FL (ref 35–45)
EOSINOPHIL NFR BLD AUTO: 4.7 %
EOSINOPHILS ABSOLUTE: 0.3 THOU/MM3 (ref 0–0.4)
ERYTHROCYTE [DISTWIDTH] IN BLOOD BY AUTOMATED COUNT: 13.9 % (ref 11.5–14.5)
FOLATE SERPL-MCNC: 5.1 NG/ML (ref 4.8–24.2)
GFR SERPL CREATININE-BSD FRML MDRD: > 60 ML/MIN/1.73M2
GLUCOSE BLD STRIP.AUTO-MCNC: 141 MG/DL (ref 70–108)
GLUCOSE BLD STRIP.AUTO-MCNC: 205 MG/DL (ref 70–108)
GLUCOSE BLD STRIP.AUTO-MCNC: 245 MG/DL (ref 70–108)
GLUCOSE BLD STRIP.AUTO-MCNC: 249 MG/DL (ref 70–108)
GLUCOSE SERPL-MCNC: 146 MG/DL (ref 70–108)
HCT VFR BLD AUTO: 34.9 % (ref 42–52)
HGB BLD-MCNC: 10.7 GM/DL (ref 14–18)
HGB RETIC QN AUTO: 28.7 PG (ref 28.2–35.7)
IMM GRANULOCYTES # BLD AUTO: 0.02 THOU/MM3 (ref 0–0.07)
IMM GRANULOCYTES NFR BLD AUTO: 0.3 %
IMM RETICS NFR: 16.6 % (ref 2.3–13.4)
IRON SATN MFR SERPL: 38 % (ref 20–50)
IRON SERPL-MCNC: 66 UG/DL (ref 65–195)
LYMPHOCYTES ABSOLUTE: 1.9 THOU/MM3 (ref 1–4.8)
LYMPHOCYTES NFR BLD AUTO: 28.6 %
MAGNESIUM SERPL-MCNC: 2.2 MG/DL (ref 1.6–2.4)
MCH RBC QN AUTO: 28.4 PG (ref 26–33)
MCHC RBC AUTO-ENTMCNC: 30.7 GM/DL (ref 32.2–35.5)
MCV RBC AUTO: 92.6 FL (ref 80–94)
MONOCYTES ABSOLUTE: 0.5 THOU/MM3 (ref 0.4–1.3)
MONOCYTES NFR BLD AUTO: 7.8 %
NEUTROPHILS NFR BLD AUTO: 57.8 %
NRBC BLD AUTO-RTO: 0 /100 WBC
PHOSPHATE SERPL-MCNC: 3 MG/DL (ref 2.4–4.7)
PLATELET # BLD AUTO: 161 THOU/MM3 (ref 130–400)
PMV BLD AUTO: 11.3 FL (ref 9.4–12.4)
POTASSIUM SERPL-SCNC: 4.1 MEQ/L (ref 3.5–5.2)
RBC # BLD AUTO: 3.77 MILL/MM3 (ref 4.7–6.1)
RETICS # AUTO: 68 THOU/MM3 (ref 20–115)
RETICS/RBC NFR AUTO: 1.8 % (ref 0.5–2)
SEGMENTED NEUTROPHILS ABSOLUTE COUNT: 3.8 THOU/MM3 (ref 1.8–7.7)
SODIUM SERPL-SCNC: 139 MEQ/L (ref 135–145)
TIBC SERPL-MCNC: 173 UG/DL (ref 171–450)
VIT B12 SERPL-MCNC: 497 PG/ML (ref 211–911)
WBC # BLD AUTO: 6.5 THOU/MM3 (ref 4.8–10.8)

## 2023-04-08 PROCEDURE — 99232 SBSQ HOSP IP/OBS MODERATE 35: CPT | Performed by: INTERNAL MEDICINE

## 2023-04-08 PROCEDURE — 83735 ASSAY OF MAGNESIUM: CPT

## 2023-04-08 PROCEDURE — 80048 BASIC METABOLIC PNL TOTAL CA: CPT

## 2023-04-08 PROCEDURE — 96372 THER/PROPH/DIAG INJ SC/IM: CPT

## 2023-04-08 PROCEDURE — 83550 IRON BINDING TEST: CPT

## 2023-04-08 PROCEDURE — 96366 THER/PROPH/DIAG IV INF ADDON: CPT

## 2023-04-08 PROCEDURE — 6370000000 HC RX 637 (ALT 250 FOR IP)

## 2023-04-08 PROCEDURE — 82948 REAGENT STRIP/BLOOD GLUCOSE: CPT

## 2023-04-08 PROCEDURE — 6360000002 HC RX W HCPCS

## 2023-04-08 PROCEDURE — 2580000003 HC RX 258

## 2023-04-08 PROCEDURE — 83540 ASSAY OF IRON: CPT

## 2023-04-08 PROCEDURE — 6370000000 HC RX 637 (ALT 250 FOR IP): Performed by: PHYSICIAN ASSISTANT

## 2023-04-08 PROCEDURE — 1200000000 HC SEMI PRIVATE

## 2023-04-08 PROCEDURE — 36415 COLL VENOUS BLD VENIPUNCTURE: CPT

## 2023-04-08 PROCEDURE — 82746 ASSAY OF FOLIC ACID SERUM: CPT

## 2023-04-08 PROCEDURE — 85025 COMPLETE CBC W/AUTO DIFF WBC: CPT

## 2023-04-08 PROCEDURE — 84100 ASSAY OF PHOSPHORUS: CPT

## 2023-04-08 PROCEDURE — 82607 VITAMIN B-12: CPT

## 2023-04-08 PROCEDURE — 85046 RETICYTE/HGB CONCENTRATE: CPT

## 2023-04-08 RX ORDER — INSULIN LISPRO 100 [IU]/ML
5 INJECTION, SOLUTION INTRAVENOUS; SUBCUTANEOUS
Status: DISCONTINUED | OUTPATIENT
Start: 2023-04-09 | End: 2023-04-10

## 2023-04-08 RX ADMIN — LISINOPRIL 20 MG: 20 TABLET ORAL at 09:23

## 2023-04-08 RX ADMIN — INSULIN GLARGINE 30 UNITS: 100 INJECTION, SOLUTION SUBCUTANEOUS at 10:26

## 2023-04-08 RX ADMIN — GABAPENTIN 300 MG: 300 CAPSULE ORAL at 20:47

## 2023-04-08 RX ADMIN — PANTOPRAZOLE SODIUM 40 MG: 40 TABLET, DELAYED RELEASE ORAL at 06:01

## 2023-04-08 RX ADMIN — HYDRALAZINE HYDROCHLORIDE 25 MG: 25 TABLET, FILM COATED ORAL at 20:47

## 2023-04-08 RX ADMIN — INSULIN GLARGINE 30 UNITS: 100 INJECTION, SOLUTION SUBCUTANEOUS at 20:51

## 2023-04-08 RX ADMIN — SERTRALINE 50 MG: 50 TABLET, FILM COATED ORAL at 09:22

## 2023-04-08 RX ADMIN — ATORVASTATIN CALCIUM 80 MG: 80 TABLET, FILM COATED ORAL at 20:47

## 2023-04-08 RX ADMIN — SODIUM CHLORIDE, PRESERVATIVE FREE 10 ML: 5 INJECTION INTRAVENOUS at 09:24

## 2023-04-08 RX ADMIN — AMLODIPINE BESYLATE 10 MG: 10 TABLET ORAL at 09:23

## 2023-04-08 RX ADMIN — LEVETIRACETAM 500 MG: 500 TABLET, FILM COATED ORAL at 09:23

## 2023-04-08 RX ADMIN — INSULIN LISPRO 4 UNITS: 100 INJECTION, SOLUTION INTRAVENOUS; SUBCUTANEOUS at 12:32

## 2023-04-08 RX ADMIN — CEFTRIAXONE SODIUM 1000 MG: 1 INJECTION, POWDER, FOR SOLUTION INTRAMUSCULAR; INTRAVENOUS at 01:10

## 2023-04-08 RX ADMIN — SODIUM CHLORIDE, PRESERVATIVE FREE 10 ML: 5 INJECTION INTRAVENOUS at 01:10

## 2023-04-08 RX ADMIN — HYDRALAZINE HYDROCHLORIDE 25 MG: 25 TABLET, FILM COATED ORAL at 06:01

## 2023-04-08 RX ADMIN — ASPIRIN 81 MG: 81 TABLET, COATED ORAL at 09:26

## 2023-04-08 RX ADMIN — CEFEPIME 2000 MG: 2 INJECTION, POWDER, FOR SOLUTION INTRAVENOUS at 14:44

## 2023-04-08 RX ADMIN — ENOXAPARIN SODIUM 40 MG: 100 INJECTION SUBCUTANEOUS at 20:51

## 2023-04-08 RX ADMIN — INSULIN LISPRO 4 UNITS: 100 INJECTION, SOLUTION INTRAVENOUS; SUBCUTANEOUS at 17:28

## 2023-04-08 RX ADMIN — MICONAZOLE NITRATE: 20 POWDER TOPICAL at 20:47

## 2023-04-08 RX ADMIN — METOPROLOL TARTRATE 25 MG: 25 TABLET, FILM COATED ORAL at 20:47

## 2023-04-08 RX ADMIN — SODIUM CHLORIDE, PRESERVATIVE FREE 10 ML: 5 INJECTION INTRAVENOUS at 20:47

## 2023-04-08 RX ADMIN — ENOXAPARIN SODIUM 40 MG: 100 INJECTION SUBCUTANEOUS at 09:26

## 2023-04-08 RX ADMIN — FERROUS SULFATE TAB 325 MG (65 MG ELEMENTAL FE) 325 MG: 325 (65 FE) TAB at 09:23

## 2023-04-08 RX ADMIN — LEVETIRACETAM 500 MG: 500 TABLET, FILM COATED ORAL at 20:47

## 2023-04-08 RX ADMIN — GABAPENTIN 300 MG: 300 CAPSULE ORAL at 09:23

## 2023-04-08 RX ADMIN — FERROUS SULFATE TAB 325 MG (65 MG ELEMENTAL FE) 325 MG: 325 (65 FE) TAB at 17:18

## 2023-04-08 RX ADMIN — MICONAZOLE NITRATE: 20 POWDER TOPICAL at 09:24

## 2023-04-08 RX ADMIN — GABAPENTIN 300 MG: 300 CAPSULE ORAL at 14:44

## 2023-04-08 RX ADMIN — HYDRALAZINE HYDROCHLORIDE 25 MG: 25 TABLET, FILM COATED ORAL at 15:19

## 2023-04-09 LAB
ANION GAP SERPL CALC-SCNC: 10 MEQ/L (ref 8–16)
BASOPHILS ABSOLUTE: 0.1 THOU/MM3 (ref 0–0.1)
BASOPHILS NFR BLD AUTO: 1.2 %
BUN SERPL-MCNC: 24 MG/DL (ref 7–22)
CALCIUM SERPL-MCNC: 9.1 MG/DL (ref 8.5–10.5)
CHLORIDE SERPL-SCNC: 105 MEQ/L (ref 98–111)
CO2 SERPL-SCNC: 25 MEQ/L (ref 23–33)
CREAT SERPL-MCNC: 1.2 MG/DL (ref 0.4–1.2)
DEPRECATED RDW RBC AUTO: 46.5 FL (ref 35–45)
EOSINOPHIL NFR BLD AUTO: 4.4 %
EOSINOPHILS ABSOLUTE: 0.3 THOU/MM3 (ref 0–0.4)
ERYTHROCYTE [DISTWIDTH] IN BLOOD BY AUTOMATED COUNT: 13.7 % (ref 11.5–14.5)
GFR SERPL CREATININE-BSD FRML MDRD: > 60 ML/MIN/1.73M2
GLUCOSE BLD STRIP.AUTO-MCNC: 163 MG/DL (ref 70–108)
GLUCOSE BLD STRIP.AUTO-MCNC: 181 MG/DL (ref 70–108)
GLUCOSE BLD STRIP.AUTO-MCNC: 182 MG/DL (ref 70–108)
GLUCOSE BLD STRIP.AUTO-MCNC: 202 MG/DL (ref 70–108)
GLUCOSE SERPL-MCNC: 199 MG/DL (ref 70–108)
HCT VFR BLD AUTO: 37.5 % (ref 42–52)
HGB BLD-MCNC: 11.5 GM/DL (ref 14–18)
IMM GRANULOCYTES # BLD AUTO: 0.03 THOU/MM3 (ref 0–0.07)
IMM GRANULOCYTES NFR BLD AUTO: 0.5 %
LYMPHOCYTES ABSOLUTE: 1.9 THOU/MM3 (ref 1–4.8)
LYMPHOCYTES NFR BLD AUTO: 29.1 %
MAGNESIUM SERPL-MCNC: 2 MG/DL (ref 1.6–2.4)
MCH RBC QN AUTO: 28.4 PG (ref 26–33)
MCHC RBC AUTO-ENTMCNC: 30.7 GM/DL (ref 32.2–35.5)
MCV RBC AUTO: 92.6 FL (ref 80–94)
MONOCYTES ABSOLUTE: 0.5 THOU/MM3 (ref 0.4–1.3)
MONOCYTES NFR BLD AUTO: 7.4 %
NEUTROPHILS NFR BLD AUTO: 57.4 %
NRBC BLD AUTO-RTO: 0 /100 WBC
PHOSPHATE SERPL-MCNC: 3.7 MG/DL (ref 2.4–4.7)
PLATELET # BLD AUTO: 184 THOU/MM3 (ref 130–400)
PMV BLD AUTO: 11.4 FL (ref 9.4–12.4)
POTASSIUM SERPL-SCNC: 5.1 MEQ/L (ref 3.5–5.2)
RBC # BLD AUTO: 4.05 MILL/MM3 (ref 4.7–6.1)
SEGMENTED NEUTROPHILS ABSOLUTE COUNT: 3.7 THOU/MM3 (ref 1.8–7.7)
SODIUM SERPL-SCNC: 140 MEQ/L (ref 135–145)
WBC # BLD AUTO: 6.5 THOU/MM3 (ref 4.8–10.8)

## 2023-04-09 PROCEDURE — 6370000000 HC RX 637 (ALT 250 FOR IP): Performed by: STUDENT IN AN ORGANIZED HEALTH CARE EDUCATION/TRAINING PROGRAM

## 2023-04-09 PROCEDURE — 6360000002 HC RX W HCPCS

## 2023-04-09 PROCEDURE — 6370000000 HC RX 637 (ALT 250 FOR IP): Performed by: INTERNAL MEDICINE

## 2023-04-09 PROCEDURE — 1200000000 HC SEMI PRIVATE

## 2023-04-09 PROCEDURE — 83735 ASSAY OF MAGNESIUM: CPT

## 2023-04-09 PROCEDURE — 85025 COMPLETE CBC W/AUTO DIFF WBC: CPT

## 2023-04-09 PROCEDURE — 6370000000 HC RX 637 (ALT 250 FOR IP)

## 2023-04-09 PROCEDURE — 2580000003 HC RX 258

## 2023-04-09 PROCEDURE — 2500000003 HC RX 250 WO HCPCS

## 2023-04-09 PROCEDURE — 84100 ASSAY OF PHOSPHORUS: CPT

## 2023-04-09 PROCEDURE — 82948 REAGENT STRIP/BLOOD GLUCOSE: CPT

## 2023-04-09 PROCEDURE — 6370000000 HC RX 637 (ALT 250 FOR IP): Performed by: PHYSICIAN ASSISTANT

## 2023-04-09 PROCEDURE — 36415 COLL VENOUS BLD VENIPUNCTURE: CPT

## 2023-04-09 PROCEDURE — 80048 BASIC METABOLIC PNL TOTAL CA: CPT

## 2023-04-09 PROCEDURE — 99232 SBSQ HOSP IP/OBS MODERATE 35: CPT | Performed by: INTERNAL MEDICINE

## 2023-04-09 RX ORDER — HYDRALAZINE HYDROCHLORIDE 50 MG/1
50 TABLET, FILM COATED ORAL EVERY 8 HOURS SCHEDULED
Status: DISCONTINUED | OUTPATIENT
Start: 2023-04-09 | End: 2023-04-12

## 2023-04-09 RX ORDER — FUROSEMIDE 40 MG/1
40 TABLET ORAL DAILY
Status: DISCONTINUED | OUTPATIENT
Start: 2023-04-09 | End: 2023-04-10

## 2023-04-09 RX ORDER — CARVEDILOL 6.25 MG/1
6.25 TABLET ORAL 2 TIMES DAILY WITH MEALS
Status: DISCONTINUED | OUTPATIENT
Start: 2023-04-09 | End: 2023-04-09

## 2023-04-09 RX ADMIN — INSULIN LISPRO 5 UNITS: 100 INJECTION, SOLUTION INTRAVENOUS; SUBCUTANEOUS at 11:53

## 2023-04-09 RX ADMIN — LISINOPRIL 20 MG: 20 TABLET ORAL at 07:40

## 2023-04-09 RX ADMIN — GABAPENTIN 300 MG: 300 CAPSULE ORAL at 07:40

## 2023-04-09 RX ADMIN — GABAPENTIN 300 MG: 300 CAPSULE ORAL at 20:47

## 2023-04-09 RX ADMIN — ENOXAPARIN SODIUM 40 MG: 100 INJECTION SUBCUTANEOUS at 07:42

## 2023-04-09 RX ADMIN — GABAPENTIN 300 MG: 300 CAPSULE ORAL at 13:49

## 2023-04-09 RX ADMIN — INSULIN LISPRO 5 UNITS: 100 INJECTION, SOLUTION INTRAVENOUS; SUBCUTANEOUS at 17:15

## 2023-04-09 RX ADMIN — ASPIRIN 81 MG: 81 TABLET, COATED ORAL at 07:40

## 2023-04-09 RX ADMIN — LEVETIRACETAM 500 MG: 500 TABLET, FILM COATED ORAL at 20:47

## 2023-04-09 RX ADMIN — PANTOPRAZOLE SODIUM 40 MG: 40 TABLET, DELAYED RELEASE ORAL at 05:22

## 2023-04-09 RX ADMIN — HYDRALAZINE HYDROCHLORIDE 25 MG: 25 TABLET, FILM COATED ORAL at 05:22

## 2023-04-09 RX ADMIN — METOPROLOL TARTRATE 25 MG: 25 TABLET, FILM COATED ORAL at 20:47

## 2023-04-09 RX ADMIN — SODIUM CHLORIDE, PRESERVATIVE FREE 10 ML: 5 INJECTION INTRAVENOUS at 20:48

## 2023-04-09 RX ADMIN — LEVETIRACETAM 500 MG: 500 TABLET, FILM COATED ORAL at 07:41

## 2023-04-09 RX ADMIN — AMLODIPINE BESYLATE 10 MG: 10 TABLET ORAL at 07:40

## 2023-04-09 RX ADMIN — INSULIN LISPRO 4 UNITS: 100 INJECTION, SOLUTION INTRAVENOUS; SUBCUTANEOUS at 09:45

## 2023-04-09 RX ADMIN — INSULIN LISPRO 5 UNITS: 100 INJECTION, SOLUTION INTRAVENOUS; SUBCUTANEOUS at 09:36

## 2023-04-09 RX ADMIN — HYDRALAZINE HYDROCHLORIDE 50 MG: 50 TABLET ORAL at 21:36

## 2023-04-09 RX ADMIN — SERTRALINE 50 MG: 50 TABLET, FILM COATED ORAL at 07:41

## 2023-04-09 RX ADMIN — SODIUM ZIRCONIUM CYCLOSILICATE 10 G: 10 POWDER, FOR SUSPENSION ORAL at 17:32

## 2023-04-09 RX ADMIN — CEFEPIME 2000 MG: 2 INJECTION, POWDER, FOR SOLUTION INTRAVENOUS at 13:44

## 2023-04-09 RX ADMIN — SODIUM CHLORIDE: 9 INJECTION, SOLUTION INTRAVENOUS at 02:40

## 2023-04-09 RX ADMIN — MICONAZOLE NITRATE: 20 POWDER TOPICAL at 20:47

## 2023-04-09 RX ADMIN — INSULIN GLARGINE 30 UNITS: 100 INJECTION, SOLUTION SUBCUTANEOUS at 21:33

## 2023-04-09 RX ADMIN — ENOXAPARIN SODIUM 40 MG: 100 INJECTION SUBCUTANEOUS at 20:46

## 2023-04-09 RX ADMIN — SODIUM CHLORIDE: 9 INJECTION, SOLUTION INTRAVENOUS at 13:44

## 2023-04-09 RX ADMIN — CEFEPIME 2000 MG: 2 INJECTION, POWDER, FOR SOLUTION INTRAVENOUS at 02:45

## 2023-04-09 RX ADMIN — ATORVASTATIN CALCIUM 80 MG: 80 TABLET, FILM COATED ORAL at 20:46

## 2023-04-09 RX ADMIN — FERROUS SULFATE TAB 325 MG (65 MG ELEMENTAL FE) 325 MG: 325 (65 FE) TAB at 17:15

## 2023-04-09 RX ADMIN — SODIUM CHLORIDE, PRESERVATIVE FREE 10 ML: 5 INJECTION INTRAVENOUS at 07:42

## 2023-04-09 RX ADMIN — HYDRALAZINE HYDROCHLORIDE 50 MG: 50 TABLET ORAL at 13:49

## 2023-04-09 RX ADMIN — INSULIN GLARGINE 30 UNITS: 100 INJECTION, SOLUTION SUBCUTANEOUS at 09:35

## 2023-04-09 RX ADMIN — FERROUS SULFATE TAB 325 MG (65 MG ELEMENTAL FE) 325 MG: 325 (65 FE) TAB at 07:40

## 2023-04-10 LAB
ANION GAP SERPL CALC-SCNC: 16 MEQ/L (ref 8–16)
BUN SERPL-MCNC: 26 MG/DL (ref 7–22)
CALCIUM SERPL-MCNC: 9.3 MG/DL (ref 8.5–10.5)
CHLORIDE SERPL-SCNC: 105 MEQ/L (ref 98–111)
CO2 SERPL-SCNC: 18 MEQ/L (ref 23–33)
CREAT SERPL-MCNC: 0.9 MG/DL (ref 0.4–1.2)
GFR SERPL CREATININE-BSD FRML MDRD: > 60 ML/MIN/1.73M2
GLUCOSE BLD STRIP.AUTO-MCNC: 153 MG/DL (ref 70–108)
GLUCOSE BLD STRIP.AUTO-MCNC: 164 MG/DL (ref 70–108)
GLUCOSE BLD STRIP.AUTO-MCNC: 166 MG/DL (ref 70–108)
GLUCOSE BLD STRIP.AUTO-MCNC: 256 MG/DL (ref 70–108)
GLUCOSE SERPL-MCNC: 164 MG/DL (ref 70–108)
MAGNESIUM SERPL-MCNC: 1.9 MG/DL (ref 1.6–2.4)
PHOSPHATE SERPL-MCNC: 3.2 MG/DL (ref 2.4–4.7)
POTASSIUM SERPL-SCNC: 4.2 MEQ/L (ref 3.5–5.2)
SODIUM SERPL-SCNC: 139 MEQ/L (ref 135–145)

## 2023-04-10 PROCEDURE — 83735 ASSAY OF MAGNESIUM: CPT

## 2023-04-10 PROCEDURE — 1200000000 HC SEMI PRIVATE

## 2023-04-10 PROCEDURE — 2580000003 HC RX 258

## 2023-04-10 PROCEDURE — 6370000000 HC RX 637 (ALT 250 FOR IP)

## 2023-04-10 PROCEDURE — 99232 SBSQ HOSP IP/OBS MODERATE 35: CPT | Performed by: INTERNAL MEDICINE

## 2023-04-10 PROCEDURE — 97535 SELF CARE MNGMENT TRAINING: CPT

## 2023-04-10 PROCEDURE — 36415 COLL VENOUS BLD VENIPUNCTURE: CPT

## 2023-04-10 PROCEDURE — 6360000002 HC RX W HCPCS

## 2023-04-10 PROCEDURE — 6370000000 HC RX 637 (ALT 250 FOR IP): Performed by: STUDENT IN AN ORGANIZED HEALTH CARE EDUCATION/TRAINING PROGRAM

## 2023-04-10 PROCEDURE — 6370000000 HC RX 637 (ALT 250 FOR IP): Performed by: INTERNAL MEDICINE

## 2023-04-10 PROCEDURE — 82948 REAGENT STRIP/BLOOD GLUCOSE: CPT

## 2023-04-10 PROCEDURE — 97110 THERAPEUTIC EXERCISES: CPT

## 2023-04-10 PROCEDURE — 51798 US URINE CAPACITY MEASURE: CPT

## 2023-04-10 PROCEDURE — 84100 ASSAY OF PHOSPHORUS: CPT

## 2023-04-10 PROCEDURE — 97530 THERAPEUTIC ACTIVITIES: CPT

## 2023-04-10 PROCEDURE — 80048 BASIC METABOLIC PNL TOTAL CA: CPT

## 2023-04-10 RX ORDER — LISINOPRIL 10 MG/1
10 TABLET ORAL ONCE
Status: DISCONTINUED | OUTPATIENT
Start: 2023-04-10 | End: 2023-04-11

## 2023-04-10 RX ORDER — INSULIN LISPRO 100 [IU]/ML
7 INJECTION, SOLUTION INTRAVENOUS; SUBCUTANEOUS
Status: DISCONTINUED | OUTPATIENT
Start: 2023-04-10 | End: 2023-04-11

## 2023-04-10 RX ORDER — LISINOPRIL 40 MG/1
40 TABLET ORAL DAILY
Status: DISCONTINUED | OUTPATIENT
Start: 2023-04-11 | End: 2023-04-14 | Stop reason: HOSPADM

## 2023-04-10 RX ORDER — BUMETANIDE 0.25 MG/ML
1 INJECTION INTRAMUSCULAR; INTRAVENOUS 2 TIMES DAILY
Status: DISCONTINUED | OUTPATIENT
Start: 2023-04-11 | End: 2023-04-11

## 2023-04-10 RX ORDER — MAGNESIUM SULFATE IN WATER 40 MG/ML
2000 INJECTION, SOLUTION INTRAVENOUS ONCE
Status: COMPLETED | OUTPATIENT
Start: 2023-04-10 | End: 2023-04-10

## 2023-04-10 RX ADMIN — FUROSEMIDE 40 MG: 40 TABLET ORAL at 09:25

## 2023-04-10 RX ADMIN — SERTRALINE 50 MG: 50 TABLET, FILM COATED ORAL at 09:25

## 2023-04-10 RX ADMIN — INSULIN LISPRO 7 UNITS: 100 INJECTION, SOLUTION INTRAVENOUS; SUBCUTANEOUS at 12:17

## 2023-04-10 RX ADMIN — SODIUM CHLORIDE, PRESERVATIVE FREE 10 ML: 5 INJECTION INTRAVENOUS at 21:19

## 2023-04-10 RX ADMIN — MAGNESIUM SULFATE HEPTAHYDRATE 2000 MG: 40 INJECTION, SOLUTION INTRAVENOUS at 09:52

## 2023-04-10 RX ADMIN — AMLODIPINE BESYLATE 10 MG: 10 TABLET ORAL at 09:25

## 2023-04-10 RX ADMIN — ATORVASTATIN CALCIUM 80 MG: 80 TABLET, FILM COATED ORAL at 21:17

## 2023-04-10 RX ADMIN — LEVETIRACETAM 500 MG: 500 TABLET, FILM COATED ORAL at 09:25

## 2023-04-10 RX ADMIN — HYDRALAZINE HYDROCHLORIDE 50 MG: 50 TABLET ORAL at 12:17

## 2023-04-10 RX ADMIN — GABAPENTIN 300 MG: 300 CAPSULE ORAL at 21:16

## 2023-04-10 RX ADMIN — PANTOPRAZOLE SODIUM 40 MG: 40 TABLET, DELAYED RELEASE ORAL at 06:08

## 2023-04-10 RX ADMIN — HYDRALAZINE HYDROCHLORIDE 50 MG: 50 TABLET ORAL at 21:18

## 2023-04-10 RX ADMIN — METOPROLOL TARTRATE 25 MG: 25 TABLET, FILM COATED ORAL at 09:25

## 2023-04-10 RX ADMIN — LISINOPRIL 20 MG: 20 TABLET ORAL at 09:25

## 2023-04-10 RX ADMIN — SODIUM ZIRCONIUM CYCLOSILICATE 10 G: 10 POWDER, FOR SUSPENSION ORAL at 09:26

## 2023-04-10 RX ADMIN — FERROUS SULFATE TAB 325 MG (65 MG ELEMENTAL FE) 325 MG: 325 (65 FE) TAB at 09:25

## 2023-04-10 RX ADMIN — GABAPENTIN 300 MG: 300 CAPSULE ORAL at 09:24

## 2023-04-10 RX ADMIN — ENOXAPARIN SODIUM 40 MG: 100 INJECTION SUBCUTANEOUS at 21:16

## 2023-04-10 RX ADMIN — FERROUS SULFATE TAB 325 MG (65 MG ELEMENTAL FE) 325 MG: 325 (65 FE) TAB at 18:00

## 2023-04-10 RX ADMIN — ENOXAPARIN SODIUM 40 MG: 100 INJECTION SUBCUTANEOUS at 09:25

## 2023-04-10 RX ADMIN — ASPIRIN 81 MG: 81 TABLET, COATED ORAL at 09:25

## 2023-04-10 RX ADMIN — INSULIN GLARGINE 30 UNITS: 100 INJECTION, SOLUTION SUBCUTANEOUS at 09:44

## 2023-04-10 RX ADMIN — LEVETIRACETAM 500 MG: 500 TABLET, FILM COATED ORAL at 21:16

## 2023-04-10 RX ADMIN — INSULIN LISPRO 8 UNITS: 100 INJECTION, SOLUTION INTRAVENOUS; SUBCUTANEOUS at 12:16

## 2023-04-10 RX ADMIN — CEFEPIME 2000 MG: 2 INJECTION, POWDER, FOR SOLUTION INTRAVENOUS at 02:03

## 2023-04-10 RX ADMIN — MICONAZOLE NITRATE: 20 POWDER TOPICAL at 21:16

## 2023-04-10 RX ADMIN — GABAPENTIN 300 MG: 300 CAPSULE ORAL at 14:47

## 2023-04-10 RX ADMIN — INSULIN LISPRO 7 UNITS: 100 INJECTION, SOLUTION INTRAVENOUS; SUBCUTANEOUS at 18:00

## 2023-04-10 RX ADMIN — INSULIN LISPRO 7 UNITS: 100 INJECTION, SOLUTION INTRAVENOUS; SUBCUTANEOUS at 09:44

## 2023-04-10 RX ADMIN — METOPROLOL TARTRATE 12.5 MG: 25 TABLET, FILM COATED ORAL at 21:16

## 2023-04-10 RX ADMIN — INSULIN GLARGINE 30 UNITS: 100 INJECTION, SOLUTION SUBCUTANEOUS at 21:22

## 2023-04-10 RX ADMIN — CEFEPIME 2000 MG: 2 INJECTION, POWDER, FOR SOLUTION INTRAVENOUS at 14:48

## 2023-04-10 RX ADMIN — MICONAZOLE NITRATE: 20 POWDER TOPICAL at 09:26

## 2023-04-10 RX ADMIN — HYDRALAZINE HYDROCHLORIDE 50 MG: 50 TABLET ORAL at 06:07

## 2023-04-10 NOTE — CARE COORDINATION
4/10/23, 2:15 PM EDT    DISCHARGE ON GOING Lesia 45 day: 2  Location: -19/019-A Reason for admit: UTI (urinary tract infection) [N39.0]  Gait instability [R26.81]  Pressure injury of skin, unspecified injury stage, unspecified location [L89.90]  Urinary tract infection [N39.0]   Barriers to Discharge: Maxipime, Lovenox, DM management, prn Tylenol, Vistaril, and Zofran, Magnesium and Potassium replacement protocol, BMP and CBC in a.m., carb choice diet with fluid restriction, daily weights, fall precautions, strict I & O, up with assistance. PCP: REI Kline - NP (Inactive)  Readmission Risk Score: 15.4%  Patient Goals/Plan/Treatment Preferences: Fredy Genao is from home with his brother whom also is admitted for SNF placement. He is accepted to [facility]. Pre-cert pending.

## 2023-04-10 NOTE — CARE COORDINATION
4/10/23, 7:45 AM EDT    DISCHARGE PLANNING EVALUATION    MARLENY faxed PT/OT notes to [facility] to have precert started. 8:51 AM EDT  Call to [facility] with [facility], she did get therapy notes from 4/7, she will start precert, insurance may need updated PT/OT notes. MARLENY did let her know PT/OT asked to see pt today, will fax updated therapy notes once available. 1:05 PM EDT  MARLENY faxed updated PT/OT notes to [facility] at [facility].

## 2023-04-11 ENCOUNTER — APPOINTMENT (OUTPATIENT)
Dept: ULTRASOUND IMAGING | Age: 68
DRG: 463 | End: 2023-04-11
Payer: COMMERCIAL

## 2023-04-11 LAB
ALBUMIN SERPL BCG-MCNC: 3.4 G/DL (ref 3.5–5.1)
ALP SERPL-CCNC: 82 U/L (ref 38–126)
ALT SERPL W/O P-5'-P-CCNC: 13 U/L (ref 11–66)
ANION GAP SERPL CALC-SCNC: 11 MEQ/L (ref 8–16)
AST SERPL-CCNC: 15 U/L (ref 5–40)
BACTERIA UR CULT: ABNORMAL
BILIRUB CONJ SERPL-MCNC: < 0.2 MG/DL (ref 0–0.3)
BILIRUB SERPL-MCNC: 0.2 MG/DL (ref 0.3–1.2)
BUN SERPL-MCNC: 30 MG/DL (ref 7–22)
CALCIUM SERPL-MCNC: 9.3 MG/DL (ref 8.5–10.5)
CHLORIDE SERPL-SCNC: 102 MEQ/L (ref 98–111)
CO2 SERPL-SCNC: 25 MEQ/L (ref 23–33)
CREAT SERPL-MCNC: 1.3 MG/DL (ref 0.4–1.2)
DEPRECATED RDW RBC AUTO: 45.9 FL (ref 35–45)
ERYTHROCYTE [DISTWIDTH] IN BLOOD BY AUTOMATED COUNT: 13.8 % (ref 11.5–14.5)
GFR SERPL CREATININE-BSD FRML MDRD: 60 ML/MIN/1.73M2
GLUCOSE BLD STRIP.AUTO-MCNC: 140 MG/DL (ref 70–108)
GLUCOSE BLD STRIP.AUTO-MCNC: 195 MG/DL (ref 70–108)
GLUCOSE BLD STRIP.AUTO-MCNC: 205 MG/DL (ref 70–108)
GLUCOSE BLD STRIP.AUTO-MCNC: 223 MG/DL (ref 70–108)
GLUCOSE SERPL-MCNC: 160 MG/DL (ref 70–108)
HCT VFR BLD AUTO: 37.9 % (ref 42–52)
HGB BLD-MCNC: 11.8 GM/DL (ref 14–18)
MAGNESIUM SERPL-MCNC: 2 MG/DL (ref 1.6–2.4)
MCH RBC QN AUTO: 28.4 PG (ref 26–33)
MCHC RBC AUTO-ENTMCNC: 31.1 GM/DL (ref 32.2–35.5)
MCV RBC AUTO: 91.3 FL (ref 80–94)
ORGANISM: ABNORMAL
PLATELET # BLD AUTO: 174 THOU/MM3 (ref 130–400)
PMV BLD AUTO: 11 FL (ref 9.4–12.4)
POTASSIUM SERPL-SCNC: 4.1 MEQ/L (ref 3.5–5.2)
PROT SERPL-MCNC: 6.1 G/DL (ref 6.1–8)
RBC # BLD AUTO: 4.15 MILL/MM3 (ref 4.7–6.1)
SODIUM SERPL-SCNC: 138 MEQ/L (ref 135–145)
WBC # BLD AUTO: 6 THOU/MM3 (ref 4.8–10.8)

## 2023-04-11 PROCEDURE — 1200000000 HC SEMI PRIVATE

## 2023-04-11 PROCEDURE — 6370000000 HC RX 637 (ALT 250 FOR IP): Performed by: STUDENT IN AN ORGANIZED HEALTH CARE EDUCATION/TRAINING PROGRAM

## 2023-04-11 PROCEDURE — 51798 US URINE CAPACITY MEASURE: CPT

## 2023-04-11 PROCEDURE — 6370000000 HC RX 637 (ALT 250 FOR IP)

## 2023-04-11 PROCEDURE — 99232 SBSQ HOSP IP/OBS MODERATE 35: CPT | Performed by: INTERNAL MEDICINE

## 2023-04-11 PROCEDURE — 36415 COLL VENOUS BLD VENIPUNCTURE: CPT

## 2023-04-11 PROCEDURE — 82948 REAGENT STRIP/BLOOD GLUCOSE: CPT

## 2023-04-11 PROCEDURE — 2580000003 HC RX 258

## 2023-04-11 PROCEDURE — 76770 US EXAM ABDO BACK WALL COMP: CPT

## 2023-04-11 PROCEDURE — 2580000003 HC RX 258: Performed by: STUDENT IN AN ORGANIZED HEALTH CARE EDUCATION/TRAINING PROGRAM

## 2023-04-11 PROCEDURE — 6360000002 HC RX W HCPCS

## 2023-04-11 PROCEDURE — 85027 COMPLETE CBC AUTOMATED: CPT

## 2023-04-11 PROCEDURE — 80053 COMPREHEN METABOLIC PANEL: CPT

## 2023-04-11 PROCEDURE — 82248 BILIRUBIN DIRECT: CPT

## 2023-04-11 PROCEDURE — 83735 ASSAY OF MAGNESIUM: CPT

## 2023-04-11 RX ORDER — INSULIN LISPRO 100 [IU]/ML
10 INJECTION, SOLUTION INTRAVENOUS; SUBCUTANEOUS
Status: DISCONTINUED | OUTPATIENT
Start: 2023-04-12 | End: 2023-04-13

## 2023-04-11 RX ORDER — 0.9 % SODIUM CHLORIDE 0.9 %
500 INTRAVENOUS SOLUTION INTRAVENOUS ONCE
Status: COMPLETED | OUTPATIENT
Start: 2023-04-11 | End: 2023-04-11

## 2023-04-11 RX ADMIN — METOPROLOL TARTRATE 12.5 MG: 25 TABLET, FILM COATED ORAL at 20:36

## 2023-04-11 RX ADMIN — GABAPENTIN 300 MG: 300 CAPSULE ORAL at 20:36

## 2023-04-11 RX ADMIN — ASPIRIN 81 MG: 81 TABLET, COATED ORAL at 10:03

## 2023-04-11 RX ADMIN — INSULIN LISPRO 7 UNITS: 100 INJECTION, SOLUTION INTRAVENOUS; SUBCUTANEOUS at 17:09

## 2023-04-11 RX ADMIN — ENOXAPARIN SODIUM 40 MG: 100 INJECTION SUBCUTANEOUS at 10:03

## 2023-04-11 RX ADMIN — GABAPENTIN 300 MG: 300 CAPSULE ORAL at 10:03

## 2023-04-11 RX ADMIN — HYDRALAZINE HYDROCHLORIDE 50 MG: 50 TABLET ORAL at 20:36

## 2023-04-11 RX ADMIN — HYDRALAZINE HYDROCHLORIDE 50 MG: 50 TABLET ORAL at 05:36

## 2023-04-11 RX ADMIN — MICONAZOLE NITRATE: 20 POWDER TOPICAL at 10:26

## 2023-04-11 RX ADMIN — SODIUM CHLORIDE, PRESERVATIVE FREE 10 ML: 5 INJECTION INTRAVENOUS at 10:08

## 2023-04-11 RX ADMIN — MICONAZOLE NITRATE: 20 POWDER TOPICAL at 21:48

## 2023-04-11 RX ADMIN — CEFEPIME 2000 MG: 2 INJECTION, POWDER, FOR SOLUTION INTRAVENOUS at 15:06

## 2023-04-11 RX ADMIN — SERTRALINE 50 MG: 50 TABLET, FILM COATED ORAL at 10:03

## 2023-04-11 RX ADMIN — INSULIN LISPRO 7 UNITS: 100 INJECTION, SOLUTION INTRAVENOUS; SUBCUTANEOUS at 09:59

## 2023-04-11 RX ADMIN — INSULIN GLARGINE 30 UNITS: 100 INJECTION, SOLUTION SUBCUTANEOUS at 10:00

## 2023-04-11 RX ADMIN — FERROUS SULFATE TAB 325 MG (65 MG ELEMENTAL FE) 325 MG: 325 (65 FE) TAB at 17:10

## 2023-04-11 RX ADMIN — LEVETIRACETAM 500 MG: 500 TABLET, FILM COATED ORAL at 10:03

## 2023-04-11 RX ADMIN — ATORVASTATIN CALCIUM 80 MG: 80 TABLET, FILM COATED ORAL at 20:36

## 2023-04-11 RX ADMIN — CEFEPIME 2000 MG: 2 INJECTION, POWDER, FOR SOLUTION INTRAVENOUS at 04:34

## 2023-04-11 RX ADMIN — INSULIN LISPRO 7 UNITS: 100 INJECTION, SOLUTION INTRAVENOUS; SUBCUTANEOUS at 11:51

## 2023-04-11 RX ADMIN — PANTOPRAZOLE SODIUM 40 MG: 40 TABLET, DELAYED RELEASE ORAL at 05:36

## 2023-04-11 RX ADMIN — INSULIN GLARGINE 30 UNITS: 100 INJECTION, SOLUTION SUBCUTANEOUS at 21:45

## 2023-04-11 RX ADMIN — INSULIN LISPRO 4 UNITS: 100 INJECTION, SOLUTION INTRAVENOUS; SUBCUTANEOUS at 17:10

## 2023-04-11 RX ADMIN — LISINOPRIL 40 MG: 40 TABLET ORAL at 10:03

## 2023-04-11 RX ADMIN — GABAPENTIN 300 MG: 300 CAPSULE ORAL at 15:06

## 2023-04-11 RX ADMIN — AMLODIPINE BESYLATE 10 MG: 10 TABLET ORAL at 10:03

## 2023-04-11 RX ADMIN — FERROUS SULFATE TAB 325 MG (65 MG ELEMENTAL FE) 325 MG: 325 (65 FE) TAB at 10:03

## 2023-04-11 RX ADMIN — SODIUM CHLORIDE 500 ML: 9 INJECTION, SOLUTION INTRAVENOUS at 10:25

## 2023-04-11 RX ADMIN — ENOXAPARIN SODIUM 40 MG: 100 INJECTION SUBCUTANEOUS at 21:45

## 2023-04-11 RX ADMIN — LEVETIRACETAM 500 MG: 500 TABLET, FILM COATED ORAL at 20:36

## 2023-04-11 RX ADMIN — METOPROLOL TARTRATE 12.5 MG: 25 TABLET, FILM COATED ORAL at 10:03

## 2023-04-11 RX ADMIN — INSULIN LISPRO 4 UNITS: 100 INJECTION, SOLUTION INTRAVENOUS; SUBCUTANEOUS at 11:51

## 2023-04-11 RX ADMIN — HYDRALAZINE HYDROCHLORIDE 50 MG: 50 TABLET ORAL at 15:06

## 2023-04-11 NOTE — PROCEDURES
Bladder scan completed and results given to [facility] RN     501 ml of urine in bladder at this time.

## 2023-04-11 NOTE — CARE COORDINATION
4/11/23, 8:08 AM EDT    DISCHARGE PLANNING EVALUATION    Call to [facility] with Nelwyn Riddles to check on precert, left a voicemail for a call back. 9:45 AM EDT  Call from [facility] with Nelwyn Riddles, precert still pending.

## 2023-04-12 LAB
ALBUMIN SERPL BCG-MCNC: 3.4 G/DL (ref 3.5–5.1)
ALP SERPL-CCNC: 79 U/L (ref 38–126)
ALT SERPL W/O P-5'-P-CCNC: 19 U/L (ref 11–66)
ANION GAP SERPL CALC-SCNC: 9 MEQ/L (ref 8–16)
AST SERPL-CCNC: 24 U/L (ref 5–40)
BILIRUB CONJ SERPL-MCNC: < 0.2 MG/DL (ref 0–0.3)
BILIRUB SERPL-MCNC: 0.2 MG/DL (ref 0.3–1.2)
BUN SERPL-MCNC: 30 MG/DL (ref 7–22)
CALCIUM SERPL-MCNC: 8.9 MG/DL (ref 8.5–10.5)
CHLORIDE 24H UR-SRATE: 32 MEQ/L
CHLORIDE SERPL-SCNC: 106 MEQ/L (ref 98–111)
CO2 SERPL-SCNC: 23 MEQ/L (ref 23–33)
CREAT SERPL-MCNC: 1 MG/DL (ref 0.4–1.2)
CREAT UR-MCNC: 94.8 MG/DL
DEPRECATED RDW RBC AUTO: 47.9 FL (ref 35–45)
ERYTHROCYTE [DISTWIDTH] IN BLOOD BY AUTOMATED COUNT: 14 % (ref 11.5–14.5)
GFR SERPL CREATININE-BSD FRML MDRD: > 60 ML/MIN/1.73M2
GLUCOSE BLD STRIP.AUTO-MCNC: 113 MG/DL (ref 70–108)
GLUCOSE BLD STRIP.AUTO-MCNC: 217 MG/DL (ref 70–108)
GLUCOSE BLD STRIP.AUTO-MCNC: 226 MG/DL (ref 70–108)
GLUCOSE BLD STRIP.AUTO-MCNC: 234 MG/DL (ref 70–108)
GLUCOSE SERPL-MCNC: 120 MG/DL (ref 70–108)
HCT VFR BLD AUTO: 37.5 % (ref 42–52)
HGB BLD-MCNC: 11.2 GM/DL (ref 14–18)
MAGNESIUM SERPL-MCNC: 2 MG/DL (ref 1.6–2.4)
MCH RBC QN AUTO: 28 PG (ref 26–33)
MCHC RBC AUTO-ENTMCNC: 29.9 GM/DL (ref 32.2–35.5)
MCV RBC AUTO: 93.8 FL (ref 80–94)
OSMOLALITY SERPL: 297 MOSMOL/KG (ref 275–295)
OSMOLALITY UR: 415 MOSMOL/KG (ref 250–750)
PLATELET # BLD AUTO: 168 THOU/MM3 (ref 130–400)
PMV BLD AUTO: 11.1 FL (ref 9.4–12.4)
POTASSIUM SERPL-SCNC: 3.6 MEQ/L (ref 3.5–5.2)
POTASSIUM UR-SCNC: 17.3 MEQ/L
PROT SERPL-MCNC: 6.1 G/DL (ref 6.1–8)
RBC # BLD AUTO: 4 MILL/MM3 (ref 4.7–6.1)
SODIUM SERPL-SCNC: 138 MEQ/L (ref 135–145)
SODIUM UR-SCNC: 36 MEQ/L
T4 FREE SERPL-MCNC: 1.25 NG/DL (ref 0.93–1.76)
TSH SERPL DL<=0.005 MIU/L-ACNC: 4.51 UIU/ML (ref 0.4–4.2)
UUN 24H UR-MCNC: 782 MG/DL
WBC # BLD AUTO: 6.2 THOU/MM3 (ref 4.8–10.8)

## 2023-04-12 PROCEDURE — 84443 ASSAY THYROID STIM HORMONE: CPT

## 2023-04-12 PROCEDURE — 99232 SBSQ HOSP IP/OBS MODERATE 35: CPT | Performed by: INTERNAL MEDICINE

## 2023-04-12 PROCEDURE — 83935 ASSAY OF URINE OSMOLALITY: CPT

## 2023-04-12 PROCEDURE — 6370000000 HC RX 637 (ALT 250 FOR IP): Performed by: STUDENT IN AN ORGANIZED HEALTH CARE EDUCATION/TRAINING PROGRAM

## 2023-04-12 PROCEDURE — 84300 ASSAY OF URINE SODIUM: CPT

## 2023-04-12 PROCEDURE — 2580000003 HC RX 258

## 2023-04-12 PROCEDURE — 6370000000 HC RX 637 (ALT 250 FOR IP): Performed by: INTERNAL MEDICINE

## 2023-04-12 PROCEDURE — 6360000002 HC RX W HCPCS

## 2023-04-12 PROCEDURE — 97535 SELF CARE MNGMENT TRAINING: CPT

## 2023-04-12 PROCEDURE — 1200000000 HC SEMI PRIVATE

## 2023-04-12 PROCEDURE — 2500000003 HC RX 250 WO HCPCS

## 2023-04-12 PROCEDURE — 83735 ASSAY OF MAGNESIUM: CPT

## 2023-04-12 PROCEDURE — 6370000000 HC RX 637 (ALT 250 FOR IP)

## 2023-04-12 PROCEDURE — 82436 ASSAY OF URINE CHLORIDE: CPT

## 2023-04-12 PROCEDURE — 36415 COLL VENOUS BLD VENIPUNCTURE: CPT

## 2023-04-12 PROCEDURE — 84540 ASSAY OF URINE/UREA-N: CPT

## 2023-04-12 PROCEDURE — 97530 THERAPEUTIC ACTIVITIES: CPT

## 2023-04-12 PROCEDURE — 83930 ASSAY OF BLOOD OSMOLALITY: CPT

## 2023-04-12 PROCEDURE — 80053 COMPREHEN METABOLIC PANEL: CPT

## 2023-04-12 PROCEDURE — 84439 ASSAY OF FREE THYROXINE: CPT

## 2023-04-12 PROCEDURE — 85027 COMPLETE CBC AUTOMATED: CPT

## 2023-04-12 PROCEDURE — 82948 REAGENT STRIP/BLOOD GLUCOSE: CPT

## 2023-04-12 PROCEDURE — 82570 ASSAY OF URINE CREATININE: CPT

## 2023-04-12 PROCEDURE — 82248 BILIRUBIN DIRECT: CPT

## 2023-04-12 PROCEDURE — 51798 US URINE CAPACITY MEASURE: CPT

## 2023-04-12 PROCEDURE — 84133 ASSAY OF URINE POTASSIUM: CPT

## 2023-04-12 RX ORDER — HYDRALAZINE HYDROCHLORIDE 50 MG/1
100 TABLET, FILM COATED ORAL EVERY 8 HOURS SCHEDULED
Status: DISCONTINUED | OUTPATIENT
Start: 2023-04-12 | End: 2023-04-14 | Stop reason: HOSPADM

## 2023-04-12 RX ORDER — TAMSULOSIN HYDROCHLORIDE 0.4 MG/1
0.4 CAPSULE ORAL DAILY
Status: DISCONTINUED | OUTPATIENT
Start: 2023-04-12 | End: 2023-04-14 | Stop reason: HOSPADM

## 2023-04-12 RX ADMIN — CEFEPIME 2000 MG: 2 INJECTION, POWDER, FOR SOLUTION INTRAVENOUS at 14:58

## 2023-04-12 RX ADMIN — FERROUS SULFATE TAB 325 MG (65 MG ELEMENTAL FE) 325 MG: 325 (65 FE) TAB at 18:38

## 2023-04-12 RX ADMIN — GABAPENTIN 300 MG: 300 CAPSULE ORAL at 14:54

## 2023-04-12 RX ADMIN — SODIUM CHLORIDE, PRESERVATIVE FREE 10 ML: 5 INJECTION INTRAVENOUS at 10:07

## 2023-04-12 RX ADMIN — PANTOPRAZOLE SODIUM 40 MG: 40 TABLET, DELAYED RELEASE ORAL at 05:43

## 2023-04-12 RX ADMIN — METOPROLOL TARTRATE 12.5 MG: 25 TABLET, FILM COATED ORAL at 10:08

## 2023-04-12 RX ADMIN — HYDRALAZINE HYDROCHLORIDE 50 MG: 50 TABLET ORAL at 14:53

## 2023-04-12 RX ADMIN — LEVETIRACETAM 500 MG: 500 TABLET, FILM COATED ORAL at 10:07

## 2023-04-12 RX ADMIN — INSULIN GLARGINE 30 UNITS: 100 INJECTION, SOLUTION SUBCUTANEOUS at 21:47

## 2023-04-12 RX ADMIN — GABAPENTIN 300 MG: 300 CAPSULE ORAL at 10:08

## 2023-04-12 RX ADMIN — INSULIN LISPRO 10 UNITS: 100 INJECTION, SOLUTION INTRAVENOUS; SUBCUTANEOUS at 18:38

## 2023-04-12 RX ADMIN — ASPIRIN 81 MG: 81 TABLET, COATED ORAL at 10:08

## 2023-04-12 RX ADMIN — TAMSULOSIN HYDROCHLORIDE 0.4 MG: 0.4 CAPSULE ORAL at 10:07

## 2023-04-12 RX ADMIN — ATORVASTATIN CALCIUM 80 MG: 80 TABLET, FILM COATED ORAL at 21:47

## 2023-04-12 RX ADMIN — SODIUM CHLORIDE, PRESERVATIVE FREE 10 ML: 5 INJECTION INTRAVENOUS at 21:52

## 2023-04-12 RX ADMIN — ENOXAPARIN SODIUM 40 MG: 100 INJECTION SUBCUTANEOUS at 21:47

## 2023-04-12 RX ADMIN — CEFEPIME 2000 MG: 2 INJECTION, POWDER, FOR SOLUTION INTRAVENOUS at 02:33

## 2023-04-12 RX ADMIN — FERROUS SULFATE TAB 325 MG (65 MG ELEMENTAL FE) 325 MG: 325 (65 FE) TAB at 10:08

## 2023-04-12 RX ADMIN — HYDRALAZINE HYDROCHLORIDE 100 MG: 50 TABLET, FILM COATED ORAL at 21:47

## 2023-04-12 RX ADMIN — AMLODIPINE BESYLATE 10 MG: 10 TABLET ORAL at 10:08

## 2023-04-12 RX ADMIN — MICONAZOLE NITRATE: 20 POWDER TOPICAL at 21:49

## 2023-04-12 RX ADMIN — LEVETIRACETAM 500 MG: 500 TABLET, FILM COATED ORAL at 21:47

## 2023-04-12 RX ADMIN — INSULIN LISPRO 4 UNITS: 100 INJECTION, SOLUTION INTRAVENOUS; SUBCUTANEOUS at 18:38

## 2023-04-12 RX ADMIN — INSULIN GLARGINE 30 UNITS: 100 INJECTION, SOLUTION SUBCUTANEOUS at 10:08

## 2023-04-12 RX ADMIN — HYDRALAZINE HYDROCHLORIDE 50 MG: 50 TABLET ORAL at 05:13

## 2023-04-12 RX ADMIN — SERTRALINE 50 MG: 50 TABLET, FILM COATED ORAL at 10:07

## 2023-04-12 RX ADMIN — INSULIN LISPRO 4 UNITS: 100 INJECTION, SOLUTION INTRAVENOUS; SUBCUTANEOUS at 12:44

## 2023-04-12 RX ADMIN — MICONAZOLE NITRATE: 20 POWDER TOPICAL at 10:07

## 2023-04-12 RX ADMIN — GABAPENTIN 300 MG: 300 CAPSULE ORAL at 21:47

## 2023-04-12 RX ADMIN — METOPROLOL TARTRATE 12.5 MG: 25 TABLET, FILM COATED ORAL at 21:46

## 2023-04-12 RX ADMIN — ENOXAPARIN SODIUM 40 MG: 100 INJECTION SUBCUTANEOUS at 10:07

## 2023-04-12 RX ADMIN — INSULIN LISPRO 10 UNITS: 100 INJECTION, SOLUTION INTRAVENOUS; SUBCUTANEOUS at 12:44

## 2023-04-12 NOTE — CARE COORDINATION
4/12/23, 3:01 PM EDT    DISCHARGE ON Bessie 21 day: 4  Location: -19/019-A Reason for admit: UTI (urinary tract infection) [N39.0]  Gait instability [R26.81]  Pressure injury of skin, unspecified injury stage, unspecified location [L89.90]  Urinary tract infection [N39.0]   Procedure: N/A  Barriers to Discharge: Maxipime, Lovenox, DM management, prn Tylenol, Vistaril, and Zofran, Magnesium and Potassium replacement protocol, BMP, CBC, and Magnesium in a.m., carb choice with fluid restriction, daily weights, strict I & O, up with assistance. PCP: REI Davies NP (Inactive)  Readmission Risk Score: 16.7%  Patient Goals/Plan/Treatment Preferences: Jenna Cervantes is from home with his brother. They are both admitted for SNF placement. Pre-Cert to [facility] initiated on 4/10/2023.

## 2023-04-12 NOTE — CARE COORDINATION
4/12/23, 10:36 AM EDT    DISCHARGE PLANNING EVALUATION    Received a call from [facility] with [facility], she reports that precert has not been approved yet but she will call as soon as she hears anything.     Electronically signed by CEASAR Corona on 4/12/2023 at 10:37 AM

## 2023-04-12 NOTE — PROCEDURES
Bladder scan completed and results given to [place] RN     665 ml of urine in bladder at this time.

## 2023-04-12 NOTE — PROCEDURES
Bladder scan was completed by Christine Chavez at 1415. The residual amount of 0ml was resulted to [employer]. Very difficult scan due to his obesity.

## 2023-04-13 ENCOUNTER — TELEPHONE (OUTPATIENT)
Dept: UROLOGY | Age: 68
End: 2023-04-13

## 2023-04-13 LAB
ANION GAP SERPL CALC-SCNC: 7 MEQ/L (ref 8–16)
BUN SERPL-MCNC: 37 MG/DL (ref 7–22)
CALCIUM SERPL-MCNC: 8.8 MG/DL (ref 8.5–10.5)
CHLORIDE SERPL-SCNC: 105 MEQ/L (ref 98–111)
CO2 SERPL-SCNC: 25 MEQ/L (ref 23–33)
CREAT SERPL-MCNC: 1.1 MG/DL (ref 0.4–1.2)
DEPRECATED RDW RBC AUTO: 47.8 FL (ref 35–45)
ERYTHROCYTE [DISTWIDTH] IN BLOOD BY AUTOMATED COUNT: 14.2 % (ref 11.5–14.5)
GFR SERPL CREATININE-BSD FRML MDRD: > 60 ML/MIN/1.73M2
GLUCOSE BLD STRIP.AUTO-MCNC: 150 MG/DL (ref 70–108)
GLUCOSE BLD STRIP.AUTO-MCNC: 190 MG/DL (ref 70–108)
GLUCOSE BLD STRIP.AUTO-MCNC: 208 MG/DL (ref 70–108)
GLUCOSE BLD STRIP.AUTO-MCNC: 227 MG/DL (ref 70–108)
GLUCOSE SERPL-MCNC: 143 MG/DL (ref 70–108)
HCT VFR BLD AUTO: 37.5 % (ref 42–52)
HGB BLD-MCNC: 11.5 GM/DL (ref 14–18)
MAGNESIUM SERPL-MCNC: 2 MG/DL (ref 1.6–2.4)
MCH RBC QN AUTO: 28.4 PG (ref 26–33)
MCHC RBC AUTO-ENTMCNC: 30.7 GM/DL (ref 32.2–35.5)
MCV RBC AUTO: 92.6 FL (ref 80–94)
PLATELET # BLD AUTO: 167 THOU/MM3 (ref 130–400)
PMV BLD AUTO: 11 FL (ref 9.4–12.4)
POTASSIUM SERPL-SCNC: 3.6 MEQ/L (ref 3.5–5.2)
RBC # BLD AUTO: 4.05 MILL/MM3 (ref 4.7–6.1)
SODIUM SERPL-SCNC: 137 MEQ/L (ref 135–145)
WBC # BLD AUTO: 5.7 THOU/MM3 (ref 4.8–10.8)

## 2023-04-13 PROCEDURE — 6370000000 HC RX 637 (ALT 250 FOR IP)

## 2023-04-13 PROCEDURE — 2580000003 HC RX 258

## 2023-04-13 PROCEDURE — 99232 SBSQ HOSP IP/OBS MODERATE 35: CPT | Performed by: INTERNAL MEDICINE

## 2023-04-13 PROCEDURE — 99254 IP/OBS CNSLTJ NEW/EST MOD 60: CPT | Performed by: UROLOGY

## 2023-04-13 PROCEDURE — 80048 BASIC METABOLIC PNL TOTAL CA: CPT

## 2023-04-13 PROCEDURE — 36415 COLL VENOUS BLD VENIPUNCTURE: CPT

## 2023-04-13 PROCEDURE — 1200000000 HC SEMI PRIVATE

## 2023-04-13 PROCEDURE — 6370000000 HC RX 637 (ALT 250 FOR IP): Performed by: STUDENT IN AN ORGANIZED HEALTH CARE EDUCATION/TRAINING PROGRAM

## 2023-04-13 PROCEDURE — 82948 REAGENT STRIP/BLOOD GLUCOSE: CPT

## 2023-04-13 PROCEDURE — 6360000002 HC RX W HCPCS

## 2023-04-13 PROCEDURE — 83735 ASSAY OF MAGNESIUM: CPT

## 2023-04-13 PROCEDURE — 51702 INSERT TEMP BLADDER CATH: CPT

## 2023-04-13 PROCEDURE — 6370000000 HC RX 637 (ALT 250 FOR IP): Performed by: INTERNAL MEDICINE

## 2023-04-13 PROCEDURE — 97530 THERAPEUTIC ACTIVITIES: CPT

## 2023-04-13 PROCEDURE — 51798 US URINE CAPACITY MEASURE: CPT

## 2023-04-13 PROCEDURE — 85027 COMPLETE CBC AUTOMATED: CPT

## 2023-04-13 RX ORDER — INSULIN LISPRO 100 [IU]/ML
0-8 INJECTION, SOLUTION INTRAVENOUS; SUBCUTANEOUS
Status: DISCONTINUED | OUTPATIENT
Start: 2023-04-14 | End: 2023-04-14 | Stop reason: HOSPADM

## 2023-04-13 RX ORDER — LIDOCAINE HYDROCHLORIDE 20 MG/ML
JELLY TOPICAL PRN
Status: DISCONTINUED | OUTPATIENT
Start: 2023-04-13 | End: 2023-04-14 | Stop reason: HOSPADM

## 2023-04-13 RX ORDER — INSULIN LISPRO 100 [IU]/ML
20 INJECTION, SOLUTION INTRAVENOUS; SUBCUTANEOUS
Status: DISCONTINUED | OUTPATIENT
Start: 2023-04-14 | End: 2023-04-14 | Stop reason: HOSPADM

## 2023-04-13 RX ORDER — INSULIN LISPRO 100 [IU]/ML
0-4 INJECTION, SOLUTION INTRAVENOUS; SUBCUTANEOUS NIGHTLY
Status: DISCONTINUED | OUTPATIENT
Start: 2023-04-13 | End: 2023-04-14 | Stop reason: HOSPADM

## 2023-04-13 RX ORDER — BISACODYL 10 MG
10 SUPPOSITORY, RECTAL RECTAL ONCE
Status: DISCONTINUED | OUTPATIENT
Start: 2023-04-13 | End: 2023-04-14 | Stop reason: HOSPADM

## 2023-04-13 RX ORDER — LIDOCAINE HYDROCHLORIDE 20 MG/ML
JELLY TOPICAL PRN
Status: DISCONTINUED | OUTPATIENT
Start: 2023-04-13 | End: 2023-04-13 | Stop reason: SDUPTHER

## 2023-04-13 RX ADMIN — MICONAZOLE NITRATE: 20 POWDER TOPICAL at 21:43

## 2023-04-13 RX ADMIN — FERROUS SULFATE TAB 325 MG (65 MG ELEMENTAL FE) 325 MG: 325 (65 FE) TAB at 17:07

## 2023-04-13 RX ADMIN — SODIUM CHLORIDE, PRESERVATIVE FREE 10 ML: 5 INJECTION INTRAVENOUS at 08:50

## 2023-04-13 RX ADMIN — HYDRALAZINE HYDROCHLORIDE 100 MG: 50 TABLET, FILM COATED ORAL at 21:43

## 2023-04-13 RX ADMIN — FERROUS SULFATE TAB 325 MG (65 MG ELEMENTAL FE) 325 MG: 325 (65 FE) TAB at 08:49

## 2023-04-13 RX ADMIN — HYDRALAZINE HYDROCHLORIDE 100 MG: 50 TABLET, FILM COATED ORAL at 06:41

## 2023-04-13 RX ADMIN — SERTRALINE 50 MG: 50 TABLET, FILM COATED ORAL at 08:50

## 2023-04-13 RX ADMIN — GABAPENTIN 300 MG: 300 CAPSULE ORAL at 14:24

## 2023-04-13 RX ADMIN — ENOXAPARIN SODIUM 40 MG: 100 INJECTION SUBCUTANEOUS at 08:50

## 2023-04-13 RX ADMIN — GABAPENTIN 300 MG: 300 CAPSULE ORAL at 21:43

## 2023-04-13 RX ADMIN — INSULIN GLARGINE 30 UNITS: 100 INJECTION, SOLUTION SUBCUTANEOUS at 08:51

## 2023-04-13 RX ADMIN — LEVETIRACETAM 500 MG: 500 TABLET, FILM COATED ORAL at 08:49

## 2023-04-13 RX ADMIN — INSULIN LISPRO 10 UNITS: 100 INJECTION, SOLUTION INTRAVENOUS; SUBCUTANEOUS at 12:12

## 2023-04-13 RX ADMIN — METOPROLOL TARTRATE 12.5 MG: 25 TABLET, FILM COATED ORAL at 08:50

## 2023-04-13 RX ADMIN — PANTOPRAZOLE SODIUM 40 MG: 40 TABLET, DELAYED RELEASE ORAL at 06:46

## 2023-04-13 RX ADMIN — INSULIN LISPRO 4 UNITS: 100 INJECTION, SOLUTION INTRAVENOUS; SUBCUTANEOUS at 17:07

## 2023-04-13 RX ADMIN — GABAPENTIN 300 MG: 300 CAPSULE ORAL at 08:50

## 2023-04-13 RX ADMIN — MICONAZOLE NITRATE: 20 POWDER TOPICAL at 08:51

## 2023-04-13 RX ADMIN — ENOXAPARIN SODIUM 40 MG: 100 INJECTION SUBCUTANEOUS at 21:43

## 2023-04-13 RX ADMIN — AMLODIPINE BESYLATE 10 MG: 10 TABLET ORAL at 08:50

## 2023-04-13 RX ADMIN — TAMSULOSIN HYDROCHLORIDE 0.4 MG: 0.4 CAPSULE ORAL at 08:49

## 2023-04-13 RX ADMIN — CEFEPIME 2000 MG: 2 INJECTION, POWDER, FOR SOLUTION INTRAVENOUS at 02:23

## 2023-04-13 RX ADMIN — ATORVASTATIN CALCIUM 80 MG: 80 TABLET, FILM COATED ORAL at 21:43

## 2023-04-13 RX ADMIN — LEVETIRACETAM 500 MG: 500 TABLET, FILM COATED ORAL at 21:43

## 2023-04-13 RX ADMIN — ASPIRIN 81 MG: 81 TABLET, COATED ORAL at 08:49

## 2023-04-13 RX ADMIN — HYDRALAZINE HYDROCHLORIDE 100 MG: 50 TABLET, FILM COATED ORAL at 14:23

## 2023-04-13 RX ADMIN — INSULIN LISPRO 10 UNITS: 100 INJECTION, SOLUTION INTRAVENOUS; SUBCUTANEOUS at 17:07

## 2023-04-13 RX ADMIN — METOPROLOL TARTRATE 12.5 MG: 25 TABLET, FILM COATED ORAL at 21:43

## 2023-04-13 RX ADMIN — INSULIN LISPRO 10 UNITS: 100 INJECTION, SOLUTION INTRAVENOUS; SUBCUTANEOUS at 08:51

## 2023-04-13 RX ADMIN — INSULIN GLARGINE 30 UNITS: 100 INJECTION, SOLUTION SUBCUTANEOUS at 21:46

## 2023-04-13 RX ADMIN — CEFEPIME 2000 MG: 2 INJECTION, POWDER, FOR SOLUTION INTRAVENOUS at 14:25

## 2023-04-13 ASSESSMENT — PAIN SCALES - GENERAL: PAINLEVEL_OUTOF10: 0

## 2023-04-13 NOTE — PROCEDURES
Bladder scan completed at 0030 and results told to [facility]. Scan showed 437 mL in the patients bladder. Last void was unknown.  Dav Benson CET

## 2023-04-13 NOTE — PROCEDURES
Bladder scan completed at 0655 and results told to [facility]. Scan showed 467 mL in the patients bladder. Last void was unknown.  [facility] CET

## 2023-04-13 NOTE — FLOWSHEET NOTE
04/13/23 0239   Treatment Team Notification   Reason for Communication Change in status  (Rn unable to insert rothman catheter)   Team Member Name Ananya Mead   Treatment Team Role Attending Provider   Method of Communication Secure Message   Response En route   Notification Time 2236     RN attempted to insert Coude rothman catheter sizes 18 and 16 but due to the patients anatomy it was unsuccessful.

## 2023-04-13 NOTE — FLOWSHEET NOTE
Sushil Denson 61  PHYSICAL THERAPY MISSED TREATMENT NOTE  [facility] ON[facility] MED 5K    Date: 2023  Patient Name: Itzel Nowak        MRN: [id number]   : 1955  (78 y.o.)  Gender: male   Referring Practitioner: Malcom Moreira PA-C  Diagnosis: UTI (urinary tract infection)         REASON FOR MISSED TREATMENT:  Missed Treat. Attempted at 65, RN approved session. Patient in recliner on the phone upon arrival and declined physical therapy session at this time. Will try back later as time allows.

## 2023-04-13 NOTE — CARE COORDINATION
4/13/23, 9:07 AM EDT    DISCHARGE PLANNING EVALUATION    Call to Shaun Gutierrez with [facility] to check on precert, left a voicemail for a call back. Call to pt's insurance, they report no request is placed for SNF. Call to Shaun Gutierrez again with [facility], left a voicemail for a call back. 9:23 AM EDT    Message from Shaun Gutierrez with [facility], reports precert was submitted and has confirmation on this, reports she will check with insurance and get back with SW.     11:19 AM EDT  Spoke with Shaun Gutierrez with [facility], she did talk with pt's insurance, reports they are anticipating they will have results of precert this afternoon.

## 2023-04-14 VITALS
HEIGHT: 70 IN | BODY MASS INDEX: 45.1 KG/M2 | SYSTOLIC BLOOD PRESSURE: 123 MMHG | TEMPERATURE: 98.8 F | OXYGEN SATURATION: 99 % | WEIGHT: 315 LBS | HEART RATE: 75 BPM | RESPIRATION RATE: 16 BRPM | DIASTOLIC BLOOD PRESSURE: 60 MMHG

## 2023-04-14 PROBLEM — F51.9 SEVERE MORNING SLEEP INERTIA: Status: ACTIVE | Noted: 2023-04-14

## 2023-04-14 PROBLEM — N39.0 UTI (URINARY TRACT INFECTION): Status: RESOLVED | Noted: 2023-04-06 | Resolved: 2023-04-14

## 2023-04-14 PROBLEM — R06.83 SNORING: Status: ACTIVE | Noted: 2023-04-14

## 2023-04-14 PROBLEM — G47.19 EXCESSIVE DAYTIME SLEEPINESS: Status: ACTIVE | Noted: 2023-04-14

## 2023-04-14 PROBLEM — G24.01 TARDIVE DYSKINESIA: Status: ACTIVE | Noted: 2023-04-14

## 2023-04-14 PROBLEM — L89.90 PRESSURE INJURY OF SKIN: Status: ACTIVE | Noted: 2023-04-14

## 2023-04-14 PROBLEM — N39.0 URINARY TRACT INFECTION: Status: RESOLVED | Noted: 2023-04-08 | Resolved: 2023-04-14

## 2023-04-14 PROBLEM — R06.81 WITNESSED EPISODE OF APNEA: Status: ACTIVE | Noted: 2023-04-14

## 2023-04-14 PROBLEM — E66.2 OBESITY HYPOVENTILATION SYNDROME (HCC): Status: ACTIVE | Noted: 2023-04-14

## 2023-04-14 LAB
ALBUMIN SERPL BCG-MCNC: 3.4 G/DL (ref 3.5–5.1)
ALP SERPL-CCNC: 78 U/L (ref 38–126)
ALT SERPL W/O P-5'-P-CCNC: 24 U/L (ref 11–66)
ANION GAP SERPL CALC-SCNC: 9 MEQ/L (ref 8–16)
AST SERPL-CCNC: 25 U/L (ref 5–40)
BILIRUB SERPL-MCNC: 0.2 MG/DL (ref 0.3–1.2)
BUN SERPL-MCNC: 30 MG/DL (ref 7–22)
CALCIUM SERPL-MCNC: 8.7 MG/DL (ref 8.5–10.5)
CHLORIDE SERPL-SCNC: 109 MEQ/L (ref 98–111)
CO2 SERPL-SCNC: 22 MEQ/L (ref 23–33)
CREAT SERPL-MCNC: 1.2 MG/DL (ref 0.4–1.2)
DEPRECATED RDW RBC AUTO: 48.5 FL (ref 35–45)
ERYTHROCYTE [DISTWIDTH] IN BLOOD BY AUTOMATED COUNT: 14.4 % (ref 11.5–14.5)
FLUAV RNA RESP QL NAA+PROBE: NOT DETECTED
FLUBV RNA RESP QL NAA+PROBE: NOT DETECTED
GFR SERPL CREATININE-BSD FRML MDRD: > 60 ML/MIN/1.73M2
GLUCOSE BLD STRIP.AUTO-MCNC: 148 MG/DL (ref 70–108)
GLUCOSE BLD STRIP.AUTO-MCNC: 239 MG/DL (ref 70–108)
GLUCOSE SERPL-MCNC: 143 MG/DL (ref 70–108)
HCT VFR BLD AUTO: 37.1 % (ref 42–52)
HGB BLD-MCNC: 11.6 GM/DL (ref 14–18)
MAGNESIUM SERPL-MCNC: 2.2 MG/DL (ref 1.6–2.4)
MCH RBC QN AUTO: 29.1 PG (ref 26–33)
MCHC RBC AUTO-ENTMCNC: 31.3 GM/DL (ref 32.2–35.5)
MCV RBC AUTO: 93 FL (ref 80–94)
PLATELET # BLD AUTO: 156 THOU/MM3 (ref 130–400)
PMV BLD AUTO: 11.2 FL (ref 9.4–12.4)
POTASSIUM SERPL-SCNC: 3.8 MEQ/L (ref 3.5–5.2)
PROT SERPL-MCNC: 5.9 G/DL (ref 6.1–8)
RBC # BLD AUTO: 3.99 MILL/MM3 (ref 4.7–6.1)
SARS-COV-2 RNA RESP QL NAA+PROBE: NOT DETECTED
SODIUM SERPL-SCNC: 140 MEQ/L (ref 135–145)
WBC # BLD AUTO: 6.3 THOU/MM3 (ref 4.8–10.8)

## 2023-04-14 PROCEDURE — 99239 HOSP IP/OBS DSCHRG MGMT >30: CPT | Performed by: INTERNAL MEDICINE

## 2023-04-14 PROCEDURE — 2580000003 HC RX 258

## 2023-04-14 PROCEDURE — 97530 THERAPEUTIC ACTIVITIES: CPT

## 2023-04-14 PROCEDURE — 6370000000 HC RX 637 (ALT 250 FOR IP)

## 2023-04-14 PROCEDURE — 36415 COLL VENOUS BLD VENIPUNCTURE: CPT

## 2023-04-14 PROCEDURE — 6370000000 HC RX 637 (ALT 250 FOR IP): Performed by: INTERNAL MEDICINE

## 2023-04-14 PROCEDURE — 85027 COMPLETE CBC AUTOMATED: CPT

## 2023-04-14 PROCEDURE — 97110 THERAPEUTIC EXERCISES: CPT

## 2023-04-14 PROCEDURE — 6370000000 HC RX 637 (ALT 250 FOR IP): Performed by: STUDENT IN AN ORGANIZED HEALTH CARE EDUCATION/TRAINING PROGRAM

## 2023-04-14 PROCEDURE — 6360000002 HC RX W HCPCS

## 2023-04-14 PROCEDURE — 83735 ASSAY OF MAGNESIUM: CPT

## 2023-04-14 PROCEDURE — 80053 COMPREHEN METABOLIC PANEL: CPT

## 2023-04-14 PROCEDURE — 82948 REAGENT STRIP/BLOOD GLUCOSE: CPT

## 2023-04-14 PROCEDURE — 87636 SARSCOV2 & INF A&B AMP PRB: CPT

## 2023-04-14 RX ORDER — INSULIN LISPRO 100 [IU]/ML
0-4 INJECTION, SOLUTION INTRAVENOUS; SUBCUTANEOUS NIGHTLY
DISCHARGE
Start: 2023-04-14

## 2023-04-14 RX ORDER — INSULIN LISPRO 100 [IU]/ML
0-8 INJECTION, SOLUTION INTRAVENOUS; SUBCUTANEOUS
DISCHARGE
Start: 2023-04-14

## 2023-04-14 RX ORDER — BISACODYL 10 MG
10 SUPPOSITORY, RECTAL RECTAL DAILY PRN
Qty: 1 SUPPOSITORY | Refills: 0 | DISCHARGE
Start: 2023-04-14 | End: 2023-05-14

## 2023-04-14 RX ORDER — POLYETHYLENE GLYCOL 3350 17 G/17G
17 POWDER, FOR SOLUTION ORAL DAILY PRN
Qty: 527 G | Refills: 1 | DISCHARGE
Start: 2023-04-14 | End: 2023-05-14

## 2023-04-14 RX ORDER — INSULIN LISPRO 100 [IU]/ML
20 INJECTION, SOLUTION INTRAVENOUS; SUBCUTANEOUS
DISCHARGE
Start: 2023-04-14

## 2023-04-14 RX ORDER — LIDOCAINE HYDROCHLORIDE 20 MG/ML
JELLY TOPICAL PRN
DISCHARGE
Start: 2023-04-14

## 2023-04-14 RX ORDER — INSULIN GLARGINE 100 [IU]/ML
30 INJECTION, SOLUTION SUBCUTANEOUS 2 TIMES DAILY
Qty: 10 ML | Refills: 3 | DISCHARGE
Start: 2023-04-14

## 2023-04-14 RX ORDER — LEVETIRACETAM 500 MG/1
500 TABLET ORAL 2 TIMES DAILY
Qty: 60 TABLET | Refills: 3 | DISCHARGE
Start: 2023-04-14

## 2023-04-14 RX ORDER — PANTOPRAZOLE SODIUM 40 MG/1
40 TABLET, DELAYED RELEASE ORAL
Qty: 30 TABLET | Refills: 3 | DISCHARGE
Start: 2023-04-15

## 2023-04-14 RX ORDER — TAMSULOSIN HYDROCHLORIDE 0.4 MG/1
0.4 CAPSULE ORAL DAILY
Qty: 30 CAPSULE | Refills: 3 | DISCHARGE
Start: 2023-04-15

## 2023-04-14 RX ORDER — HYDRALAZINE HYDROCHLORIDE 100 MG/1
100 TABLET, FILM COATED ORAL EVERY 8 HOURS SCHEDULED
Qty: 90 TABLET | Refills: 3 | DISCHARGE
Start: 2023-04-14

## 2023-04-14 RX ADMIN — SERTRALINE 50 MG: 50 TABLET, FILM COATED ORAL at 09:20

## 2023-04-14 RX ADMIN — ENOXAPARIN SODIUM 40 MG: 100 INJECTION SUBCUTANEOUS at 09:21

## 2023-04-14 RX ADMIN — INSULIN LISPRO 3 UNITS: 100 INJECTION, SOLUTION INTRAVENOUS; SUBCUTANEOUS at 12:15

## 2023-04-14 RX ADMIN — ASPIRIN 81 MG: 81 TABLET, COATED ORAL at 09:20

## 2023-04-14 RX ADMIN — GABAPENTIN 300 MG: 300 CAPSULE ORAL at 09:21

## 2023-04-14 RX ADMIN — FERROUS SULFATE TAB 325 MG (65 MG ELEMENTAL FE) 325 MG: 325 (65 FE) TAB at 09:20

## 2023-04-14 RX ADMIN — CEFEPIME 2000 MG: 2 INJECTION, POWDER, FOR SOLUTION INTRAVENOUS at 13:38

## 2023-04-14 RX ADMIN — PANTOPRAZOLE SODIUM 40 MG: 40 TABLET, DELAYED RELEASE ORAL at 05:33

## 2023-04-14 RX ADMIN — SODIUM CHLORIDE, PRESERVATIVE FREE 10 ML: 5 INJECTION INTRAVENOUS at 09:22

## 2023-04-14 RX ADMIN — TAMSULOSIN HYDROCHLORIDE 0.4 MG: 0.4 CAPSULE ORAL at 09:20

## 2023-04-14 RX ADMIN — INSULIN LISPRO 20 UNITS: 100 INJECTION, SOLUTION INTRAVENOUS; SUBCUTANEOUS at 09:24

## 2023-04-14 RX ADMIN — METOPROLOL TARTRATE 12.5 MG: 25 TABLET, FILM COATED ORAL at 07:56

## 2023-04-14 RX ADMIN — CEFEPIME 2000 MG: 2 INJECTION, POWDER, FOR SOLUTION INTRAVENOUS at 01:34

## 2023-04-14 RX ADMIN — INSULIN LISPRO 20 UNITS: 100 INJECTION, SOLUTION INTRAVENOUS; SUBCUTANEOUS at 12:16

## 2023-04-14 RX ADMIN — AMLODIPINE BESYLATE 10 MG: 10 TABLET ORAL at 07:56

## 2023-04-14 RX ADMIN — INSULIN GLARGINE 30 UNITS: 100 INJECTION, SOLUTION SUBCUTANEOUS at 09:24

## 2023-04-14 RX ADMIN — MICONAZOLE NITRATE: 20 POWDER TOPICAL at 09:21

## 2023-04-14 RX ADMIN — HYDRALAZINE HYDROCHLORIDE 100 MG: 50 TABLET, FILM COATED ORAL at 05:33

## 2023-04-14 RX ADMIN — LEVETIRACETAM 500 MG: 500 TABLET, FILM COATED ORAL at 09:22

## 2023-04-14 ASSESSMENT — PAIN SCALES - GENERAL
PAINLEVEL_OUTOF10: 0

## 2023-04-14 NOTE — PLAN OF CARE
Problem: Safety - Adult  Goal: Free from fall injury  4/11/2023 1629 by Saniya Jett RN  Flowsheets (Taken 4/11/2023 1629)  Free From Fall Injury: Instruct family/caregiver on patient safety  All fall precautions in place. Bed in low position, alarm activated and appropriate use of call light. Problem: Skin/Tissue Integrity  Goal: Absence of new skin breakdown  Description: 1. Monitor for areas of redness and/or skin breakdown  2. Assess vascular access sites hourly  3. Every 4-6 hours minimum:  Change oxygen saturation probe site  4. Every 4-6 hours:  If on nasal continuous positive airway pressure, respiratory therapy assess nares and determine need for appliance change or resting period. 4/11/2023 2327 by Shandra Middleton RN  Outcome: Progressing   Skin assessment completed. Patient turned every 2 hours and as needed. No skin breakdown this shift. Problem: ABCDS Injury Assessment  Goal: Absence of physical injury  4/11/2023 2327 by Shandra Middleton RN  Outcome: Progressing     Problem: Discharge Planning  Goal: Discharge to home or other facility with appropriate resources  4/11/2023 2327 by Shandra Middleton RN  Outcome: Progressing     Problem: Pain  Goal: Verbalizes/displays adequate comfort level or baseline comfort level  4/11/2023 2327 by Shandra Middleton RN  Outcome: Progressing  Pain Assessment: None - Denies Pain          Is pain goal met at this time?   NA            Problem: Genitourinary - Adult  Goal: Absence of urinary retention  4/11/2023 2327 by Shandra Middleton RN  Outcome: Progressing     Problem: Infection - Adult  Goal: Absence of infection at discharge  4/11/2023 2327 by Shandra Middleton RN  Outcome: Progressing     Problem: Infection - Adult  Goal: Absence of infection during hospitalization  4/11/2023 2327 by Shandra Middleton RN  Outcome: Progressing     Problem: Respiratory - Adult  Goal: Achieves optimal ventilation and oxygenation  4/11/2023 2327 by Shandra Middleton RN  Outcome:
Problem: Safety - Adult  Goal: Free from fall injury  Outcome: Progressing     Problem: Skin/Tissue Integrity  Goal: Absence of new skin breakdown  Description: 1. Monitor for areas of redness and/or skin breakdown  2. Assess vascular access sites hourly  3. Every 4-6 hours minimum:  Change oxygen saturation probe site  4. Every 4-6 hours:  If on nasal continuous positive airway pressure, respiratory therapy assess nares and determine need for appliance change or resting period.   Outcome: Progressing     Problem: ABCDS Injury Assessment  Goal: Absence of physical injury  Outcome: Progressing  Flowsheets (Taken 4/10/2023 1143)  Absence of Physical Injury: Implement safety measures based on patient assessment     Problem: Discharge Planning  Goal: Discharge to home or other facility with appropriate resources  Outcome: Progressing     Problem: Pain  Goal: Verbalizes/displays adequate comfort level or baseline comfort level  Outcome: Progressing     Problem: Genitourinary - Adult  Goal: Absence of urinary retention  Outcome: Progressing  Flowsheets (Taken 4/10/2023 0800)  Absence of urinary retention:   Assess patients ability to void and empty bladder   Monitor intake/output and perform bladder scan as needed     Problem: Infection - Adult  Goal: Absence of infection at discharge  Outcome: Progressing  Flowsheets (Taken 4/10/2023 1143)  Absence of infection at discharge:   Assess and monitor for signs and symptoms of infection   Monitor lab/diagnostic results  Goal: Absence of infection during hospitalization  Outcome: Progressing  Flowsheets (Taken 4/10/2023 1143)  Absence of infection during hospitalization:   Assess and monitor for signs and symptoms of infection   Monitor lab/diagnostic results     Problem: Chronic Conditions and Co-morbidities  Goal: Patient's chronic conditions and co-morbidity symptoms are monitored and maintained or improved  Outcome: Progressing     Problem: Respiratory - Adult  Goal:
Problem: Safety - Adult  Goal: Free from fall injury  Outcome: Progressing   All fall precautions in place. Bed in low position, alarm activated and appropriate use of call light. Problem: Skin/Tissue Integrity  Goal: Absence of new skin breakdown  Description: 1. Monitor for areas of redness and/or skin breakdown  2. Assess vascular access sites hourly  3. Every 4-6 hours minimum:  Change oxygen saturation probe site  4. Every 4-6 hours:  If on nasal continuous positive airway pressure, respiratory therapy assess nares and determine need for appliance change or resting period. Outcome: Progressing  No skin breakdown this shift. Patient being assisted with turning. Patients states understanding of repositioning every two hours.      Problem: Discharge Planning  Goal: Discharge to home or other facility with appropriate resources  Outcome: Progressing  Flowsheets (Taken 4/12/2023 1000)  Discharge to home or other facility with appropriate resources: Identify barriers to discharge with patient and caregiver     Problem: Pain  Goal: Verbalizes/displays adequate comfort level or baseline comfort level  Outcome: Progressing  Flowsheets (Taken 4/12/2023 1000)  Verbalizes/displays adequate comfort level or baseline comfort level:   Encourage patient to monitor pain and request assistance   Assess pain using appropriate pain scale   Administer analgesics based on type and severity of pain and evaluate response     Problem: Genitourinary - Adult  Goal: Absence of urinary retention  Outcome: Progressing  Flowsheets (Taken 4/12/2023 1000)  Absence of urinary retention:   Assess patients ability to void and empty bladder   Monitor intake/output and perform bladder scan as needed   Place urinary catheter per Licensed Independent Practitioner order if needed     Problem: Infection - Adult  Goal: Absence of infection at discharge  Outcome: Progressing  Flowsheets (Taken 4/12/2023 1000)  Absence of infection at
Problem: Skin/Tissue Integrity  Goal: Absence of new skin breakdown  Description: 1. Monitor for areas of redness and/or skin breakdown  2. Assess vascular access sites hourly  3. Every 4-6 hours minimum:  Change oxygen saturation probe site  4. Every 4-6 hours:  If on nasal continuous positive airway pressure, respiratory therapy assess nares and determine need for appliance change or resting period.   Outcome: Progressing     Problem: ABCDS Injury Assessment  Goal: Absence of physical injury  Outcome: Progressing  Flowsheets (Taken 4/11/2023 1629)  Absence of Physical Injury: Implement safety measures based on patient assessment     Problem: Discharge Planning  Goal: Discharge to home or other facility with appropriate resources  Outcome: Progressing  Flowsheets (Taken 4/11/2023 1629)  Discharge to home or other facility with appropriate resources:   Identify barriers to discharge with patient and caregiver   Arrange for needed discharge resources and transportation as appropriate   Identify discharge learning needs (meds, wound care, etc)     Problem: Pain  Goal: Verbalizes/displays adequate comfort level or baseline comfort level  Outcome: Progressing  Flowsheets  Taken 4/11/2023 1629 by Butch Prado RN  Verbalizes/displays adequate comfort level or baseline comfort level:   Encourage patient to monitor pain and request assistance   Assess pain using appropriate pain scale   Administer analgesics based on type and severity of pain and evaluate response  Taken 4/11/2023 0415 by Angelia Sanches RN  Verbalizes/displays adequate comfort level or baseline comfort level:   Encourage patient to monitor pain and request assistance   Administer analgesics based on type and severity of pain and evaluate response   Assess pain using appropriate pain scale   Implement non-pharmacological measures as appropriate and evaluate response     Problem: Genitourinary - Adult  Goal: Absence of urinary retention  Outcome:
of urinary retention  Outcome: Progressing  Flowsheets (Taken 4/13/2023 2000)  Absence of urinary retention: Monitor intake/output and perform bladder scan as needed     Problem: Infection - Adult  Goal: Absence of infection at discharge  Outcome: Progressing  Flowsheets (Taken 4/12/2023 2130 by Bladimir Padilla RN)  Absence of infection at discharge:   Assess and monitor for signs and symptoms of infection   Monitor lab/diagnostic results   Monitor all insertion sites i.e., indwelling lines, tubes and drains   Administer medications as ordered   Instruct and encourage patient and family to use good hand hygiene technique  Goal: Absence of infection during hospitalization  Outcome: Progressing  Flowsheets (Taken 4/12/2023 2130 by Bladimir Padilla RN)  Absence of infection during hospitalization:   Assess and monitor for signs and symptoms of infection   Monitor lab/diagnostic results   Monitor all insertion sites i.e., indwelling lines, tubes and drains   Administer medications as ordered   Instruct and encourage patient and family to use good hand hygiene technique     Problem: Chronic Conditions and Co-morbidities  Goal: Patient's chronic conditions and co-morbidity symptoms are monitored and maintained or improved  Outcome: Progressing  Flowsheets (Taken 4/12/2023 2130 by Bladimir Padilla RN)  Care Plan - Patient's Chronic Conditions and Co-Morbidity Symptoms are Monitored and Maintained or Improved:   Monitor and assess patient's chronic conditions and comorbid symptoms for stability, deterioration, or improvement   Collaborate with multidisciplinary team to address chronic and comorbid conditions and prevent exacerbation or deterioration   Update acute care plan with appropriate goals if chronic or comorbid symptoms are exacerbated and prevent overall improvement and discharge     Problem: Respiratory - Adult  Goal: Achieves optimal ventilation and oxygenation  Outcome: Progressing  Flowsheets (Taken
RN  Outcome: Progressing  Flowsheets (Taken 4/12/2023 1000)  Absence of infection at discharge:   Assess and monitor for signs and symptoms of infection   Monitor lab/diagnostic results   Administer medications as ordered   Instruct and encourage patient and family to use good hand hygiene technique   Identify and instruct in appropriate isolation precautions for identified infection/condition  Goal: Absence of infection during hospitalization  4/13/2023 0009 by Elisha Chaudhry RN  Outcome: Progressing  4 H Cristian Zhang (Taken 4/12/2023 2130)  Absence of infection during hospitalization:   Assess and monitor for signs and symptoms of infection   Monitor lab/diagnostic results   Monitor all insertion sites i.e., indwelling lines, tubes and drains   Administer medications as ordered   Instruct and encourage patient and family to use good hand hygiene technique  4/12/2023 1333 by Wilder Triana RN  Outcome: Progressing  Flowsheets (Taken 4/12/2023 1000)  Absence of infection during hospitalization:   Assess and monitor for signs and symptoms of infection   Monitor lab/diagnostic results   Monitor all insertion sites i.e., indwelling lines, tubes and drains   Administer medications as ordered   Instruct and encourage patient and family to use good hand hygiene technique   Identify and instruct in appropriate isolation precautions for identified infection/condition     Problem: Respiratory - Adult  Goal: Achieves optimal ventilation and oxygenation  4/13/2023 0009 by Elisha Chaudhry RN  Outcome: Progressing  4 H Cristian Zhang (Taken 4/12/2023 2130)  Achieves optimal ventilation and oxygenation:   Assess for changes in respiratory status   Assess for changes in mentation and behavior   Position to facilitate oxygenation and minimize respiratory effort   Assess and instruct to report shortness of breath or any respiratory difficulty  4/12/2023 1333 by Wilder Triana RN  Outcome: Progressing  Flowsheets (Taken 4/12/2023

## 2023-04-14 NOTE — DISCHARGE SUMMARY
daily     metoprolol tartrate 25 MG tablet  Commonly known as: LOPRESSOR  Take 1 tablet by mouth 2 times daily     miconazole 2 % powder  Commonly known as: MICOTIN  Apply topically 2 times daily. sertraline 50 MG tablet  Commonly known as: ZOLOFT            STOP taking these medications      alogliptin 25 MG Tabs tablet  Commonly known as: NESINA     furosemide 40 MG tablet  Commonly known as: Lasix     lansoprazole 15 MG delayed release capsule  Commonly known as: PREVACID  Replaced by: pantoprazole 40 MG tablet     metFORMIN 500 MG extended release tablet  Commonly known as: GLUCOPHAGE-XR     potassium chloride 20 MEQ extended release tablet  Commonly known as: KLOR-CON M               Where to Get Your Medications        Information about where to get these medications is not yet available    Ask your nurse or doctor about these medications  bisacodyl 10 MG suppository  hydrALAZINE 100 MG tablet  insulin glargine 100 UNIT/ML injection vial  insulin lispro 100 UNIT/ML Soln injection vial  insulin lispro 100 UNIT/ML Soln injection vial  insulin lispro 100 UNIT/ML Soln injection vial  levETIRAcetam 500 MG tablet  lidocaine 2 % uro-jet  pantoprazole 40 MG tablet  polyethylene glycol 17 g packet  tamsulosin 0.4 MG capsule        Time Spent on discharge is 35 minutes in the examination, evaluation, counseling and review of medications and discharge plan. Thank you REI Mcdonough NP (Inactive) for the opportunity to be involved in this patient's care.       Signed:    Electronically signed by Marian Ortez MD on 4/14/23 at 3:06 PM EDT     Case was discussed with Attending, Dr. Gila Guzman

## 2023-04-14 NOTE — PROGRESS NOTES
04/11/23 1145   Encounter Summary   Encounter Overview/Reason  Spiritual/Emotional Needs   Service Provided For: Patient   Referral/Consult From: Rounding; Other    Support System Family members   Last Encounter  04/11/23  (emotional distress)   Complexity of Encounter Moderate   Begin Time 1130   End Time  1145   Total Time Calculated 15 min   Encounter    Type Follow up   Spiritual/Emotional needs   Type Spiritual Support;Emotional Distress   Assessment/Intervention/Outcome   Assessment Anxious; Coping   Intervention Active listening;Nurtured Hope;Prayer (assurance of)/[place];Sustaining Presence/Ministry of presence   Outcome Coping;Comfort     Assessment: In my encounter with the 78 yr old patient, while rounding  the unit 5K,  I provided spiritual care to patient through conversation, I also came to assess the patient's spiritual needs present. The pt stated that he was anxious previously because he was concerned about his current health issues. He was mainly concerned because of the unknowns with test results. His brother Paula Magana is the pt's point of . The pt was admitted due to UTI/ urinary tract infections. Interventions:  I provided prayer, emotional support and words of comfort.  provided a listening presence and encouraged pt to share their beliefs and how they support them during their hospitalization. Outcomes: The patient was encouraged and didn't share any further spiritual needs at this time. Plan:  Chaplains will follow-up at a later time for assessment of any spiritual care needs present.
201 [facility] 5K  Occupational Therapy  Daily Note  Time:   Time In: 3733  Time Out: 1211  Timed Code Treatment Minutes: 23 Minutes  Minutes: 23          Date: 2023  Patient Name: Lauren Solis,   Gender: male      Room: [facility]19/019-A  MRN: [id number]  : 1955  (78 y.o.)  Referring Practitioner: Amparo Zavala PA-C  Diagnosis: UTI  Additional Pertinent Hx: Fredy Genao is a 69-year-old  male with a past medical history of HTN, T2DM who presents to [facility] ED as directed by Adult Protective Services for the evaluation of physical deconditioning and unable to care for self. Patient reports he recently lost his youngest brother about a month ago and has had since worsening difficulty of caring for himself and his twin brother who also lives with him. He reports having bedbugs in his house, and difficulty caring for his hygiene. DX: Complicated UTI, with SHELLEY    Restrictions/Precautions:  Restrictions/Precautions: Up as Tolerated, Fall Risk  Position Activity Restriction  Other position/activity restrictions: bed bugs at home     SUBJECTIVE: Nurse Juliane Steward ok'd session, In bed upon arrival, agreeable to OT session    PAIN:  did not rate    Vitals: Vitals not assessed per clinical judgement, see nursing flowsheet    COGNITION: WFL    ADL:   Footwear Management: Dependent. For B slipper socks . BALANCE:  Partial stand completed with aides to be able to clean buttocks    BED MOBILITY:  Supine to Sit: Stand By Assistance HOB elevated, used bedrail and increased time    TRANSFERS:  Stand Pivot: Maximum Assistance, X 2. EOB to chair      ASSESSMENT:     Activity Tolerance:  Patient tolerance of  treatment: Good treatment tolerance      Discharge Recommendations: ECF with OT  Equipment Recommendations: Equipment Needed: No  Other: defer to next level of care  Plan: Times Per Week: 3x  Times Per Day:  Once a day  Current Treatment Recommendations: Strengthening, Functional mobility
201 [facility] 5  Occupational Therapy  Daily Note  Time:   Time In: 5736  Time Out: 1526  Timed Code Treatment Minutes: 27 Minutes  Minutes: 27          Date: 2023  Patient Name: Kiki Mills,   Gender: male      Room: [facility]19/019-A  MRN: [id number]  : 1955  (78 y.o.)  Referring Practitioner: Klaudia Hanson PA-C  Diagnosis: UTI  Additional Pertinent Hx: Cookie Marie is a 62-year-old  male with a past medical history of HTN, T2DM who presents to [address] [place] [address] ED as directed by Adult Protective Services for the evaluation of physical deconditioning and unable to care for self. Patient reports he recently lost his youngest brother about a month ago and has had since worsening difficulty of caring for himself and his twin brother who also lives with him. He reports having bedbugs in his house, and difficulty caring for his hygiene. DX: Complicated UTI, with SHELLEY    Restrictions/Precautions:  Restrictions/Precautions: Up as Tolerated, Fall Risk  Position Activity Restriction  Other position/activity restrictions: bed bugs at home     SUBJECTIVE: Pt lying supine upon arrival, agreeable to OT session. RN present requesting assist for up to chair in order to change bed linens as they were saturated in urine. PAIN: Denies    Vitals: Nurse checked vitals prior to session    COGNITION: Slow Processing, Decreased Insight, and Decreased Problem Solving    ADL:   Toileting: Dependent. Pt is reliant on external cath . BALANCE:  Sitting Balance:  Stand By Assistance, [employer]. Pt seated EOB  Standing Balance: Maximum Assistance, X 2. In prep for transfer- x2 attempts     BED MOBILITY:  Supine to Sit: Supervision    Scooting: Supervision EOB    TRANSFERS:  Sit to Stand:  Maximum Assistance, X 1 during first attempt with increased difficulty. [address] x2 for second attempt  Stand to Sit: Moderate Assistance, X 2. To chair  Squat Pivot: Moderate Assistance, X 2.  From EOB to
201 [facility] 5  Occupational Therapy  Daily Note  Time:   Time In:   Time Out: 0815  Timed Code Treatment Minutes: 42 Minutes  Minutes: 42          Date: 4/10/2023  Patient Name: Amado Fernandez,   Gender: male      Room: [facility]19/019-A  MRN: [id number]  : 1955  (78 y.o.)  Referring Practitioner: Yareli Underwood PA-C  Diagnosis: UTI  Additional Pertinent Hx: Beverly Oneill is a 79-year-old  male with a past medical history of HTN, T2DM who presents to [facility] ED as directed by Adult Protective Services for the evaluation of physical deconditioning and unable to care for self. Patient reports he recently lost his youngest brother about a month ago and has had since worsening difficulty of caring for himself and his twin brother who also lives with him. He reports having bedbugs in his house, and difficulty caring for his hygiene. DX: Complicated UTI, with SHELLEY    Restrictions/Precautions:  Restrictions/Precautions: Up as Tolerated, Fall Risk  Position Activity Restriction  Other position/activity restrictions: bed bugs at home     SUBJECTIVE: Pt. RN okayed OT session. Pt. In room and agreeable to participate pleasant and cooperative throughout. Pt. Lowered to the floor slid from recliner to floor student nurse present, RN notified and present and staff assisted with maxi move to transfer Pt. From floor to the bed. Safe care completed. Pt. Reports no injury. PAIN: Denies    Vitals: Vitals not assessed per clinical judgement, see nursing flowsheet    COGNITION: WFL    ADL:   Grooming: Stand By Assistance and with set-up. Completed seated on EOB  Bathing: Stand By Assistance and with set-up. Pt. Rolled side to side to complete gabriella care. Pt. Has red area in L groin down in between skin fold with open spots and some bleeding RN notified and aware  Upper Extremity Dressing: Stand By Assistance. To don and doff shirt  Footwear Management: Dependent.   To don and doff
[facility] MISSED TREATMENT NOTE  [facility] 5K  5K-19/019-A      Date: 2023  Patient Name: Catie Cabrales        CSN: [id number]   : 1955  (78 y.o.)  Gender: male   Referring Practitioner: Avani Wilks PA-C  Diagnosis: UTI         REASON FOR MISSED TREATMENT:  Pt resting in bed upon arrival and did arouse to therapists voice, however, requesting therapist to return to allow him to eat breakfast and \"wake up\" prior to therapy. Will re attempt as time allows.
[address] [place] [address]  INPATIENT PHYSICAL THERAPY  DAILY NOTE  [facility] 5K - 6H-87/138-Y    Time In: 5028  Time Out: 1020  Timed Code Treatment Minutes: 29 Minutes  Minutes: 29          Date: 2023  Patient Name: John Nava,  Gender:  male        MRN: [id number]  : 1955  (78 y.o.)     Referring Practitioner: Maddi Adamson PA-C  Diagnosis: UTI (urinary tract infection)  Additional Pertinent Hx: Shandra Middleton is a 81-year-old  male with a past medical history of HTN, T2DM who presents to [facility] ED as directed by Adult Protective Services for the evaluation of physical deconditioning and unable to care for self. Patient reports he recently lost his youngest brother about a month ago and has had since worsening difficulty of caring for himself and his twin brother who also lives with him. He reports having bedbugs in his house, and difficulty caring for his hygiene. He denies any pain or chief complaint today. He did not denies any fever, chills, recent illness, chest pain, shortness of breath. Patient reports some urinary urgency and frequency worsened from his baseline as of late. He denies any recent medication changes, and reports medication compliance. Patient is interested in [facility] placement with his twin brother. Prior Level of Function:  Lives With: Family (Lives with brother nad nephew)  Home Equipment: Symone heller        Receives Help From: Family  ADL Assistance: Needs assistance  Homemaking Assistance: Needs assistance  Homemaking Responsibilities: No  Ambulation Assistance: Non-ambulatory  Transfer Assistance: Needs assistance  Active : No  Additional Comments: Pt plans to transition to SNF and LTC after this hospitalizaiton due to increased difficulty caring for himself at home.     Restrictions/Precautions:  Restrictions/Precautions: Up as Tolerated, Fall Risk  Position Activity Restriction  Other position/activity restrictions: bed bugs
[address] [place] [address]  INPATIENT PHYSICAL THERAPY  DAILY NOTE  [facility] MED 5K - 7N-17/460-B    Time In: 2358  Time Out: 1142  Timed Code Treatment Minutes: 23 Minutes  Minutes: 23          Date: 4/10/2023  Patient Name: Delma Overton,  Gender:  male        MRN: [id number]  : 1955  (78 y.o.)     Referring Practitioner: Lai Parker PA-C  Diagnosis: UTI (urinary tract infection)  Additional Pertinent Hx: Grey Garrido is a 70-year-old  male with a past medical history of HTN, T2DM who presents to [facility] ED as directed by Adult Protective Services for the evaluation of physical deconditioning and unable to care for self. Patient reports he recently lost his youngest brother about a month ago and has had since worsening difficulty of caring for himself and his twin brother who also lives with him. He reports having bedbugs in his house, and difficulty caring for his hygiene. He denies any pain or chief complaint today. He did not denies any fever, chills, recent illness, chest pain, shortness of breath. Patient reports some urinary urgency and frequency worsened from his baseline as of late. He denies any recent medication changes, and reports medication compliance. Patient is interested in [facility] placement with his twin brother. Prior Level of Function:  Lives With: Family (Lives with brother nad nephew)  Home Equipment: Ras heller        Receives Help From: Family  ADL Assistance: Needs assistance  Homemaking Assistance: Needs assistance  Homemaking Responsibilities: No  Ambulation Assistance: Non-ambulatory  Transfer Assistance: Needs assistance  Active : No  Additional Comments: Pt plans to transition to SNF and LTC after this hospitalizaiton due to increased difficulty caring for himself at home.     Restrictions/Precautions:  Restrictions/Precautions: Up as Tolerated, Fall Risk  Position Activity Restriction  Other position/activity restrictions: bed bugs
CLINICAL PHARMACY: DISCHARGE MED RECONCILIATION/REVIEW    [facility]) Select Patient?: No  Total # of Interventions Recommended: 0   -   Total # Interventions Accepted: 0  Intervention Severity:   - Level 1 Intervention Present?: No   - Level 2 #: 0   - Level 3 #: 0   Time Spent (min): 15    Additional Documentation:
Comprehensive Nutrition Assessment    Type and Reason for Visit:  Reassess    Nutrition Recommendations/Plan:   Continue diet as ordered and encourage PO intake as tolerated while implementing DM recommendations. ONS ordered: Anjel BID to promote wound healing   Monitoring all aspects of nutrition and will provide further nutrition recommendations as necessary and appropriate. Malnutrition Assessment:  Malnutrition Status:  No malnutrition (04/07/23 1218)    Context:  Chronic Illness     Findings of the 6 clinical characteristics of malnutrition:  Energy Intake:  No significant decrease in energy intake  Weight Loss:  No significant weight loss     Body Fat Loss:  No significant body fat loss     Muscle Mass Loss:  No significant muscle mass loss    Fluid Accumulation:  Unable to assess     Strength:  Not Performed    Nutrition Assessment:     Pt. nutritionally compromised AEB wounds. At risk for further nutrition compromise r/t increased nutrient needs for wound healing, admit d/t physical deconditioning-difficulty caring for himself, UTI underlying medical condition (hx depression, gout, HTN, DM). Nutrition Related Findings:    Pt. Report/Treatments/Miscellaneous: spoke with patient at bedside this morning. Pt reports appetite okay~ reports drinking anjel. Encouraged compliance with DM diet recommendations. Pt reports he was working on portioning out sizes prior to being admitted. Planning discharge to [facility] with his twin brother who is also admitted to [facility] as well. GI Status: last BM 4/9  Pertinent Labs: Na 137, K 3.6, BUN 37, creatinine 1.1, glucose 143, A1C 7.8 (4/7/23), hgb 11.5  Pertinent Meds: iron, keppra, protonix      Wound Type: Stage II (buttocks)       Current Nutrition Intake & Therapies:    Average Meal Intake: %  Average Supplements Intake:  (pt reports intake- takes two at one time)  ADULT ORAL NUTRITION SUPPLEMENT; Breakfast, Dinner;  Wound Healing Oral Supplement  ADULT DIET;
Consult for Emotional Distress. This staff encountered Simba Bolanos in his [address] [facility] room. He was watching a Star Wars movie while on the phone. He explained his twin brother / roommate Sarita Waters is also admitted and this is how they were watching the movie together. He expressed that he wanted  to stay and he would call back his brother. He shared that except for a brief time when he Grecia Ely) moved in with a friend, the two have always lived together. Simba Bolanos shared that he has not walked in about two years and his brother longer than that. He places the time he stopped walking around the death of their dog, who needed care that required him to get up and move around. He spent some time talking about the \"wiener dog mix\" and how much he meant to both of them. Simba Bolanos uses the words \"low\" and \"depressed\" to describe his feelings at this time. A good part of that is the death of their younger brother about two weeks ago. They also had an older brother die- sounds like both were CHF. It seems this has frightened both men. Neither has children, however they do have contact with their nieces and nephews and a couple of them have been trying to help them. Simba Bolanos laments their physical condition including sores and bed bugs as well as poor condition of their home. \" I don't know how we let it get this bad. But we need help. \" He shared that he slid to the floor today during a transfer and that seemed to reinforce to him how weak he is. They are seeking ECF placement to receive extended physical therapy. Apparently, home therapy is not an option. At one point Akash Chavez would only come for six weeks and we need longer than that. \" Later, he implies and then states outright that they are now housing insecure as their landlord is kicking them out. Simba Bolanos is frightened of being out on the street and even more worried about his brother.  He understands they don't qualify for Medicare and that their options may be
Internal Medicine Resident Progress Note    Patient:  Jean Alexander    YOB: 1955  Unit/Bed:5K-19/019-A  MRN: [id number]    Acct: [de-identified]   PCP: REI Mistry - NP (Inactive)    Date of Admission: 4/6/2023      Assessment/Plan:  Complicated urinary tract infection  -UA showing many bacteria  - Urine culture growing Enterobacter cloacae complex sensitive to gentamicin, cefepime, TMP-SMX, nitrofurantoin  - IV ceftriaxone 04/07 - 04/08; patient switched to cefepime 04/08 with plan to transition to fosfomycin at discharge    S/p controlled mechanical fall  - Patient was attempting to sit in chair and was assisted to the ground by physical therapy.   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  - Judicious diuresis and IV fluid administration  - We will continue to optimize medical therapy for patient's diabetes mellitus  - Continue patient's home lisinopril     Stage II pressure ulcerations, decubitus  - Likely secondary to physical deconditioning  - Wound care recommendations in place  -Continue to offload and turn patient every 4 hours  - Likely complicated by patient's type 2 diabetes mellitus     Type 2 diabetes mellitus, with hyperglycemia, with complication  - Patient maintained on a regimen of long and short acting insulin in the outpatient setting  - Continue glargine 30 units twice daily with high-dose corrective scale  - Patient increased to scheduled 7 units short acting insulin with meals to improve glycemic control  - Point-of-care glucose checks with hypoglycemia protocol in place  - 04/07 HbA1c 7.8  - Continue patient's home gabapentin  - Holding patient's home alogliptin,
Internal Medicine Resident Progress Note    Patient:  Sonal Coleman    YOB: 1955  Unit/Bed:5K-19/019-A  MRN: [id number]    Acct: [de-identified]   PCP: REI Shearer - NP (Inactive)    Date of Admission: 4/6/2023      Assessment/Plan:  Complicated urinary tract infection  - UA showing many bacteria  - Urine culture growing Enterobacter cloacae complex sensitive to gentamicin, cefepime, TMP-SMX, nitrofurantoin  - IV ceftriaxone 04/07 - 04/08; patient switched to cefepime 04/08 with plan to transition to fosfomycin at discharge  - Patient showing evidence of a post-obstructive component for which a rothman catheter was placed on 04/12/2023    Chronic Urinary Retention  - Patient has shown repeat evidence of urinary retention requiring straight catheterization  - He is not tolerating external catheterization  - Rothman catheter placed on 04/12/2023 with initiation of tamsulosin after weighing the risks/benefits/alternatives. Will discontinue pending clinical course      S/p controlled mechanical fall  - Patient was attempting to sit in chair and was assisted to the ground by physical therapy.   - Fall precautions in place  - Continue to monitor for signs and symptoms of bleeding secondary to controlled fall while on anticoagulation      Witnessed Apnea  Excessive Daytime Sleepiness  Increased Neck Circumference  Mallampatti III  -Polysomnography outpatient to assess for obstructive sleep apnea     Intertrigo  - Evidenced in the left abdominal skin fold as well as the intergluteal cleft  - Patient started on miconazole     Acute Kidney Injury, Resolved  - Likely multifactorial; Pre-Renal with Postobstructive Component; FeUrea 27.5%  - Discontinued patient's home lisinopril, furosemide  - US ultrasound showing no acute pathology  - Urine chloride 32, Urine Creatinine 94.8, Urine Osm 415, Urine Potassium 17.3, Urine Sodium 36, Urine Urea Nitrogen 782, Serum Osm 297  - Rothman catheter placed on 04/12  -
Post-Fall Assessment  Date of Fall:   4/10/2023  Time of Fall:   0800     Yes No N/A Comment   Was Patient on Falling Star Program? [x] [] []    Was the Fall Witnessed? [x] [] []    Were Clothes a Factor? [] [x] []    Was Patient Wearing Corrective Footwear? [x] [] []    Other Environmental Factors Involved? [] [x] []      Description of Fall  Who found the patient: Bryant Caballero, PT   Where was the patient at the time of the fall:  Attempting to sit in the chair  Brief description of fall: Therapy was assisting the patient to the chair. Patient slid from the chair to the ground assisted. Patient comments regarding fall:   I am OK   Medications potentially contributing to fall risk (such as Sedatives, Hypnotics, Antihypertensives, Narcotics, Psychotropics, Anticonvulsants):   no    Patient Assessment of Injury    (Please document Vital Signs in Doc Flowsheet)     Yes No   Patient hit his/her head [] [x]   Patient is taking an anticoagulant [x] []   CT of Head requested [] [x]     Neurological Assessment Protocol: If the patient has hit his/her head during this fall,   a Neurological Assessment must be completed every 2 hours for 12 hours;   every 3 hours for 24 hours;   then every 4 hours for 24 hours. Document in Doc Flowsheets. Neurological Assessment Protocol initiated  yes    If the patient did not hit his/her head during this fall, monitor vital signs every 8 hours. Notify the physician within 24 hours and document. Physician Notification  Please document under Provider Notification group within the Assessment (Complex Assessment) template of Doc Flowsheets.      Physician notified yes    Pharmacy notified yes Time Notified: [address] Supervisor/Clinical Manager Notified:   yes  Date:   4/10 Time:   7413  Family Member Notified:   yes   Date:   04/10 Time:   0165
Post-fall pharmacy consult    Pharmacy has been consulted to review medication profile for fall risk. Medications increasing risk of falling:   Amlodipine  Furosemide  Gabapentin  Hydralazine  Hydroxyzine  Lantus  Lisinopril  Metoprolol  Sertraline     Medications increasing risk of bleeding: Lovenox, aspirin    Recommendations: Patient was taking all medications prior to admission. May consider taking sertraline at night to decrease risk of falling due to dizziness.      Wallace Patel PharmD, [facility]  4/10/2023  4:05 PM
Pt refused bladder scan at this time. Will continue to assess. Pt continuing to urinate at this time. Last bladder scan performed at 1430 was 0 ml. External cath in place and pt having intermittent episodes of incontinence. Prior to bladder scan at 1430, pt had episode of incontinence of urine to point where is soaked the bed requiring a complete bed change.
Pt's primary RN notified that pt had approx. 501 mL of urine present on bladder scan. RN obtained orders to straight cath pt. Straight cath attempt unsuccessful due to pt's anatomy. Pt asked to void and 300mL out assessed in suction cannister as well as 2 chucks pads changed for being soaked in urine. Resident notified.     Annabel Valencia RN
When going for 8AM assessment and vitals patient was being moved to chair with OT. While assisting patient was unable to effectively transfer to chair and slid down. He was slowly lowered to floor. Patient expressed no concerns or pain, shanta lift was used and he was set in bed. Once in bed vitals were: Glucose-166, BP-157/71, P-63, R-18, T (O)-98.2, PO2%-99. Patients external catheter was replaced, output was 1000mL. Urine was clear/yellow with slight foaming. Patient stated a pain level of 0/10. Patient was A&Ox4, speech was clear with a tick of tongue clicking frequently. Patients upper and lower extremities have moderate strength and no reported numbness or tingling. Non-pitting edema present in all extremities. Patients respirations were shallow but unlabored. Patient was sitting upright and expressed no further needs at the time.
had sleep study performed)  Hx Head Lice         Expected discharge date:  Pending Pre-Cert    Disposition:   [] Home  [] TCU  [] Rehab  [] Psych  [x] SNF  [] [place]  [] Other-    ===================================================================      Chief Complaint: Physical Deconditioning    Hospital Course: This is a 50-year-old male who presented to [address] [place] [address] ED on 04/06/2023 at the direction of Adult Protective Services secondary to concerns of physical deconditioning. Patient has had previous history of head lice as well as bedbugs and difficulty caring for himself. Patient endorses also that he has had uncontrolled diabetes mellitus with home blood sugars in the 200s and 300s. He endorses difficulty with ambulating and is predominantly wheelchair-bound. Patient's labs in the emergency department showed evidence of a urinary tract infection for which he was started on ceftriaxone and admitted for further evaluation. Urine culture grew Enterobacter for which the patient was transitioned to cefepime. Anne catheter placed on 04/13/2023 by urologist.     Subjective (past 24 hours):   Patient seen and evaluated the bedside in no acute distress. He denies chest pain, fever, chills, nausea, vomiting, or diarrhea. He denies any dysphagia with meals. He is tolerating his Anne catheter without any dysuria or abdominal pain. We will continue to hold the patient's lisinopril after review of his BUN trend. Awaiting pre-CERT completion. Patient denying any additional acute symptoms or concerns. His mood is noticeably improved secondary to being able to see his brother yesterday. ROS: reviewed complete ROS unchanged unless otherwise stated in hospital course/subjective portion.        Medications:  Reviewed    Infusion Medications    sodium chloride 50 mL/hr at 04/09/23 1344    dextrose       Scheduled Medications    bisacodyl  10 mg Rectal Once    insulin lispro  20 Units SubCUTAneous TID WC
per 24 hour   Intake 240 ml   Output 1350 ml   Net -1110 ml       Exam:  /60   Pulse 61   Temp 98 °F (36.7 °C) (Oral)   Resp 16   Ht 5' 10\" (1.778 m)   Wt (!) 345 lb (156.5 kg)   SpO2 93%   BMI 49.50 kg/m²     General: No distress, appears stated age. Eyes:  PERRL. Conjunctivae/corneas clear. HENT: Head normal appearing. Nares normal. Oral mucosa moist.  Hearing intact. Neck: Supple, with full range of motion. Trachea midline. No gross JVD appreciated. Respiratory:  Diminished breath sounds bilaterally with no wheezes, crackles, or rhonchi. Cardiovascular: Normal rate, regular rhythm with normal S1/S2 without murmurs. No lower extremity edema. Abdomen: Soft, non-tender, non-distended with normal bowel sounds. Musculoskeletal: No joint swelling or tenderness. Normal tone. No abnormal movements. Skin: Warm and dry. No rashes or lesions. Neurologic:  No focal sensory/motor deficits in the upper or lower extremities. Cranial nerves:  grossly non-focal 2-12. Psychiatric: Alert and oriented, normal insight and thought content. Capillary Refill: Brisk,< 3 seconds. Peripheral Pulses: +2 palpable, equal bilaterally. Labs:   Recent Labs     04/09/23  0549 04/11/23  0511   WBC 6.5 6.0   HGB 11.5* 11.8*   HCT 37.5* 37.9*    174     Recent Labs     04/09/23  0549 04/10/23  0439 04/11/23  0511    139 138   K 5.1 4.2 4.1    105 102   CO2 25 18* 25   BUN 24* 26* 30*   CREATININE 1.2 0.9 1.3*   CALCIUM 9.1 9.3 9.3   PHOS 3.7 3.2  --      Recent Labs     04/11/23  0511   AST 15   ALT 13   BILIDIR <0.2   BILITOT 0.2*   ALKPHOS 82     No results for input(s): INR in the last 72 hours. No results for input(s): TROPONINT in the last 72 hours. No results for input(s): PROCAL in the last 72 hours.    Lab Results   Component Value Date/Time    NITRU POSITIVE 04/06/2023 03:45 PM    WBCUA > 200 04/06/2023 03:45 PM    BACTERIA MANY 04/06/2023 03:45 PM    RBCUA 3-5 04/06/2023 03:45
or lower extremities. Cranial nerves:  grossly non-focal 2-12. Minimal lip quivering noted. Psychiatric: Alert and oriented, normal insight and thought content. Capillary Refill: Brisk,< 3 seconds. Peripheral Pulses: +2 palpable, equal bilaterally. Labs:   Recent Labs     04/11/23 0511 04/12/23 0453 04/13/23 0435   WBC 6.0 6.2 5.7   HGB 11.8* 11.2* 11.5*   HCT 37.9* 37.5* 37.5*    168 167     Recent Labs     04/11/23 0511 04/12/23 0453 04/13/23 0435    138 137   K 4.1 3.6 3.6    106 105   CO2 25 23 25   BUN 30* 30* 37*   CREATININE 1.3* 1.0 1.1   CALCIUM 9.3 8.9 8.8     Recent Labs     04/11/23 0511 04/12/23 0453   AST 15 24   ALT 13 19   BILIDIR <0.2 <0.2   BILITOT 0.2* 0.2*   ALKPHOS 82 79     No results for input(s): INR in the last 72 hours. No results for input(s): TROPONINT in the last 72 hours. No results for input(s): PROCAL in the last 72 hours. Lab Results   Component Value Date/Time    NITRU POSITIVE 04/06/2023 03:45 PM    WBCUA > 200 04/06/2023 03:45 PM    BACTERIA MANY 04/06/2023 03:45 PM    RBCUA 3-5 04/06/2023 03:45 PM    BLOODU SMALL 04/06/2023 03:45 PM    GLUCOSEU >= 1000 04/06/2023 03:45 PM       Radiology (48 hours):  US RENAL COMPLETE    Result Date: 4/11/2023  Unremarkable study. No hydronephrosis. This document has been electronically signed by: Africa Kelly MD on 04/11/2023 10:13 PM       DVT prophylaxis:    [x] Lovenox  [] SCDs  [] SQ Heparin  [] Encourage ambulation   [] Already on Anticoagulation       Diet: ADULT ORAL NUTRITION SUPPLEMENT; Breakfast, Dinner; Wound Healing Oral Supplement  ADULT DIET; Regular; 4 carb choices (60 gm/meal);  Low Sodium (2 gm); 2000 ml  Code Status: Full Code  PT/OT: Consulted  Tele: None  IVF: None    Electronically signed by Shania Bella MD on 4/13/2023 at 1:22 PM    Case was discussed with Attending, Dr. Lorna Roberts

## 2023-04-14 NOTE — CARE COORDINATION
4/14/23, 10:12 AM EDT    DISCHARGE PLANNING EVALUATION    Call to [facility] with [facility], left a voicemail to follow up on precert. 11:24 AM  Call from [facility], reports they should here on precert today, she will keep SW updated. Pt will need COVID test prior to discharge. 2:14 PM EDT  Call from [facility], precert approved, they can take pt today. SW notified provider, they will order COVID test.     Call to [facility], possible transport late evening. SW updated pt, spoke with nursing as well. Call to [facility] with [facility], left a voicemail regarding plans for discharge, possible transport late evening. 4/14/23, 2:14 PM EDT    Patient goals/plan/ treatment preferences discussed by  and . Patient goals/plan/ treatment preferences reviewed with patient/ family. Patient/ family verbalize understanding of discharge plan and are in agreement with goal/plan/treatment preferences. Understanding was demonstrated using the teach back method. AVS provided by RN at time of discharge, which includes all necessary medical information pertaining to the patients current course of illness, treatment, post-discharge goals of care, and treatment preferences.      Services At/After Discharge: [facility] ([facility]) and In ambulance [facility], [facility] ([facility]) Ambulance

## 2023-04-21 ENCOUNTER — TELEPHONE (OUTPATIENT)
Dept: PULMONOLOGY | Age: 68
End: 2023-04-21

## 2023-05-08 ENCOUNTER — OFFICE VISIT (OUTPATIENT)
Dept: PULMONOLOGY | Age: 68
End: 2023-05-08
Payer: COMMERCIAL

## 2023-05-08 VITALS
OXYGEN SATURATION: 97 % | HEART RATE: 57 BPM | WEIGHT: 315 LBS | HEIGHT: 70 IN | TEMPERATURE: 97.8 F | BODY MASS INDEX: 45.1 KG/M2 | SYSTOLIC BLOOD PRESSURE: 122 MMHG | DIASTOLIC BLOOD PRESSURE: 72 MMHG

## 2023-05-08 DIAGNOSIS — I10 ESSENTIAL HYPERTENSION: ICD-10-CM

## 2023-05-08 DIAGNOSIS — G47.30 SLEEP APNEA, UNSPECIFIED TYPE: ICD-10-CM

## 2023-05-08 DIAGNOSIS — G47.10 HYPERSOMNIA: ICD-10-CM

## 2023-05-08 DIAGNOSIS — R06.81 APNEA: Primary | ICD-10-CM

## 2023-05-08 PROCEDURE — G8427 DOCREV CUR MEDS BY ELIG CLIN: HCPCS | Performed by: INTERNAL MEDICINE

## 2023-05-08 PROCEDURE — 3017F COLORECTAL CA SCREEN DOC REV: CPT | Performed by: INTERNAL MEDICINE

## 2023-05-08 PROCEDURE — 3074F SYST BP LT 130 MM HG: CPT | Performed by: INTERNAL MEDICINE

## 2023-05-08 PROCEDURE — G8417 CALC BMI ABV UP PARAM F/U: HCPCS | Performed by: INTERNAL MEDICINE

## 2023-05-08 PROCEDURE — 1111F DSCHRG MED/CURRENT MED MERGE: CPT | Performed by: INTERNAL MEDICINE

## 2023-05-08 PROCEDURE — 1036F TOBACCO NON-USER: CPT | Performed by: INTERNAL MEDICINE

## 2023-05-08 PROCEDURE — 1123F ACP DISCUSS/DSCN MKR DOCD: CPT | Performed by: INTERNAL MEDICINE

## 2023-05-08 PROCEDURE — 3078F DIAST BP <80 MM HG: CPT | Performed by: INTERNAL MEDICINE

## 2023-05-08 PROCEDURE — 99204 OFFICE O/P NEW MOD 45 MIN: CPT | Performed by: INTERNAL MEDICINE

## 2023-05-08 RX ORDER — CETIRIZINE HYDROCHLORIDE 10 MG/1
10 TABLET ORAL DAILY
COMMUNITY

## 2023-05-08 NOTE — PROGRESS NOTES
Chief Complaint: Erika Zaks is a new sleep consult, no prior studies    Mallampati airway Class:4  Neck Circumference:.  21Inches    [place] sleepiness score 5/8/23:   Sleep apnea quality of life questionnaire:

## 2023-05-08 NOTE — PATIENT INSTRUCTIONS
Recommendations/Plan:  -Will schedule patient for portable/home sleep study at [address] [address] sleep lab. Patient cannot have his in lab sleep study performed due to his limited mobility. Patient can move around only with the help of rolling wheelchair and he also had an indwelling Anne's catheter.   -I had a discussion with patient regarding avialable treatment options for his sleep disorder breathing including but not limited to CPAP titration in the sleep lab Vs.Dental appliance placement with referral to a local dentist Vs other available surgical options including Uvulopalatopharyngoplasty, maxillomandibular ostomy,Inspire device placement and tracheostomy as last option. At the end of discussion, he is not decided on his treatment if he found to have obstructive sleep apnea at this time.  -We will see Kristina Steward back in 1week after the sleep study to go over the sleep study results and further management options.  -He was educated to practice good sleep hygiene practices. He was provided with a good sleep hygiene hand out. Kumar Vega was advised to make earlier appointment with my clinic if he develops any worsening of sleep symptoms. He verbalizes understanding.  Milana AMADOR was advised to not to drive any motor vehicles or operate heavy equipment until his sleep symptoms are under good control. Kristina Steward verbalizes understanding.  -He was advised to loose weight by controlling diet and doing exercise once cleared by his cardiologist  - Kristina Steward was educated about my impression and plan. He verbalizes understanding.

## 2023-05-18 ENCOUNTER — OFFICE VISIT (OUTPATIENT)
Dept: UROLOGY | Age: 68
End: 2023-05-18
Payer: COMMERCIAL

## 2023-05-18 VITALS — WEIGHT: 315 LBS | RESPIRATION RATE: 16 BRPM | BODY MASS INDEX: 45.1 KG/M2 | HEIGHT: 70 IN

## 2023-05-18 DIAGNOSIS — N39.41 URGE INCONTINENCE: ICD-10-CM

## 2023-05-18 DIAGNOSIS — N32.81 OVERACTIVE BLADDER: ICD-10-CM

## 2023-05-18 DIAGNOSIS — R39.15 URINARY URGENCY: ICD-10-CM

## 2023-05-18 DIAGNOSIS — R33.9 RETENTION OF URINE: Primary | ICD-10-CM

## 2023-05-18 PROCEDURE — 1036F TOBACCO NON-USER: CPT | Performed by: UROLOGY

## 2023-05-18 PROCEDURE — G8417 CALC BMI ABV UP PARAM F/U: HCPCS | Performed by: UROLOGY

## 2023-05-18 PROCEDURE — 99204 OFFICE O/P NEW MOD 45 MIN: CPT | Performed by: UROLOGY

## 2023-05-18 PROCEDURE — G8427 DOCREV CUR MEDS BY ELIG CLIN: HCPCS | Performed by: UROLOGY

## 2023-05-18 PROCEDURE — 3017F COLORECTAL CA SCREEN DOC REV: CPT | Performed by: UROLOGY

## 2023-05-18 PROCEDURE — 1123F ACP DISCUSS/DSCN MKR DOCD: CPT | Performed by: UROLOGY

## 2023-05-18 NOTE — PROGRESS NOTES
sleeping)      FREESTYLE LITE strip TEST as directed four times a day 100 each 11    gabapentin (NEURONTIN) 300 MG capsule take 1 capsule by mouth three times a day 270 capsule 1    amLODIPine (NORVASC) 10 MG tablet Take 1 tablet by mouth daily. 90 tablet 1    aspirin EC 81 MG EC tablet Take 1 tablet by mouth daily. 90 tablet 3    Lancets MISC Apply 1 each topically 2 times daily. 200 each 1    Blood Glucose Monitoring Suppl (FREESTYLE FREEDOM LITE) W/DEVICE KIT by Does not apply route. Please dispense strips 100 kit 3    Alcohol Swabs PADS  200 each 11    Insulin Pen Needle (UNIFINE PENTIPS) 31G X 8 MM MISC by Does not apply route. 300 each 11       Aripiprazole, Aripiprazole, and Citalopram hydrobromide  Social History     Tobacco Use   Smoking Status Former    Packs/day: 1.50    Years: 15.00    Pack years: 22.50    Types: Cigarettes    Quit date: 1982    Years since quittin.4   Smokeless Tobacco Never       Social History     Substance and Sexual Activity   Alcohol Use Not Currently    Comment: one ddrink on new years and thats it       REVIEW OF SYSTEMS:  Constitutional: negative  Eyes: negative  Respiratory: negative  Cardiovascular: negative  Gastrointestinal: negative  Musculoskeletal: negative  Genitourinary: negative except for what is in HPI  Skin: negative   Neurological: negative  Hematological/Lymphatic: negative  Psychological: negative    Physical Exam:    This a 78 y.o. male   Vitals:    23 0833   Resp: 16     Constitutional: Patient in no acute distress; Neuro: alert and oriented to person place and time. Psych: Mood and affect normal.  Skin: Normal  Lungs: Respiratory effort normal  Cardiovascular:  Normal peripheral pulses  Abdomen: Soft, non-tender, non-distended   Bladder non-tender and not distended. Lymphatics: no palpable lymphadenopathy  Wheelchair bound  Musculoskeletal: Normal range of motion       Assessment and Plan      1. Retention of urine    2.  Overactive bladder

## 2023-05-22 ENCOUNTER — HOSPITAL ENCOUNTER (OUTPATIENT)
Dept: SLEEP CENTER | Age: 68
Discharge: HOME | End: 2023-05-24
Payer: COMMERCIAL

## 2023-05-22 DIAGNOSIS — R06.81 APNEA: ICD-10-CM

## 2023-05-22 DIAGNOSIS — G47.10 HYPERSOMNIA: ICD-10-CM

## 2023-05-22 DIAGNOSIS — G47.30 SLEEP APNEA, UNSPECIFIED TYPE: ICD-10-CM

## 2023-05-22 DIAGNOSIS — I10 ESSENTIAL HYPERTENSION: ICD-10-CM

## 2023-05-22 PROCEDURE — 95806 SLEEP STUDY UNATT&RESP EFFT: CPT

## 2023-05-22 NOTE — PROGRESS NOTES
2023       RE: Jenna Jhaveri  [address] [place] [id number]     Dear Colleague,    Thank you for referring your patient, Jenna Jhaveri, to the [facility] [place] at [facility]. Please see a copy of my visit note below.                        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 She was recently found to have weakness of wrist flexion and elbow extension, and is referred for further evaluation of these findings.     Techniques:  Motor conduction studies were done with surface recording electrodes. Sensory conduction studies were performed with surface electrodes, unless indicated otherwise by (n), designating the use of subdermal recording electrodes. Temperature was monitored and recorded throughout the study. Upper extremities were maintained at a temperature of 32 degrees Centigrade or higher.  EMG was done with a concentric needle electrode.     Results:  Left median, ulnar, and radial sensory conduction studies were normal. Left median and ulnar motor conduction studies were normal. Electromyography was normal.    Interpretation:  This is a normal study. In particular, there is no electrophysiologic evidence of cervical radiculopathy, polyneuropathy, or currently active myopathy in the left upper limb.      _____________________________  Kyle Syed MD  Board Certified in Clinical Neurophysiology and Neuromuscular Medicine        Nerve  A caregiver for Grey Garrido presents today for a HST instruction and demonstration on unit # 6679. Questions were asked and answers given. She was able to return demonstration and verbalized understanding. The sleep center control room phone number was provided in case questions arise during the study. Informed to call 911 in case of an emergency. She states she will return the unit tomorrow before 1000. Title:  Home Sleep Apnea Testing (HSAT)     Approved by:  Jose Prieto MD        Approval Date: December, 2021  Next Review: December, 2023       Responsible Party:  Gale Luis  Institution/Entities Applies to:    [address] Number:  None      Document Type:  Such as Guideline, Policy,   Policy & Procedure, or Procedure, Instructions   Manual:  Policy and Procedures     Section: IV  Policy Start Date: June, 2011       HOME SLEEP APNEA TESTING (HSAT)      PURPOSE: To ensure home sleep apnea testing (HSAT) conducted by the sleep facility adheres to the current AASM practice parameters, clinical practice guidelines, best practice and clinical guidelines in regard to the diagnosis of BALTA in adults. POLICY:      HSAT is a method of recording certain parameters which will target and measure, minimally, heart rate, oxygen saturation, respiratory airflow, respiratory effort and snoring for the purpose of evaluating a patient for BALTA. HSAT will be performed in conjunction with a comprehensive sleep evaluation by an appropriately licensed sleep facility medical staff member. All portable monitoring equipment will be FDA-approved and appropriately maintained to ensure patient safety and efficiency of the test.       PROCEDURE:      An order from a licensed sleep physician along with appropriate medical history documenting the indication for HSAT that complies with the AASM practice parameters.     All tests will be performed, and Conduction Studies  Motor Sites      Latency Amplitude Neg. Amp Diff Segment Distance Velocity Neg. Dur Neg Area Diff Temperature Comment   Site (ms) Norm (mV) Norm %  cm m/s Norm ms %  C    Left Median (APB) Motor   Wrist 4.1  < 4.4 6.6  > 5.0  Wrist-APB 8   5.2  32.2    Elbow 8.0 - 5.6  > 5.0 -15.2 Elbow-Wrist 21.5 55  > 48 5.3 -14.0 32.2    Left Ulnar (ADM) Motor   Wrist 2.4  < 3.5 7.8  > 5.0  Wrist-ADM 8   5.0  31.9    Bel Elbow 5.5 - 7.0 - -10.3 Bel Elbow-Wrist 18 58  > 48 5.4 -4.7 31.9    Abv Elbow 7.5 - 6.7 - -4.3 Abv Elbow-Bel Elbow 10 50  > 48 5.5 0 31.9      Sensory Sites      Onset Lat Peak Lat Amp (O-P) Amp (P-P) Segment Distance Velocity Temperature Comment   Site ms ms  V Norm  V  cm m/s Norm  C    Left Median Sensory   Wrist-Dig II 2.9 4.0 19  > 10 21 Wrist-Dig II 14 48  > 48 31.9    Left Radial Sensory   Forearm-Wrist 1.53 2.2 17  > 15 29 Forearm-Wrist 10 65 - 32    Left Ulnar Sensory   Wrist-Dig V 2.2 2.8 21  > 8 42 Wrist-Dig V 12.5 57  > 48 32        Electromyography     Side Muscle Ins Act Fibs/PSW Fasc HF Amp Dur Poly Recrt Int Pat   Left FCR Nml None Nml 0 Nml Nml 0 Nml Nml   Left Triceps Nml None Nml 0 Nml Nml 0 Nml Nml   Left Triceps  LatHd Nml None Nml 0 Nml Nml 0 Nml Nml   Left Triceps LongHd Nml None Nml 0 Nml Nml 0 Nml Nml   Left Deltoid Nml None Nml 0 Nml Nml 0 Nml Nml   Left Biceps Nml None Nml 0 Nml Nml 0 Nml Nml   Left EDC Nml None Nml 0 Nml Nml 0 Nml Nml   Left FDI Nml None Nml 0 Nml Nml 0 Nml Nml   Left C7 Parasp Nml None Nml 0              NCS Waveforms:    Motor         Sensory                       Again, thank you for allowing me to participate in the care of your patient.      Sincerely,    Kyle Syed MD

## 2023-05-23 LAB — STATUS: NORMAL

## 2023-05-24 NOTE — PROGRESS NOTES
[address] [id number]                               SLEEP STUDY REPORT    PATIENT NAME: Erica Guthrie                  :        1955  MED REC NO:   [id number]                           ROOM:  ACCOUNT NO:   [de-identified]                           ADMIT DATE: 2023  PROVIDER:     STEPHANY Gonzalez Armor:  2023    HOME SLEEP STUDY REPORT    REFERRING PROVIDER:  Shanita Palomares MD    SLEEP SPECIALIST:  Marry Payne MD    HISTORY OF PRESENT ILLNESS:  The patient is a 23-year-old nursing home  resident. He is wheelchair-bound and he has an indwelling Anne  catheter. Recent hospitalization resulted in weakness, episodes of  sleep apnea. He has a history of heart failure with preserved ejection  fraction, excessive daytime somnolence, inadequate sleep hygiene, loud  snoring, history of nocturnal hypoxia. Home sleep study was completed  as the patient is not able to attend to the sleep lab for in-lab  polysomnogram.  Weight 375 pounds, height 70 inches, BMI 53.8. METHODS:  The patient underwent Type III Portable Monitoring Sleep Study  including the simultaneous recording of oral-nasal airflow, rib and  abdominal respiratory effort, pulse rate, oxygen saturation, and body  position. Scoring criteria is consistent with the current published  AASM standards for scoring of apneas and hypopneas 1B. This is a type 3 polysomnogram.    DETAILS OF THE STUDY:  The patient presented to the sleep center, picked  up his HST unit number 8371. He was given instructions how to set up  the system. The study was completed and the unit was returned the next  morning. It was successfully downloaded and scored. The patient's  questionnaires were not written. Total recording time 635 minutes. Lights off time 10:24 p.m., lights on time 08:59 a.m.     RESPIRATORY SUMMARY:  14 obstructive apneas, 1 central

## 2023-06-07 ENCOUNTER — OFFICE VISIT (OUTPATIENT)
Dept: PULMONOLOGY | Age: 68
End: 2023-06-07
Payer: COMMERCIAL

## 2023-06-07 VITALS
DIASTOLIC BLOOD PRESSURE: 72 MMHG | TEMPERATURE: 98 F | OXYGEN SATURATION: 95 % | WEIGHT: 315 LBS | HEART RATE: 55 BPM | BODY MASS INDEX: 45.1 KG/M2 | HEIGHT: 70 IN | SYSTOLIC BLOOD PRESSURE: 132 MMHG

## 2023-06-07 DIAGNOSIS — G47.33 OSA (OBSTRUCTIVE SLEEP APNEA): Primary | ICD-10-CM

## 2023-06-07 DIAGNOSIS — I10 ESSENTIAL HYPERTENSION: ICD-10-CM

## 2023-06-07 PROCEDURE — G8417 CALC BMI ABV UP PARAM F/U: HCPCS | Performed by: INTERNAL MEDICINE

## 2023-06-07 PROCEDURE — 3075F SYST BP GE 130 - 139MM HG: CPT | Performed by: INTERNAL MEDICINE

## 2023-06-07 PROCEDURE — G8428 CUR MEDS NOT DOCUMENT: HCPCS | Performed by: INTERNAL MEDICINE

## 2023-06-07 PROCEDURE — 3017F COLORECTAL CA SCREEN DOC REV: CPT | Performed by: INTERNAL MEDICINE

## 2023-06-07 PROCEDURE — 1123F ACP DISCUSS/DSCN MKR DOCD: CPT | Performed by: INTERNAL MEDICINE

## 2023-06-07 PROCEDURE — 3078F DIAST BP <80 MM HG: CPT | Performed by: INTERNAL MEDICINE

## 2023-06-07 PROCEDURE — 1036F TOBACCO NON-USER: CPT | Performed by: INTERNAL MEDICINE

## 2023-06-07 PROCEDURE — 99214 OFFICE O/P EST MOD 30 MIN: CPT | Performed by: INTERNAL MEDICINE

## 2023-06-07 NOTE — PROGRESS NOTES
Chief Complaint: Luis Manuel Diaz is here for Home Sleep Study results    Mallampati airway Class:4  Neck Circumference:.21 Inches    [place] sleepiness score 6/7/23: 6  Sleep apnea quality of life questionnaire:.93

## 2023-06-07 NOTE — PATIENT INSTRUCTIONS
Recommendations/Plan:  -I had a discussion with patient regarding avialable treatment options for his sleep disorder breathing including but not limited to CPAP titration in the sleep lab Vs.Dental appliance placement with referral to a local dentist Vs other available surgical options including Uvulopalatopharyngoplasty, maxillomandibular ostomy, Inspire device placement and tracheostomy as last option. At the end of discussion, he decided to go for treatment with a CPAP device therapy.  -Patient cannot have his in lab PAP titration study performed due to his limited mobility. Patient can move around only with the help of rolling wheelchair and he also had an indwelling Anne's catheter.   -Will start patient on Auto CPAP machine therapy with a Minimum CPAP of 6cm H20 and Maximum CPAP of 20cm H20. He need to be followed closely with down load by DME company in 4 weeks. He was instructed to make earlier appointment with my sleep clinic if he had any difficulty in using his auto CPAP machine therapy to consider for in lab CPAP titration. He verbalizes understanding.  -He need to come for follow up with my clinic in 2months to 10 weeks with a CPAP down load for clinical reevaluation and management.  -he advised to keep good compliance with recommended positive airway pressure therapy to get optimal results and clinical improvement.  -He was advised to call [employer] regarding supplies if needed. -He was advised to call my office for earlier appointment if needed for worsening of sleep symptoms.  -He was advised to continue to practice good sleep hygiene practices.  -He was instructed to not to drive any motor vehicles or operate heavy equipment if he feels sleepy. -He was advised to loose weight by controlling diet and doing exercise once cleared by his family physician.   -Giselle Blair was educated about my impression and plan. He verbalizes understanding.

## 2023-06-27 ENCOUNTER — OFFICE VISIT (OUTPATIENT)
Dept: UROLOGY | Age: 68
End: 2023-06-27

## 2023-06-27 VITALS — HEIGHT: 70 IN | WEIGHT: 315 LBS | RESPIRATION RATE: 16 BRPM | BODY MASS INDEX: 45.1 KG/M2

## 2023-06-27 DIAGNOSIS — R39.15 URINARY URGENCY: ICD-10-CM

## 2023-06-27 DIAGNOSIS — N39.41 URGE INCONTINENCE: ICD-10-CM

## 2023-06-27 DIAGNOSIS — Z12.5 SCREENING FOR PROSTATE CANCER: ICD-10-CM

## 2023-06-27 DIAGNOSIS — R33.9 RETENTION OF URINE: Primary | ICD-10-CM

## 2023-06-27 LAB — POST VOID RESIDUAL (PVR): 0 ML

## 2023-06-27 RX ORDER — TAMSULOSIN HYDROCHLORIDE 0.4 MG/1
0.4 CAPSULE ORAL DAILY
Qty: 90 CAPSULE | Refills: 3 | Status: SHIPPED | OUTPATIENT
Start: 2023-06-27

## 2023-08-02 NOTE — ED NOTES
SR up x 2. Resp assisted with vent.       Eli Lima RN  03/07/18 3498 Electrodesiccation Text: The wound bed was treated with electrodesiccation after the biopsy was performed. Anesthesia Volume In Cc: 0.5 Render Post-Care Instructions In Note?: no Detail Level: Detailed Size Of Lesion In Cm: 1.1 Biopsy Method: 15 blade Notification Instructions: Patient will be notified of biopsy results. However, patient instructed to call the office if not contacted within 2 weeks. Information: Selecting Yes will display possible errors in your note based on the variables you have selected. This validation is only offered as a suggestion for you. PLEASE NOTE THAT THE VALIDATION TEXT WILL BE REMOVED WHEN YOU FINALIZE YOUR NOTE. IF YOU WANT TO FAX A PRELIMINARY NOTE YOU WILL NEED TO TOGGLE THIS TO 'NO' IF YOU DO NOT WANT IT IN YOUR FAXED NOTE. Cryotherapy Text: The wound bed was treated with cryotherapy after the biopsy was performed. Type Of Destruction Used: Curettage Wound Care: Bacitracin Curettage Text: The wound bed was treated with curettage after the biopsy was performed. Was A Bandage Applied: Yes Billing Type: Third-Party Bill Biopsy Type: H and E X Size Of Lesion In Cm: 0.9 Additional Anesthesia Volume In Cc (Will Not Render If 0): 0 Hemostasis: Drysol Consent: Written consent was obtained and risks were reviewed including but not limited to scarring, infection, bleeding, scabbing, incomplete removal, nerve damage and allergy to anesthesia. Path Notes (To The Dermatopathologist): This lesion HAS NOT been previously treated. \\n\\nPatient's Medical History\\nImmunosuppression: NO  \\nOrgan Transplant: NO  \\nRadiation: NO Post-Care Instructions: I reviewed with the patient in detail post-care instructions. Patient is to keep the biopsy site dry overnight, and then apply bacitracin twice daily until healed. Patient may apply hydrogen peroxide soaks to remove any crusting. Silver Nitrate Text: The wound bed was treated with silver nitrate after the biopsy was performed. Depth Of Biopsy: dermis Electrodesiccation And Curettage Text: The wound bed was treated with electrodesiccation and curettage after the biopsy was performed. Anesthesia Type: 1% lidocaine with epinephrine Dressing: pressure dressing with telfa

## 2023-08-07 NOTE — PROGRESS NOTES
[place] for Pulmonary, Sleep and [facility] Follow up note    Rancho Dwyer                                             Chief complaint and [place]: Rancho Dwyer is a 80 y. o.oldmale came for follow up regarding his sleep apnea. He is currently using his positive airway pressure device with an Auto CPAP machine therapy with a Minimum CPAP of 6cm H20 and Maximum CPAP of 20cm H20. He denies any problems with machine. He is currently using a fullface mask. He had a difficulty in tolerating the full facemask leading to poor compliance with the CPAP device. He is sleeping well at night with out difficulty. He denies any daytime sleepiness. He is currently staying at [facility]. He underwent home/portable sleep study on 05/22/2023. Patient cannot have his in lab sleep study performed due to his limited mobility. Patient can move around only with the help of rolling wheelchair and he also had an indwelling Anne's catheter. Review of Systems:   General/Constitutional: he gained 17lbs of weight from the last visit with normal appetite. No fever or chills. HENT: Negative. Eyes: Negative. Upper respiratory tract: No nasal stuffiness or post nasal drip. Lower respiratory tract/ lungs: No cough or sputum production. No hemoptysis. Cardiovascular: No palpitations or chest pain. Gastrointestinal: No nausea or vomiting. Neurological: No focal neurologiacal weakness. Extremities: No edema. Musculoskeletal: No complaints. Genitourinary: No complaints. Hematological: Negative. Psychiatric/Behavioral: Negative. Skin: No itching.         Past Medical History:   Diagnosis Date    Depression     Gout     Hyperlipidemia     Hypertension     Neuropathy     Pneumonia     Type II or unspecified type diabetes mellitus without mention of complication, not stated as uncontrolled        Past Surgical History:   Procedure Laterality Date    BRONCHOSCOPY

## 2023-09-06 ENCOUNTER — OFFICE VISIT (OUTPATIENT)
Dept: PULMONOLOGY | Age: 68
End: 2023-09-06
Payer: COMMERCIAL

## 2023-09-06 VITALS
BODY MASS INDEX: 45.1 KG/M2 | SYSTOLIC BLOOD PRESSURE: 122 MMHG | WEIGHT: 315 LBS | HEIGHT: 70 IN | DIASTOLIC BLOOD PRESSURE: 66 MMHG | OXYGEN SATURATION: 98 % | HEART RATE: 52 BPM | TEMPERATURE: 98.7 F

## 2023-09-06 DIAGNOSIS — G47.33 OSA ON CPAP: ICD-10-CM

## 2023-09-06 DIAGNOSIS — Z99.89 OSA ON CPAP: ICD-10-CM

## 2023-09-06 DIAGNOSIS — G47.33 OSA (OBSTRUCTIVE SLEEP APNEA): Primary | ICD-10-CM

## 2023-09-06 DIAGNOSIS — I10 ESSENTIAL HYPERTENSION: ICD-10-CM

## 2023-09-06 PROCEDURE — G8417 CALC BMI ABV UP PARAM F/U: HCPCS | Performed by: INTERNAL MEDICINE

## 2023-09-06 PROCEDURE — 3078F DIAST BP <80 MM HG: CPT | Performed by: INTERNAL MEDICINE

## 2023-09-06 PROCEDURE — 1123F ACP DISCUSS/DSCN MKR DOCD: CPT | Performed by: INTERNAL MEDICINE

## 2023-09-06 PROCEDURE — 1036F TOBACCO NON-USER: CPT | Performed by: INTERNAL MEDICINE

## 2023-09-06 PROCEDURE — G8427 DOCREV CUR MEDS BY ELIG CLIN: HCPCS | Performed by: INTERNAL MEDICINE

## 2023-09-06 PROCEDURE — 99214 OFFICE O/P EST MOD 30 MIN: CPT | Performed by: INTERNAL MEDICINE

## 2023-09-06 PROCEDURE — 3074F SYST BP LT 130 MM HG: CPT | Performed by: INTERNAL MEDICINE

## 2023-09-06 PROCEDURE — 3017F COLORECTAL CA SCREEN DOC REV: CPT | Performed by: INTERNAL MEDICINE

## 2023-09-06 NOTE — PATIENT INSTRUCTIONS
Recommendations/Plan:  -Change his current auto CPAP pressure settings to fixed CPAP pressure of 10 cm of water with a EPR of 2 cm of water.  -He was advised to submit his down load report from hisCPAP for next 1month starting from today to document his > 4hour compliance. He was informed about the possibility of loosing his CPAP if he don't use it with good compliance for >4hours i.e >70%. He verbalizes understanding.  -We will request recent nursing home staff to check on his a CPAP therapy to improve his compliance for more than 4 hours of CPAP therapy. --He will be referred to [employer]( [address] [place] [address],[address]) [employer] for mask refit with a different style mask for better compliance and comfort.  -He is aware of the consequences of poor compliance to his recommended positive air way pressure therapy including increased risk for cardiovascular and cerebrovascular events and morbidity including death.  -He was informed about the possibility of loosing his CPAP if he don't use it with good compliance for >4hours i.e >70%. He verbalizes understanding.  -He was instructed to extend his sleep schedule to 7 to 9 hours in a given 24hour period continuously. -He was advised to continue current positive airway pressure therapy with above described pressure.  -He was advised to keep good compliance with current recommended pressure to get optimal results and clinical improvement.  -Follow with my clinic in 3months for clinical reevaluation with review of download. -He was advised to call [email or web address] regarding supplies if needed. -He was advised to loose weight by controlling diet and doing exercise once cleared by his Cardiologist   -He was advised to call my office for earlier appointment if needed for worsening of sleep symptoms.  -Donal De Leon was educated about my impression and plan. Patient verbalizes understanding.

## 2023-09-06 NOTE — PROGRESS NOTES
Chief Complaint: 3 month f/u with download     Mallampati airway Class:4  Neck Circumference:. 21Inches    [place] sleepiness score 9/6/23:   Sleep apnea quality of life questionnaire:      Diagnostic Data:   PAP Download:   Recorded compliance dates:6/8/23-9/5/23  More than 4hour usage compliance was 6:%. Average residual Apnea- Hypoapnea index on current pressue was:. 4.0      PAP Type  auto  Level   6/20    Average usuage hours per day was:.[id number]     Interface: full    Provider:  []-RODNEY  []Nakia  []Maximilian  []Dirk         [][facility] []Other:

## 2023-10-16 ENCOUNTER — HOSPITAL ENCOUNTER (OUTPATIENT)
Dept: CT IMAGING | Age: 68
Discharge: HOME | End: 2023-10-16
Payer: MEDICAID

## 2023-10-16 DIAGNOSIS — K56.609 INTESTINAL OBSTRUCTION, UNSPECIFIED CAUSE, UNSPECIFIED WHETHER PARTIAL OR COMPLETE (HCC): ICD-10-CM

## 2023-10-16 LAB — POC CREATININE WHOLE BLOOD: 1.3 MG/DL (ref 0.5–1.2)

## 2023-10-16 PROCEDURE — 74177 CT ABD & PELVIS W/CONTRAST: CPT

## 2023-10-16 PROCEDURE — 6360000004 HC RX CONTRAST MEDICATION

## 2023-10-16 PROCEDURE — 82565 ASSAY OF CREATININE: CPT

## 2023-10-16 RX ADMIN — IOPAMIDOL 80 ML: 755 INJECTION, SOLUTION INTRAVENOUS at 09:45

## 2023-11-12 NOTE — PROGRESS NOTES
[place] for Pulmonary, Sleep and [address] Follow up note    Susana Roy                                             Chief complaint and [place]: Susana Roy is a 80 y. o.oldmale came for follow up regarding his sleep apnea. He is currently using his positive airway pressure device with an Auto CPAP machine therapy with a Minimum CPAP of 6cm H20 and Maximum CPAP of 20cm H20. At the last visit his machine settings were changed to fixed CPAP pressure 10 cm of water with an EPR of 2. His DME company [employer] did not change his CPAP pressure settings to a fixed CPAP pressure of 10 cm of water with an EPR of 2. He remained on treatment with auto CPAP with a minimum CPAP pressure of 6 and METS of water max CPAP pressure 20 cm of water. Patient is still having a poor complaints to recommended CPAP therapy. He is not getting any help from the nurses at nursing home  [facility] to clean his CPAP tubing or mask to use with his CPAP device. He is worried that he is going get infection from the dirty tubing and mask. He is not sleeping well at night. He denies any daytime sleepiness. He is currently staying at [facility] facility. He is to be discharged home from his nursing home in near future whenever he started walking on his own. He underwent home/portable sleep study on 05/22/2023. Patient cannot have his in lab sleep study performed due to his limited mobility. Patient can move around only with the help of rolling wheelchair and he also had an indwelling Anne's catheter. Review of Systems:   General/Constitutional: he lost 74lbs of weight from the last visit. No fever or chills. HENT: Negative. Eyes: Negative. Upper respiratory tract: No nasal stuffiness or post nasal drip. Lower respiratory tract/ lungs: No cough or sputum production.   Cardiovascular: No palpitations or chest

## 2023-12-12 ENCOUNTER — OFFICE VISIT (OUTPATIENT)
Dept: PULMONOLOGY | Age: 68
End: 2023-12-12
Payer: COMMERCIAL

## 2023-12-12 VITALS
BODY MASS INDEX: 44.52 KG/M2 | DIASTOLIC BLOOD PRESSURE: 80 MMHG | HEART RATE: 53 BPM | HEIGHT: 70 IN | WEIGHT: 311 LBS | TEMPERATURE: 97.3 F | SYSTOLIC BLOOD PRESSURE: 160 MMHG | OXYGEN SATURATION: 97 %

## 2023-12-12 DIAGNOSIS — G47.33 OSA ON CPAP: Primary | ICD-10-CM

## 2023-12-12 PROCEDURE — G8428 CUR MEDS NOT DOCUMENT: HCPCS | Performed by: INTERNAL MEDICINE

## 2023-12-12 PROCEDURE — 3017F COLORECTAL CA SCREEN DOC REV: CPT | Performed by: INTERNAL MEDICINE

## 2023-12-12 PROCEDURE — 3079F DIAST BP 80-89 MM HG: CPT | Performed by: INTERNAL MEDICINE

## 2023-12-12 PROCEDURE — G8484 FLU IMMUNIZE NO ADMIN: HCPCS | Performed by: INTERNAL MEDICINE

## 2023-12-12 PROCEDURE — 99214 OFFICE O/P EST MOD 30 MIN: CPT | Performed by: INTERNAL MEDICINE

## 2023-12-12 PROCEDURE — 1123F ACP DISCUSS/DSCN MKR DOCD: CPT | Performed by: INTERNAL MEDICINE

## 2023-12-12 PROCEDURE — G8417 CALC BMI ABV UP PARAM F/U: HCPCS | Performed by: INTERNAL MEDICINE

## 2023-12-12 PROCEDURE — 1036F TOBACCO NON-USER: CPT | Performed by: INTERNAL MEDICINE

## 2023-12-12 PROCEDURE — 3077F SYST BP >= 140 MM HG: CPT | Performed by: INTERNAL MEDICINE

## 2023-12-12 NOTE — PROGRESS NOTES
Chief Complaint: 3 month f/u with download     Mallampati airway Class:4  Neck Circumference:.18.5 Inches    [place] sleepiness score 12/12/23: 0  Sleep apnea quality of care questionnaire:84      Diagnostic Data:   PAP Download:   Recorded compliance dates:9/13/23-12/11/23  More than 4hour usage compliance was:2%.   Average residual Apnea- Hypoapnea index on current pressue was:4.9.      PAP Type auto   Level  6/20     Average usuage hours per day was:.3 hours 24 mins     Interface: full    Provider:  []-HME  [][employer]  []Maximilian  []Renéare         [][facility] [x]Other: [employer]

## 2023-12-12 NOTE — PATIENT INSTRUCTIONS
Recommendations/Plan:  -Will  change his current auto CPAP pressure settings to fixed a CPAP pressure of 8 cm of water with a EPR of 2 cm of water to improve his compliance and minimize leaks  -He was advised to contact his [employer] by calling them at [phone] in future regarding CPAP supplies.  -Will request nursing staff at [facility] to take proper care of his CPAP machine, tubing and mask as recommended by Yunno so that patient can use his CPAP device with a good compliance. -He was advised to submit his down load report from hisAP for next 1month starting from today to document his > 4hour compliance. He was informed about the possibility of loosing his CPAP if he don't use it with good compliance for >4hours i.e >70%. He verbalizes understanding.  -We will request recent nursing home staff to check on his a CPAP therapy to improve his compliance for more than 4 hours of CPAP therapy. --He will be referred to [employer]( [address] [place] [address],[address]) company for mask refit with a different style mask for better compliance and comfort.  -He is aware of the consequences of poor compliance to his recommended positive air way pressure therapy including increased risk for cardiovascular and cerebrovascular events and morbidity including death.  -He was informed about the possibility of loosing his CPAP if he don't use it with good compliance for >4hours i.e >70%. He verbalizes understanding.  -He was instructed to extend his sleep schedule to 7 to 9 hours in a given 24hour period continuously.   -He was educated and advised to apply his current mask properly and tight enough to minimize leaks.  -He was advised to continue current positive airway pressure therapy with above described pressure.  -He was advised to keep good compliance with current recommended pressure to get optimal results and clinical improvement.  -Follow with my clinic in 4months for

## 2023-12-27 ENCOUNTER — OFFICE VISIT (OUTPATIENT)
Dept: UROLOGY | Age: 68
End: 2023-12-27
Payer: COMMERCIAL

## 2023-12-27 ENCOUNTER — TELEPHONE (OUTPATIENT)
Dept: PULMONOLOGY | Age: 68
End: 2023-12-27

## 2023-12-27 VITALS — WEIGHT: 311 LBS | RESPIRATION RATE: 18 BRPM | HEIGHT: 70 IN | BODY MASS INDEX: 44.52 KG/M2

## 2023-12-27 DIAGNOSIS — R33.9 RETENTION OF URINE: Primary | ICD-10-CM

## 2023-12-27 DIAGNOSIS — N13.8 BPH WITH OBSTRUCTION/LOWER URINARY TRACT SYMPTOMS: ICD-10-CM

## 2023-12-27 DIAGNOSIS — R39.15 URINARY URGENCY: ICD-10-CM

## 2023-12-27 DIAGNOSIS — Z12.5 SCREENING FOR PROSTATE CANCER: ICD-10-CM

## 2023-12-27 DIAGNOSIS — N40.1 BPH WITH OBSTRUCTION/LOWER URINARY TRACT SYMPTOMS: ICD-10-CM

## 2023-12-27 LAB — POST VOID RESIDUAL (PVR): 0 ML

## 2023-12-27 PROCEDURE — G8427 DOCREV CUR MEDS BY ELIG CLIN: HCPCS

## 2023-12-27 PROCEDURE — 99213 OFFICE O/P EST LOW 20 MIN: CPT

## 2023-12-27 PROCEDURE — 3017F COLORECTAL CA SCREEN DOC REV: CPT

## 2023-12-27 PROCEDURE — G8484 FLU IMMUNIZE NO ADMIN: HCPCS

## 2023-12-27 PROCEDURE — 1123F ACP DISCUSS/DSCN MKR DOCD: CPT

## 2023-12-27 PROCEDURE — 1036F TOBACCO NON-USER: CPT

## 2023-12-27 PROCEDURE — 51798 US URINE CAPACITY MEASURE: CPT

## 2023-12-27 PROCEDURE — G8417 CALC BMI ABV UP PARAM F/U: HCPCS

## 2023-12-27 NOTE — TELEPHONE ENCOUNTER
Vitaliy Farias from [facility] called office because patient informed facility that at office visit with Dr Sadaf Vann on 12/12/23 orders were placed for patient's PAP machine but facility never received any orders from visit. Faxed CPAP order to Vitaliy Farias at [facility] (see media).    Fax: [phone]

## 2023-12-27 NOTE — PROGRESS NOTES
[address] [address]  Dept: [phone]  Loc: [phone]  Visit Date: 12/27/2023    CHRISTINE Gray is a 80 y.o. male that presents to the urology clinic for urinary retention, OAB, urgency of urination, and urge incontinence. Nursing home patient. Patient had acute episode of urinary retention for which he underwent voiding trial after catheter removal in the office on 5/18/23. Flomax 0.4 mg once daily was started. Here today for repeat PVR check. Single instance of urinary retention. No trouble voiding since his last visit. Tolerating Flomax well. Voiding 3-4 times per day. Denies waking up wet in the morning. Parkinson's patient. Recently started Cinemet. Most recent PSA: 1.50 (8/20/12)    Last BUN and creatinine:  Lab Results   Component Value Date    BUN 30 (H) 04/14/2023     Lab Results   Component Value Date    CREATININE 1.2 04/14/2023         PAST MEDICAL, FAMILY AND SOCIAL HISTORY UPDATE:  Past Medical History:   Diagnosis Date    Depression     Gout     Hyperlipidemia     Hypertension     Neuropathy     Pneumonia     Type II or unspecified type diabetes mellitus without mention of complication, not stated as uncontrolled      Past Surgical History:   Procedure Laterality Date    BRONCHOSCOPY N/A 3/8/2018    BRONCHOSCOPY ALVEOLAR LAVAGE performed by Ramila Gerard MD at [facility]    TONSILLECTOMY       Family History   Problem Relation Age of Onset    Heart Attack Mother     Diabetes Mother     Lung Cancer Father     Cancer Maternal Uncle     Cancer Paternal Grandmother     Diabetes Paternal Grandmother     Parkinsonism Paternal Uncle      Outpatient Medications Marked as Taking for the 12/27/23 encounter (Office Visit) with Yanni Bravo PA-C   Medication Sig Dispense Refill    Misc.  Devices (CPAP MACHINE) MISC by Does not apply route Please change his current auto CPAP <-- Click to add NO pertinent Past Medical History

## 2024-06-13 ENCOUNTER — OFFICE VISIT (OUTPATIENT)
Dept: CARDIOLOGY CLINIC | Age: 69
End: 2024-06-13
Payer: MEDICAID

## 2024-06-13 VITALS
HEIGHT: 70 IN | DIASTOLIC BLOOD PRESSURE: 78 MMHG | HEART RATE: 60 BPM | BODY MASS INDEX: 44.62 KG/M2 | SYSTOLIC BLOOD PRESSURE: 140 MMHG

## 2024-06-13 DIAGNOSIS — I20.9 ANGINA PECTORIS (HCC): ICD-10-CM

## 2024-06-13 DIAGNOSIS — I50.30 DIASTOLIC CONGESTIVE HEART FAILURE, UNSPECIFIED HF CHRONICITY (HCC): ICD-10-CM

## 2024-06-13 DIAGNOSIS — R06.02 SHORTNESS OF BREATH: ICD-10-CM

## 2024-06-13 DIAGNOSIS — R07.9 CHEST PAIN, UNSPECIFIED TYPE: Primary | ICD-10-CM

## 2024-06-13 PROCEDURE — 3077F SYST BP >= 140 MM HG: CPT | Performed by: INTERNAL MEDICINE

## 2024-06-13 PROCEDURE — 3078F DIAST BP <80 MM HG: CPT | Performed by: INTERNAL MEDICINE

## 2024-06-13 PROCEDURE — 99204 OFFICE O/P NEW MOD 45 MIN: CPT | Performed by: INTERNAL MEDICINE

## 2024-06-13 PROCEDURE — 1123F ACP DISCUSS/DSCN MKR DOCD: CPT | Performed by: INTERNAL MEDICINE

## 2024-06-13 RX ORDER — MENTHOL 40 MG/ML
GEL TOPICAL
COMMUNITY

## 2024-06-13 RX ORDER — LIRAGLUTIDE 6 MG/ML
INJECTION SUBCUTANEOUS
COMMUNITY
Start: 2024-05-27

## 2024-06-13 RX ORDER — ACETAMINOPHEN 500 MG
1000 TABLET ORAL EVERY 6 HOURS PRN
COMMUNITY

## 2024-06-13 RX ORDER — FUROSEMIDE 20 MG/1
20 TABLET ORAL
Qty: 60 TABLET | Refills: 3 | Status: SHIPPED | OUTPATIENT
Start: 2024-06-13

## 2024-06-13 NOTE — PATIENT INSTRUCTIONS
Your nurses today were Shane   Your provider today was Dr. Oliveira   Phone number: [phone]      You may receive a survey regarding the care you received during your visit.  Your input is valuable to us.  We encourage you to complete and return your survey.  We hope you will choose us in the future for your healthcare needs.

## 2024-06-13 NOTE — PROGRESS NOTES
[facility] [place] [facility]  [facility] CARDIOLOGY  [address]  [address]  [place] [address]  Dept: [phone]  Dept Fax: [phone]  Loc: [phone]    Visit Date: 6/13/2024    Mr. Aquino is a 68 y.o. male  who presented for:  New referral  CHF, abnormal EKG  HPI:   HPI   Lico Aquino is a pleasant 68 year old male patient who  has a past medical history of Depression, Gout, Hyperlipidemia, Hypertension, Neuropathy, Pneumonia, and Type II or unspecified type diabetes mellitus without mention of complication, not stated as uncontrolled. Patient was referred to cardiology for HFpEF, abnormal EKG. Echocardiogram on 4/2023 revealed an EF that seemed to be normal, but study was limited due to poor image quality and Defnity study was recommended. Patient has h/o BALTA, followed by pulmonary medicine. He reports occasional chest pain described \"twinge\" in the chest. He uses his wheelchair, limited mobility due to obesity and joint pains per patient.  Patient denies shortness of breath, dyspnea on exertion, palpitations, dizziness, syncope. Had mild leg swelling.       Current Outpatient Medications:     Misc. Devices (CPAP MACHINE) MISC, by Does not apply route Please change his current auto CPAP pressure settings to a fixed CPAP pressure of 8 cm H20 with an EPR of 2.  Please do mask refit and issue a new mask of his choice to minimize leaks around his current mask., Disp: 1 each, Rfl: 0    Misc. Devices (CPAP MACHINE) MISC, by Does not apply route Please change auto CPAP pressure settings to a fixed CPAP pressure to 10 cm H20 from his current settings.  EPR of 2.  Please do a mask refit and issue a new mask of choice to minimize leaks and to improve compliance for >4hours of CPAP therapy., Disp: 1 each, Rfl: 0    tamsulosin (FLOMAX) 0.4 MG capsule, Take 1 capsule by mouth daily, Disp: 90 capsule, Rfl: 3    cetirizine (ZYRTEC) 10 MG tablet, Take 1 tablet by mouth daily, Disp: ,

## 2024-06-13 NOTE — PROGRESS NOTES
New patient here for check up chf abn EKG     Pt c/o chest discomfort, swelling in bilateral feet,

## 2024-06-27 ENCOUNTER — OFFICE VISIT (OUTPATIENT)
Dept: CARDIOLOGY CLINIC | Age: 69
End: 2024-06-27
Payer: MEDICAID

## 2024-06-27 VITALS
DIASTOLIC BLOOD PRESSURE: 78 MMHG | BODY MASS INDEX: 43.45 KG/M2 | OXYGEN SATURATION: 97 % | HEART RATE: 69 BPM | SYSTOLIC BLOOD PRESSURE: 110 MMHG | WEIGHT: 302.8 LBS

## 2024-06-27 DIAGNOSIS — I10 ESSENTIAL HYPERTENSION: ICD-10-CM

## 2024-06-27 DIAGNOSIS — G47.33 OSA (OBSTRUCTIVE SLEEP APNEA): ICD-10-CM

## 2024-06-27 DIAGNOSIS — I50.32 CHF NYHA CLASS II, CHRONIC, DIASTOLIC (HCC): Primary | ICD-10-CM

## 2024-06-27 PROCEDURE — 3074F SYST BP LT 130 MM HG: CPT | Performed by: NURSE PRACTITIONER

## 2024-06-27 PROCEDURE — 99214 OFFICE O/P EST MOD 30 MIN: CPT | Performed by: NURSE PRACTITIONER

## 2024-06-27 PROCEDURE — 3078F DIAST BP <80 MM HG: CPT | Performed by: NURSE PRACTITIONER

## 2024-06-27 PROCEDURE — 1123F ACP DISCUSS/DSCN MKR DOCD: CPT | Performed by: NURSE PRACTITIONER

## 2024-06-27 ASSESSMENT — ENCOUNTER SYMPTOMS
SHORTNESS OF BREATH: 1
ABDOMINAL DISTENTION: 0
COUGH: 0

## 2024-06-27 NOTE — PATIENT INSTRUCTIONS
You may receive a survey regarding the care you received during your visit.  Your input is valuable to us.  We encourage you to complete and return your survey.  We hope you will choose us in the future for your healthcare needs.    Your nurses today were Seymour.  Office hours:   Mon-Thurs 8-4:30  Friday 8-12  Phone: [phone]    Continue:  Continue current medications  Daily weights and record  Fluid restriction of 2 Liters per day  Limit sodium in diet to around 9428-5827 mg/day  Monitor BP  Activity as tolerated     Call the Heart Failure Clinic for any of the following symptoms:   Weight gain of 3 pounds in 1 day or 5 pounds in 1 week  Increased shortness of breath  Shortness of breath while laying down  Cough  Chest pain  Swelling in feet, ankles or legs  Bloating in abdomen  Fatigue

## 2024-06-27 NOTE — PROGRESS NOTES
Heart Failure Clinic       Visit Date: 6/27/2024  Cardiologist:    Primary Care Physician: Zarina Clark, APRN - NP (Inactive)    Lico Aquino is a 69 y.o. male who presents today for:  Chief Complaint   Patient presents with    Congestive Heart Failure       HPI:   Lico Aquino is a 69 y.o. male who presents to the office for a patient visit in the heart failure clinic.  Accompanied by no one  At [facility] (since 4/2023)    TYPE HF: Diastolic (55-60%)   Device: none  HX: HTN, HLD, DM, Anemia, BALTA (not wear), Parkinsons?, Obesity, Former smoker (marijuana and cigarettes) - ?MI in [place] (no intervention)    Dry Wt: 340#    Hospitalization:  April 2020 - CHF - SOB, diuresed 36#. Anemic - 2 U RBC.  Started on Lasix po at D/C  Needs sleep study    Not seen since 2020   TODAY 6/2024 - reestablish care - not sure who referred - appears PCP sent to Cardio/CHF both - saw Dr Oliveira few weeks ago, more CP symptoms - getting Stress and ECHO on 7/10.   302#  Breathing improving w/ losing wt - states he was 368# last year.   Swelling stable - improved w/ Lasix   Denies SOB, cough, orthopnea.  No fluid on exam today.  U/o good.   Seeing [facility] Neuro for ?Parkinsons.   Pretty immobile - motorized chair.        Past Medical History:   Diagnosis Date    Depression     Gout     Hyperlipidemia     Hypertension     Neuropathy     Pneumonia     Type II or unspecified type diabetes mellitus without mention of complication, not stated as uncontrolled      Past Surgical History:   Procedure Laterality Date    BRONCHOSCOPY N/A 3/8/2018    BRONCHOSCOPY ALVEOLAR LAVAGE performed by Radha Koehler MD at [facility]    TONSILLECTOMY       Family History   Problem Relation Age of Onset    Heart Attack Mother     Diabetes Mother     Lung Cancer Father     Cancer Maternal Uncle     Cancer Paternal Grandmother     Diabetes Paternal Grandmother     Parkinsonism Paternal Uncle      Social History     Tobacco Use    Smoking

## 2024-07-10 ENCOUNTER — HOSPITAL ENCOUNTER (OUTPATIENT)
Dept: NUCLEAR MEDICINE | Age: 69
Discharge: HOME | End: 2024-07-10
Attending: INTERNAL MEDICINE
Payer: MEDICAID

## 2024-07-10 ENCOUNTER — HOSPITAL ENCOUNTER (OUTPATIENT)
Age: 69
Discharge: HOME | End: 2024-07-12
Attending: INTERNAL MEDICINE
Payer: MEDICAID

## 2024-07-10 VITALS — WEIGHT: 300 LBS | BODY MASS INDEX: 42.95 KG/M2 | HEIGHT: 70 IN

## 2024-07-10 DIAGNOSIS — R06.02 SHORTNESS OF BREATH: ICD-10-CM

## 2024-07-10 DIAGNOSIS — R07.9 CHEST PAIN, UNSPECIFIED TYPE: ICD-10-CM

## 2024-07-10 DIAGNOSIS — I50.30 DIASTOLIC CONGESTIVE HEART FAILURE, UNSPECIFIED HF CHRONICITY (HCC): ICD-10-CM

## 2024-07-10 DIAGNOSIS — I20.9 ANGINA PECTORIS (HCC): ICD-10-CM

## 2024-07-10 LAB
ECHO AO ASC DIAM: 3.4 CM
ECHO AV CUSP MM: 2.4 CM
ECHO AV PEAK GRADIENT: 6 MMHG
ECHO AV PEAK VELOCITY: 1.2 M/S
ECHO AV VELOCITY RATIO: 0.83
ECHO BSA: 2.59 M2
ECHO EST RA PRESSURE: 5 MMHG
ECHO LA AREA 2C: 23.7 CM2
ECHO LA AREA 4C: 22.9 CM2
ECHO LA DIAMETER: 4.4 CM
ECHO LA MAJOR AXIS: 6 CM
ECHO LA MINOR AXIS: 6.1 CM
ECHO LA VOL BP: 75 ML (ref 18–58)
ECHO LA VOL MOD A2C: 77 ML (ref 18–58)
ECHO LA VOL MOD A4C: 71 ML (ref 18–58)
ECHO LV E' LATERAL VELOCITY: 8 CM/S
ECHO LV E' SEPTAL VELOCITY: 9 CM/S
ECHO LV EDV A2C: 115 ML
ECHO LV EDV A4C: 155 ML
ECHO LV EJECTION FRACTION A2C: 63 %
ECHO LV EJECTION FRACTION A4C: 67 %
ECHO LV EJECTION FRACTION BIPLANE: 65 % (ref 55–100)
ECHO LV ESV A2C: 42 ML
ECHO LV ESV A4C: 51 ML
ECHO LV FRACTIONAL SHORTENING: 30 % (ref 28–44)
ECHO LV INTERNAL DIMENSION DIASTOLIC: 4.6 CM (ref 4.2–5.9)
ECHO LV INTERNAL DIMENSION SYSTOLIC: 3.2 CM
ECHO LV ISOVOLUMETRIC RELAXATION TIME (IVRT): 56 MS
ECHO LV IVSD: 1.1 CM (ref 0.6–1)
ECHO LV MASS 2D: 181.2 G (ref 88–224)
ECHO LV POSTERIOR WALL DIASTOLIC: 1.1 CM (ref 0.6–1)
ECHO LV RELATIVE WALL THICKNESS RATIO: 0.48
ECHO LVOT PEAK GRADIENT: 4 MMHG
ECHO LVOT PEAK VELOCITY: 1 M/S
ECHO MV A VELOCITY: 0.82 M/S
ECHO MV E DECELERATION TIME (DT): 218 MS
ECHO MV E VELOCITY: 1.08 M/S
ECHO MV E/A RATIO: 1.32
ECHO MV E/E' LATERAL: 13.5
ECHO MV E/E' RATIO (AVERAGED): 12.75
ECHO MV E/E' SEPTAL: 12
ECHO MV REGURGITANT PEAK GRADIENT: 71 MMHG
ECHO MV REGURGITANT PEAK VELOCITY: 4.2 M/S
ECHO PV MAX VELOCITY: 0.9 M/S
ECHO PV PEAK GRADIENT: 3 MMHG
ECHO RIGHT VENTRICULAR SYSTOLIC PRESSURE (RVSP): 22 MMHG
ECHO RV INTERNAL DIMENSION: 3.3 CM
ECHO RV TAPSE: 3.1 CM (ref 1.7–?)
ECHO TV E WAVE: 0.6 M/S
ECHO TV REGURGITANT MAX VELOCITY: 2.05 M/S
ECHO TV REGURGITANT PEAK GRADIENT: 17 MMHG
NUC STRESS EJECTION FRACTION: 62 %
STRESS BASELINE DIAS BP: 67 MMHG
STRESS BASELINE HR: 73 BPM
STRESS BASELINE SYS BP: 139 MMHG
STRESS STAGE 1 BP: NORMAL MMHG
STRESS STAGE 1 DURATION: 1 MIN:SEC
STRESS STAGE 1 HR: 80 BPM
STRESS STAGE 2 BP: NORMAL MMHG
STRESS STAGE 2 DURATION: 1 MIN:SEC
STRESS STAGE 2 HR: 93 BPM
STRESS STAGE 3 BP: NORMAL MMHG
STRESS STAGE 3 DURATION: 1 MIN:SEC
STRESS STAGE 3 HR: 93 BPM
STRESS STAGE RECOVERY 1 BP: NORMAL MMHG
STRESS STAGE RECOVERY 1 DURATION: 1 MIN:SEC
STRESS STAGE RECOVERY 1 HR: 93 BPM
STRESS STAGE RECOVERY 2 BP: NORMAL MMHG
STRESS STAGE RECOVERY 2 DURATION: 1 MIN:SEC
STRESS STAGE RECOVERY 2 HR: 90 BPM
STRESS STAGE RECOVERY 3 BP: NORMAL MMHG
STRESS STAGE RECOVERY 3 DURATION: 1 MIN:SEC
STRESS STAGE RECOVERY 3 HR: 89 BPM
STRESS STAGE RECOVERY 4 BP: NORMAL MMHG
STRESS STAGE RECOVERY 4 DURATION: 1 MIN:SEC
STRESS STAGE RECOVERY 4 HR: 88 BPM
STRESS TARGET HR: 152 BPM
TID: 0.87

## 2024-07-10 PROCEDURE — 78452 HT MUSCLE IMAGE SPECT MULT: CPT | Performed by: INTERNAL MEDICINE

## 2024-07-10 PROCEDURE — C8929 TTE W OR WO FOL WCON,DOPPLER: HCPCS

## 2024-07-10 PROCEDURE — 93017 CV STRESS TEST TRACING ONLY: CPT

## 2024-07-10 PROCEDURE — 3430000000 HC RX DIAGNOSTIC RADIOPHARMACEUTICAL: Performed by: INTERNAL MEDICINE

## 2024-07-10 PROCEDURE — A9500 TC99M SESTAMIBI: HCPCS | Performed by: INTERNAL MEDICINE

## 2024-07-10 PROCEDURE — 78452 HT MUSCLE IMAGE SPECT MULT: CPT

## 2024-07-10 PROCEDURE — 6360000004 HC RX CONTRAST MEDICATION: Performed by: INTERNAL MEDICINE

## 2024-07-10 PROCEDURE — 6360000002 HC RX W HCPCS: Performed by: INTERNAL MEDICINE

## 2024-07-10 PROCEDURE — 2580000003 HC RX 258: Performed by: INTERNAL MEDICINE

## 2024-07-10 PROCEDURE — 93306 TTE W/DOPPLER COMPLETE: CPT | Performed by: INTERNAL MEDICINE

## 2024-07-10 PROCEDURE — 93016 CV STRESS TEST SUPVJ ONLY: CPT | Performed by: INTERNAL MEDICINE

## 2024-07-10 PROCEDURE — 93018 CV STRESS TEST I&R ONLY: CPT | Performed by: INTERNAL MEDICINE

## 2024-07-10 RX ORDER — REGADENOSON 0.08 MG/ML
0.4 INJECTION, SOLUTION INTRAVENOUS
Status: COMPLETED | OUTPATIENT
Start: 2024-07-10 | End: 2024-07-10

## 2024-07-10 RX ORDER — TETRAKIS(2-METHOXYISOBUTYLISOCYANIDE)COPPER(I) TETRAFLUOROBORATE 1 MG/ML
30.8 INJECTION, POWDER, LYOPHILIZED, FOR SOLUTION INTRAVENOUS
Status: COMPLETED | OUTPATIENT
Start: 2024-07-10 | End: 2024-07-10

## 2024-07-10 RX ORDER — TETRAKIS(2-METHOXYISOBUTYLISOCYANIDE)COPPER(I) TETRAFLUOROBORATE 1 MG/ML
9.6 INJECTION, POWDER, LYOPHILIZED, FOR SOLUTION INTRAVENOUS
Status: COMPLETED | OUTPATIENT
Start: 2024-07-10 | End: 2024-07-10

## 2024-07-10 RX ADMIN — Medication 30.8 MILLICURIE: at 09:43

## 2024-07-10 RX ADMIN — REGADENOSON 0.4 MG: 0.08 INJECTION, SOLUTION INTRAVENOUS at 09:43

## 2024-07-10 RX ADMIN — PERFLUTREN 10 ML: 6.52 INJECTION, SUSPENSION INTRAVENOUS at 08:38

## 2024-07-10 RX ADMIN — Medication 9.6 MILLICURIE: at 08:36

## 2024-07-15 LAB
BUN BLDV-MCNC: 23 MG/DL
CALCIUM SERPL-MCNC: 8.9 MG/DL
CHLORIDE BLD-SCNC: 108 MMOL/L
CO2: 25 MMOL/L
CREAT SERPL-MCNC: 1.2 MG/DL
EGFR: 60
GLUCOSE BLD-MCNC: 72 MG/DL
POTASSIUM SERPL-SCNC: 4.2 MMOL/L
SODIUM BLD-SCNC: 143 MMOL/L

## 2024-07-31 ENCOUNTER — OFFICE VISIT (OUTPATIENT)
Dept: PULMONOLOGY | Age: 69
End: 2024-07-31
Payer: MEDICAID

## 2024-07-31 VITALS
OXYGEN SATURATION: 98 % | TEMPERATURE: 98.3 F | SYSTOLIC BLOOD PRESSURE: 132 MMHG | HEART RATE: 52 BPM | WEIGHT: 300 LBS | HEIGHT: 70 IN | DIASTOLIC BLOOD PRESSURE: 60 MMHG | BODY MASS INDEX: 42.95 KG/M2

## 2024-07-31 DIAGNOSIS — G47.33 OSA ON CPAP: Primary | ICD-10-CM

## 2024-07-31 PROCEDURE — 3078F DIAST BP <80 MM HG: CPT | Performed by: INTERNAL MEDICINE

## 2024-07-31 PROCEDURE — 3075F SYST BP GE 130 - 139MM HG: CPT | Performed by: INTERNAL MEDICINE

## 2024-07-31 PROCEDURE — 99213 OFFICE O/P EST LOW 20 MIN: CPT | Performed by: INTERNAL MEDICINE

## 2024-07-31 PROCEDURE — 1123F ACP DISCUSS/DSCN MKR DOCD: CPT | Performed by: INTERNAL MEDICINE

## 2024-07-31 RX ORDER — DULAGLUTIDE 0.75 MG/.5ML
0.75 INJECTION, SOLUTION SUBCUTANEOUS WEEKLY
COMMUNITY
Start: 2024-07-26

## 2024-07-31 NOTE — PROGRESS NOTES
[place] for Pulmonary, Sleep and Critical Care Medicine  Sleep Medicine Clinic Follow up note    Lico Aquino                                             Chief complaint and [place]: Lico Aquino is a 69 y.o.oldmale came for follow up regarding his sleep apnea. He is currently using his positive airway pressure device with an Auto CPAP machine therapy with a Minimum CPAP of 6cm H20 and Maximum CPAP of 20cm H20.  At the last visit his machine settings were changed to fixed CPAP pressure 10 cm of water with an EPR of 2.  His DME company [employer] did not change his CPAP pressure settings to a fixed CPAP pressure of 10 cm of water with an EPR of 2.  He remained on treatment with auto CPAP with a minimum CPAP pressure of 6 and METS of water max CPAP pressure 20 cm of water.  Patient is still having a poor complaints to recommended CPAP therapy.  He is not getting any help from the nurses at nursing home  [facility] to clean his CPAP tubing or mask to use with his CPAP device.  He is worried that he is going get infection from the dirty tubing and mask.    He is not sleeping well at night.He denies any daytime sleepiness.    He is currently staying at [facility].  He is to be discharged home from his nursing home in near future whenever he started walking on his own.    He underwent home/portable sleep study on 05/22/2023.     Patient cannot have his in lab sleep study performed due to his limited mobility.  Patient can move around only with the help of rolling wheelchair and he also had an indwelling Anne's catheter.        Review of Systems:   General/Constitutional: he lost 74lbs of weight from the last visit. No fever or chills.  HENT: Negative.   Eyes: Negative.  Upper respiratory tract: No nasal stuffiness or post nasal drip.  Lower respiratory tract/ lungs: No cough or sputum production.  Cardiovascular: No palpitations or chest 
  Chief Complaint:   7mo BALTA f/u    Mallampati airway Class:IV  Neck Circumference:17.5 Inches    [place] sleepiness score 7/30/24: 1.  SAQLI:  92    Diagnostic Data:   PAP Download:   Recorded compliance dates:  More than 4hour usage compliance was:0%.  Average residual Apnea- Hypoapnea index on current pressue was:0.      PAP Type CPAP     Level  8 cmH2O     Average usuage hours per day was:3 minutes     Interface: FFM    Provider:  []TAM  []Nakia  []Maximilian  []Dirk         [][facility] [x]  Ignacio  
well.  -He was advised to submit his download report from his CPAP for next 1month starting from today to document his > 4hour compliance. He was informed about the possibility of loosing his CPAP if he don't use it with good compliance for >4hours i.e >70%. He verbalizes understanding.  -We will request recent nursing home staff to check on his a CPAP therapy to improve his compliance for more than 4 hours of CPAP therapy.  -He was referred to DME( [employer]) company for mask refit with a different style mask for better compliance and comfort.  -He was given a new mask to trial in an attempt to improve compliance  -He is aware of the consequences of poor compliance to his recommended positive air way pressure therapy including increased risk for cardiovascular and cerebrovascular events and morbidity including death.  -He was instructed to extend his sleep schedule to 7 to 9 hours in a given 24hour period continuously.  -He was educated and advised to apply his current mask properly and tight enough to minimize leaks.  -He was advised to continue current positive airway pressure therapy with above described pressure.  -He was advised to keep good compliance with current recommended pressure to get optimal results and clinical improvement.  -Follow with my clinic in 4months for clinical reevaluation with review of download.  -He was advised to call [employer] regarding supplies if needed.  -He was advised to loose weight by controlling diet and doing exercise  -He was advised to call my office for earlier appointment if needed for worsening of sleep symptoms.  -Lico Aquino was educated about my impression and plan. Patient verbalizes understanding.    Addendum by Dr. Poornima MD:  Patient seen by me independently including key components of medical care. Face to face evaluation and examination was performed. Case discussed with Dr. Emile Salmeron MD. Agree with resident's findings and

## 2024-11-18 ENCOUNTER — TELEPHONE (OUTPATIENT)
Dept: PULMONOLOGY | Age: 69
End: 2024-11-18

## 2024-11-18 NOTE — TELEPHONE ENCOUNTER
Patient nurse at [facility] called in checking to see what was the patient's pressure setting for his CPAP. I informed her from the last patient appointment it said to continue the pressure setting of 8. She mentioned that the patient was complaining about not feeling like he is getting enough pressure from his machine. The patient nurse couldn't see where on the patient machine where she could see his pressure setting to make sure it was on 8. Stated the patient received a new machine a couple weeks ago and I wasn't able to pull up the patient download. Looks like patient has an appointment 12/4/24 and is advised to bring machine for download. Please advise.

## 2024-11-19 NOTE — TELEPHONE ENCOUNTER
I called and spoke with the patient nurse Junie at [facility], She stated the  patient was set up on a new machine a couple weeks ago. So I asked what company do they go with to get the patient's machine and she had no clue so that I could call the company and get tagged in the patients machine, not able to obtain download right now. I advised the patient's nurse to send in the patient machine with power cord to the patient's upcoming appointment on 12/4.

## 2024-12-04 ENCOUNTER — OFFICE VISIT (OUTPATIENT)
Dept: PULMONOLOGY | Age: 69
End: 2024-12-04
Payer: MEDICAID

## 2024-12-04 VITALS
SYSTOLIC BLOOD PRESSURE: 124 MMHG | HEIGHT: 70 IN | DIASTOLIC BLOOD PRESSURE: 60 MMHG | BODY MASS INDEX: 43.05 KG/M2 | TEMPERATURE: 98.4 F | OXYGEN SATURATION: 94 % | HEART RATE: 73 BPM

## 2024-12-04 DIAGNOSIS — G47.33 OSA ON CPAP: Primary | ICD-10-CM

## 2024-12-04 PROCEDURE — 99214 OFFICE O/P EST MOD 30 MIN: CPT | Performed by: INTERNAL MEDICINE

## 2024-12-04 PROCEDURE — 3078F DIAST BP <80 MM HG: CPT | Performed by: INTERNAL MEDICINE

## 2024-12-04 PROCEDURE — 1123F ACP DISCUSS/DSCN MKR DOCD: CPT | Performed by: INTERNAL MEDICINE

## 2024-12-04 PROCEDURE — 3074F SYST BP LT 130 MM HG: CPT | Performed by: INTERNAL MEDICINE

## 2024-12-04 RX ORDER — AMOXICILLIN 250 MG
1 CAPSULE ORAL DAILY
COMMUNITY

## 2024-12-04 NOTE — PROGRESS NOTES
[facility]  Sleep Medicine Clinic Follow up note    Lico Aquino                                             Chief complaint and [place]: Lico Aquino is a 69 y.o.oldmale came for follow up regarding his sleep apnea. He is currently using his positive airway pressure device with a  fixed CPAP pressure 8 cm of water with an EPR of 2. Patient is still having a very poor compliance with recommended CPAP therapy.  He says he is not using his mask at night due to several worries. He says he is not getting any help from the nurses at nursing home,  i.e [facility], to clean his CPAP tubing or mask to use with his CPAP device.  He is worried that he is going get infection from the dirty tubing and mask. He is also worried that his mask will not fit well at night and he would like nursing staff at his SNF to check on him at night to make sure the mask is fitting well.    He is not sleeping well at night.He denies any daytime sleepiness.    He is currently staying at [facility].  He is to be discharged home from his nursing home in near future whenever he started walking on his own.    He underwent home/portable sleep study on 05/22/2023.     Patient cannot have his in lab sleep study performed due to his limited mobility.  Patient can move around only with the help of rolling wheelchair and he also had an indwelling Anne's catheter.        Review of Systems:   General/Constitutional: he lost 11lbs of weight from the last visit. No fever or chills.  HENT: Negative.   Eyes: Negative.  Upper respiratory tract: No nasal stuffiness or post nasal drip.  Lower respiratory tract/ lungs: No cough or sputum production.  Cardiovascular: No palpitations or chest pain.  Gastrointestinal: No nausea or vomiting.  Neurological: No focal neurologiacal weakness.  Extremities: No edema.  Musculoskeletal: No complaints.  Genitourinary: No 
  Chief Complaint:  5mo BALTA f/u w/Ignacio download    Mallampati airway Class:IV  Neck Circumference:17.5 Inches    Roslyn sleepiness score 12/3/24: 1.  SAQLI:  92    Diagnostic Data:   PAP Download:   Recorded compliance dates:  11/6/24-12/3/24  More than 4hour usage compliance was:57%.  Average residual Apnea- Hypoapnea index on current pressue was:1.4.      PAP Type CPAP    Level  8 cmH2O     Average usuage hours per day was:3.59     Interface: FFM    Provider:  []-RODNEY  []Nkaia  []Maximilian  []Dirk         [][facility] [x]Ignacio    
Patient also has questions about getting his twin brother checked for sleep apnea, as he notices that he breathes through his mouth frequently when sleeping.     Review of Systems:   General/Constitutional: he denies any loss of weight from the last visit. No fever or chills.  HENT: Negative.   Eyes: Negative.  Upper respiratory tract: No nasal stuffiness or post nasal drip.  Lower respiratory tract/ lungs: No cough or sputum production.  Cardiovascular: No palpitations or chest pain.  Gastrointestinal: No nausea or vomiting.  Neurological: No focal neurologiacal weakness.  Extremities: No edema.  Musculoskeletal: No complaints.  Genitourinary: No complaints.  Hematological: Negative.   Psychiatric/Behavioral: Negative.   Skin: No itching.      Past Medical History:   Diagnosis Date    Depression     Gout     Hyperlipidemia     Hypertension     Neuropathy     Pneumonia     Type II or unspecified type diabetes mellitus without mention of complication, not stated as uncontrolled        Past Surgical History:   Procedure Laterality Date    BRONCHOSCOPY N/A 3/8/2018    BRONCHOSCOPY ALVEOLAR LAVAGE performed by Radha Koehler MD at [facility]    TONSILLECTOMY         Social History     Tobacco Use    Smoking status: Former     Current packs/day: 0.00     Average packs/day: 1.5 packs/day for 15.0 years (22.5 ttl pk-yrs)     Types: Cigarettes     Start date: 1967     Quit date: 1982     Years since quittin.9    Smokeless tobacco: Never   Vaping Use    Vaping status: Never Used   Substance Use Topics    Alcohol use: Not Currently     Comment: one ddrink on new years and thats it    Drug use: Not Currently     Types: Marijuana (Weed)       Allergies   Allergen Reactions    Aripiprazole Other (See Comments)     Pain in lower back    Aripiprazole      Other reaction(s): Abilify, unable to straighten up when taking, unable to straighten up when taking    Citalopram Hydrobromide Other (See Comments)     Muscle

## 2025-04-09 ENCOUNTER — OFFICE VISIT (OUTPATIENT)
Age: 70
End: 2025-04-09
Payer: MEDICAID

## 2025-04-09 VITALS
HEIGHT: 70 IN | OXYGEN SATURATION: 96 % | DIASTOLIC BLOOD PRESSURE: 62 MMHG | WEIGHT: 314 LBS | BODY MASS INDEX: 44.95 KG/M2 | SYSTOLIC BLOOD PRESSURE: 128 MMHG | HEART RATE: 76 BPM | TEMPERATURE: 97.7 F

## 2025-04-09 DIAGNOSIS — G47.33 OSA ON CPAP: Primary | ICD-10-CM

## 2025-04-09 DIAGNOSIS — E66.01 OBESITY, MORBID, BMI 40.0-49.9: ICD-10-CM

## 2025-04-09 PROCEDURE — 99214 OFFICE O/P EST MOD 30 MIN: CPT

## 2025-04-09 PROCEDURE — 3078F DIAST BP <80 MM HG: CPT

## 2025-04-09 PROCEDURE — 3074F SYST BP LT 130 MM HG: CPT

## 2025-04-09 PROCEDURE — 1123F ACP DISCUSS/DSCN MKR DOCD: CPT

## 2025-04-09 RX ORDER — OMEPRAZOLE 40 MG/1
CAPSULE, DELAYED RELEASE ORAL
COMMUNITY
Start: 2025-01-29

## 2025-04-09 RX ORDER — INSULIN GLARGINE 100 [IU]/ML
INJECTION, SOLUTION SUBCUTANEOUS
COMMUNITY
Start: 2025-04-03

## 2025-04-09 RX ORDER — INSULIN LISPRO 100 [IU]/ML
INJECTION, SOLUTION INTRAVENOUS; SUBCUTANEOUS
COMMUNITY
Start: 2025-03-24

## 2025-04-09 RX ORDER — CARBIDOPA/LEVODOPA 25MG-250MG
TABLET ORAL
COMMUNITY
Start: 2025-03-28

## 2025-04-09 ASSESSMENT — ENCOUNTER SYMPTOMS
COUGH: 0
SORE THROAT: 0
SHORTNESS OF BREATH: 1
WHEEZING: 0
RHINORRHEA: 0

## 2025-04-09 NOTE — PROGRESS NOTES
Exam  Vitals and nursing note reviewed.   Constitutional:       General: He is not in acute distress.     Appearance: He is obese.   HENT:      Head: Normocephalic and atraumatic.      Right Ear: External ear normal.      Left Ear: External ear normal.      Mouth/Throat:      Mouth: Mucous membranes are moist.      Pharynx: No oropharyngeal exudate or posterior oropharyngeal erythema.   Eyes:      General:         Right eye: No discharge.         Left eye: No discharge.   Cardiovascular:      Rate and Rhythm: Normal rate and regular rhythm.      Heart sounds: Normal heart sounds.   Pulmonary:      Effort: Pulmonary effort is normal. No respiratory distress.      Breath sounds: Normal breath sounds. No wheezing, rhonchi or rales.   Musculoskeletal:      Cervical back: Neck supple.      Right lower leg: No edema.      Left lower leg: No edema.   Skin:     General: Skin is warm and dry.   Neurological:      General: No focal deficit present.      Mental Status: He is alert.   Psychiatric:         Mood and Affect: Mood normal.         Behavior: Behavior normal.         Thought Content: Thought content normal.            Information added by my medical assistant/LPN was reviewed today.    Electronically signed by REI Manning CNP on 4/9/2025 at 2:54 PM

## 2025-06-23 NOTE — PROGRESS NOTES
[facility] [place] [facility]  [facility] CARDIOLOGY  [address]  [address]  [facility] [address]  Dept: [phone]  Dept Fax: [phone]  Loc: [phone]    Visit Date: 6/24/2025  Mr. Aquino is a 69 y.o. male who presented for:  CHF  HPI:   Lico Aquino is a pleasant 69 y.o. male who  has a past medical history of Depression, Gout, Hyperlipidemia, Hypertension, Neuropathy, Pneumonia, and Type II or unspecified type diabetes mellitus without mention of complication, not stated as uncontrolled. Has h/o HFpEF, abnormal EKG. Echocardiogram on 7/2024 revealed a preserved EF. Stress test 7/2024 was negative for ischemia. Patient denies chest pain, shortness of breath, dyspnea on exertion. No palpitations, dizziness. He is wheelchair bound, states that he has severe neuropathy. He lives at NH.       Current Outpatient Medications:     carbidopa-levodopa (SINEMET)  MG per tablet, , Disp: , Rfl:     LANTUS SOLOSTAR 100 UNIT/ML injection pen, , Disp: , Rfl:     insulin lispro, 1 Unit Dial, (HUMALOG/ADMELOG) 100 UNIT/ML SOPN, , Disp: , Rfl:     omeprazole (PRILOSEC) 40 MG delayed release capsule, , Disp: , Rfl:     senna-docusate (PERICOLACE) 8.6-50 MG per tablet, Take 1 tablet by mouth daily, Disp: , Rfl:     TRULICITY 0.75 MG/0.5ML SOPN SC injection, Inject 0.5 mLs into the skin once a week, Disp: , Rfl:     acetaminophen (TYLENOL) 500 MG tablet, Take 2 tablets by mouth every 6 hours as needed for Pain, Disp: , Rfl:     LIDOCAINE EX, Apply topically Lidocaine patch, Disp: , Rfl:     Menthol, Topical Analgesic, (BIOFREEZE) 4 % GEL, Apply topically, Disp: , Rfl:     apixaban (ELIQUIS) 5 MG TABS tablet, Take by mouth 2 times daily, Disp: , Rfl:     VICTOZA 18 MG/3ML SOPN SC injection, , Disp: , Rfl:     furosemide (LASIX) 20 MG tablet, Take 1 tablet by mouth every 48 hours, Disp: 60 tablet, Rfl: 3    Misc. Devices (CPAP MACHINE) MISC, by Does not apply route Please change his

## 2025-06-23 NOTE — PATIENT INSTRUCTIONS
You may receive a survey regarding the care you received during your visit. We encourage you to complete and return your survey, as your input is valuable to us. We hope you will choose us in the future for your healthcare needs. Thank you!    Your Medical Assistant today: MICHAEL Granda & MILLA Cornejo  Thank you for coming to our office! It was a pleasure to serve you.

## 2025-06-24 ENCOUNTER — OFFICE VISIT (OUTPATIENT)
Dept: CARDIOLOGY CLINIC | Age: 70
End: 2025-06-24
Payer: MEDICAID

## 2025-06-24 VITALS
HEIGHT: 70 IN | BODY MASS INDEX: 44.95 KG/M2 | SYSTOLIC BLOOD PRESSURE: 114 MMHG | DIASTOLIC BLOOD PRESSURE: 64 MMHG | HEART RATE: 71 BPM | WEIGHT: 314 LBS

## 2025-06-24 DIAGNOSIS — R07.9 CHEST PAIN, UNSPECIFIED TYPE: Primary | ICD-10-CM

## 2025-06-24 DIAGNOSIS — R06.02 SHORTNESS OF BREATH: ICD-10-CM

## 2025-06-24 PROCEDURE — 99214 OFFICE O/P EST MOD 30 MIN: CPT | Performed by: INTERNAL MEDICINE

## 2025-06-24 PROCEDURE — 1123F ACP DISCUSS/DSCN MKR DOCD: CPT | Performed by: INTERNAL MEDICINE

## 2025-06-24 PROCEDURE — 3074F SYST BP LT 130 MM HG: CPT | Performed by: INTERNAL MEDICINE

## 2025-06-24 PROCEDURE — 3078F DIAST BP <80 MM HG: CPT | Performed by: INTERNAL MEDICINE

## 2025-06-24 PROCEDURE — 93000 ELECTROCARDIOGRAM COMPLETE: CPT | Performed by: INTERNAL MEDICINE

## 2025-06-24 RX ORDER — LORATADINE 10 MG/1
10 TABLET ORAL DAILY
COMMUNITY

## 2025-06-24 RX ORDER — GUAIFENESIN 600 MG/1
1200 TABLET, EXTENDED RELEASE ORAL 2 TIMES DAILY
COMMUNITY